# Patient Record
Sex: MALE | Race: WHITE | NOT HISPANIC OR LATINO | Employment: OTHER | URBAN - METROPOLITAN AREA
[De-identification: names, ages, dates, MRNs, and addresses within clinical notes are randomized per-mention and may not be internally consistent; named-entity substitution may affect disease eponyms.]

---

## 2022-06-23 ENCOUNTER — HOSPITAL ENCOUNTER (OUTPATIENT)
Dept: RADIOLOGY | Facility: IMAGING CENTER | Age: 82
Discharge: HOME | End: 2022-06-23
Payer: MEDICARE

## 2022-06-23 DIAGNOSIS — R91.1 PULMONARY NODULE: ICD-10-CM

## 2022-06-23 PROCEDURE — 71250 CT THORAX DX C-: CPT

## 2022-09-09 ENCOUNTER — HOSPITAL ENCOUNTER (OUTPATIENT)
Dept: RADIOLOGY | Age: 82
Discharge: HOME | End: 2022-09-09
Payer: MEDICARE

## 2022-09-09 DIAGNOSIS — R91.8 PULMONARY NODULES: ICD-10-CM

## 2022-09-09 PROCEDURE — 71250 CT THORAX DX C-: CPT

## 2022-09-26 ENCOUNTER — HOSPITAL ENCOUNTER (OUTPATIENT)
Dept: NON INVASIVE DIAGNOSTICS | Facility: CLINIC | Age: 82
Discharge: HOME | End: 2022-09-26
Payer: MEDICARE

## 2022-09-26 DIAGNOSIS — R07.9 CHEST PAIN, UNSPECIFIED TYPE: ICD-10-CM

## 2022-09-26 LAB
BASELINE ST DEPRESSION: 0 MM
MAX HR: 82 BPM
NUC STRESS EJECTION FRACTION: 70 %
RATE PRESSURE PRODUCT: NORMAL
SL CV REST NUCLEAR ISOTOPE DOSE: 15.77 MCI
SL CV STRESS NUCLEAR ISOTOPE DOSE: 48 MCI
SL CV STRESS RECOVERY BP: NORMAL MMHG
SL CV STRESS RECOVERY HR: 69 BPM
SL CV STRESS RECOVERY O2 SAT: 98 %
STRESS ANGINA INDEX: 0
STRESS BASELINE BP: NORMAL MMHG
STRESS BASELINE HR: 72 BPM
STRESS O2 SAT REST: 98 %
STRESS PEAK HR: 82 BPM
STRESS POST O2 SAT PEAK: 99 %
STRESS POST PEAK BP: 142 MMHG
STRESS ST DEPRESSION: 0 MM
STRESS/REST PERFUSION RATIO: 1.04

## 2022-09-26 PROCEDURE — 78452 HT MUSCLE IMAGE SPECT MULT: CPT | Performed by: INTERNAL MEDICINE

## 2022-09-26 PROCEDURE — 93017 CV STRESS TEST TRACING ONLY: CPT

## 2022-09-26 PROCEDURE — 78452 HT MUSCLE IMAGE SPECT MULT: CPT

## 2022-09-26 PROCEDURE — 93018 CV STRESS TEST I&R ONLY: CPT | Performed by: INTERNAL MEDICINE

## 2022-09-26 PROCEDURE — A9502 TC99M TETROFOSMIN: HCPCS

## 2022-09-26 PROCEDURE — 93016 CV STRESS TEST SUPVJ ONLY: CPT | Performed by: INTERNAL MEDICINE

## 2022-09-26 RX ADMIN — REGADENOSON 0.4 MG: 0.08 INJECTION, SOLUTION INTRAVENOUS at 12:27

## 2022-09-27 LAB
CHEST PAIN STATEMENT: NORMAL
MAX DIASTOLIC BP: 84 MMHG
MAX HEART RATE: 82 BPM
MAX PREDICTED HEART RATE: 140 BPM
MAX. SYSTOLIC BP: 142 MMHG
PROTOCOL NAME: NORMAL
REASON FOR TERMINATION: NORMAL
TARGET HR FORMULA: NORMAL
TEST INDICATION: NORMAL
TIME IN EXERCISE PHASE: NORMAL

## 2022-10-27 ENCOUNTER — HOSPITAL ENCOUNTER (INPATIENT)
Facility: HOSPITAL | Age: 82
LOS: 2 days | Discharge: HOME HEALTH | End: 2022-10-30
Attending: EMERGENCY MEDICINE | Admitting: STUDENT IN AN ORGANIZED HEALTH CARE EDUCATION/TRAINING PROGRAM

## 2022-10-27 ENCOUNTER — APPOINTMENT (EMERGENCY)
Dept: RADIOLOGY | Facility: HOSPITAL | Age: 82
End: 2022-10-27

## 2022-10-27 DIAGNOSIS — R55 NEAR SYNCOPE: ICD-10-CM

## 2022-10-27 DIAGNOSIS — N17.9 AKI (ACUTE KIDNEY INJURY) (HCC): ICD-10-CM

## 2022-10-27 DIAGNOSIS — N18.9 ACUTE RENAL FAILURE SUPERIMPOSED ON CHRONIC KIDNEY DISEASE, UNSPECIFIED CKD STAGE, UNSPECIFIED ACUTE RENAL FAILURE TYPE (HCC): ICD-10-CM

## 2022-10-27 DIAGNOSIS — E87.5 HYPERKALEMIA: ICD-10-CM

## 2022-10-27 DIAGNOSIS — N40.0 BENIGN PROSTATIC HYPERPLASIA WITHOUT LOWER URINARY TRACT SYMPTOMS: ICD-10-CM

## 2022-10-27 DIAGNOSIS — R07.89 ATYPICAL CHEST PAIN: Primary | ICD-10-CM

## 2022-10-27 DIAGNOSIS — I48.0 PAROXYSMAL ATRIAL FIBRILLATION (HCC): ICD-10-CM

## 2022-10-27 DIAGNOSIS — N17.9 ACUTE RENAL FAILURE SUPERIMPOSED ON CHRONIC KIDNEY DISEASE, UNSPECIFIED CKD STAGE, UNSPECIFIED ACUTE RENAL FAILURE TYPE (HCC): ICD-10-CM

## 2022-10-27 PROBLEM — E11.9 TYPE 2 DIABETES MELLITUS (HCC): Status: ACTIVE | Noted: 2022-10-27

## 2022-10-27 PROBLEM — I49.5 SICK SINUS SYNDROME (HCC): Status: ACTIVE | Noted: 2022-10-27

## 2022-10-27 PROBLEM — R07.9 CHEST PAIN: Status: ACTIVE | Noted: 2022-10-27

## 2022-10-27 PROBLEM — R19.7 DIARRHEA: Status: ACTIVE | Noted: 2022-10-27

## 2022-10-27 PROBLEM — R13.10 DYSPHAGIA: Status: ACTIVE | Noted: 2022-10-27

## 2022-10-27 PROBLEM — R42 LIGHTHEADEDNESS: Status: ACTIVE | Noted: 2022-10-27

## 2022-10-27 PROBLEM — U07.1 COVID-19: Status: ACTIVE | Noted: 2022-10-27

## 2022-10-27 PROBLEM — E66.9 OBESITY: Status: ACTIVE | Noted: 2022-10-27

## 2022-10-27 LAB
2HR DELTA HS TROPONIN: 1 NG/L
4HR DELTA HS TROPONIN: 0 NG/L
ALBUMIN SERPL BCP-MCNC: 3.2 G/DL (ref 3.5–5)
ALP SERPL-CCNC: 53 U/L (ref 46–116)
ALT SERPL W P-5'-P-CCNC: 36 U/L (ref 12–78)
ANION GAP SERPL CALCULATED.3IONS-SCNC: 6 MMOL/L (ref 4–13)
AST SERPL W P-5'-P-CCNC: 20 U/L (ref 5–45)
ATRIAL RATE: 202 BPM
ATRIAL RATE: 394 BPM
ATRIAL RATE: 500 BPM
BASOPHILS # BLD AUTO: 0.03 THOUSANDS/ÂΜL (ref 0–0.1)
BASOPHILS NFR BLD AUTO: 0 % (ref 0–1)
BILIRUB SERPL-MCNC: 0.54 MG/DL (ref 0.2–1)
BUN SERPL-MCNC: 46 MG/DL (ref 5–25)
CALCIUM ALBUM COR SERPL-MCNC: 10 MG/DL (ref 8.3–10.1)
CALCIUM SERPL-MCNC: 9.4 MG/DL (ref 8.3–10.1)
CARDIAC TROPONIN I PNL SERPL HS: 10 NG/L
CARDIAC TROPONIN I PNL SERPL HS: 10 NG/L
CARDIAC TROPONIN I PNL SERPL HS: 11 NG/L
CHLORIDE SERPL-SCNC: 111 MMOL/L (ref 96–108)
CO2 SERPL-SCNC: 18 MMOL/L (ref 21–32)
CREAT SERPL-MCNC: 2.07 MG/DL (ref 0.6–1.3)
EOSINOPHIL # BLD AUTO: 0.13 THOUSAND/ÂΜL (ref 0–0.61)
EOSINOPHIL NFR BLD AUTO: 2 % (ref 0–6)
ERYTHROCYTE [DISTWIDTH] IN BLOOD BY AUTOMATED COUNT: 13.6 % (ref 11.6–15.1)
FLUAV RNA RESP QL NAA+PROBE: NEGATIVE
FLUBV RNA RESP QL NAA+PROBE: NEGATIVE
GFR SERPL CREATININE-BSD FRML MDRD: 29 ML/MIN/1.73SQ M
GLUCOSE SERPL-MCNC: 157 MG/DL (ref 65–140)
GLUCOSE SERPL-MCNC: 66 MG/DL (ref 65–140)
GLUCOSE SERPL-MCNC: 73 MG/DL (ref 65–140)
HCT VFR BLD AUTO: 37.7 % (ref 36.5–49.3)
HGB BLD-MCNC: 12.2 G/DL (ref 12–17)
IMM GRANULOCYTES # BLD AUTO: 0.03 THOUSAND/UL (ref 0–0.2)
IMM GRANULOCYTES NFR BLD AUTO: 0 % (ref 0–2)
LYMPHOCYTES # BLD AUTO: 1.28 THOUSANDS/ÂΜL (ref 0.6–4.47)
LYMPHOCYTES NFR BLD AUTO: 19 % (ref 14–44)
MCH RBC QN AUTO: 29.4 PG (ref 26.8–34.3)
MCHC RBC AUTO-ENTMCNC: 32.4 G/DL (ref 31.4–37.4)
MCV RBC AUTO: 91 FL (ref 82–98)
MONOCYTES # BLD AUTO: 0.55 THOUSAND/ÂΜL (ref 0.17–1.22)
MONOCYTES NFR BLD AUTO: 8 % (ref 4–12)
NEUTROPHILS # BLD AUTO: 4.84 THOUSANDS/ÂΜL (ref 1.85–7.62)
NEUTS SEG NFR BLD AUTO: 71 % (ref 43–75)
NRBC BLD AUTO-RTO: 0 /100 WBCS
NT-PROBNP SERPL-MCNC: 181 PG/ML
PLATELET # BLD AUTO: 190 THOUSANDS/UL (ref 149–390)
PMV BLD AUTO: 11.2 FL (ref 8.9–12.7)
POTASSIUM SERPL-SCNC: 5.8 MMOL/L (ref 3.5–5.3)
PR INTERVAL: 384 MS
PROT SERPL-MCNC: 6.6 G/DL (ref 6.4–8.4)
QRS AXIS: 0 DEGREES
QRS AXIS: 55 DEGREES
QRS AXIS: 56 DEGREES
QRS AXIS: 71 DEGREES
QRSD INTERVAL: 68 MS
QRSD INTERVAL: 78 MS
QRSD INTERVAL: 88 MS
QRSD INTERVAL: 88 MS
QT INTERVAL: 216 MS
QT INTERVAL: 352 MS
QT INTERVAL: 358 MS
QT INTERVAL: 424 MS
QTC INTERVAL: 246 MS
QTC INTERVAL: 360 MS
QTC INTERVAL: 385 MS
QTC INTERVAL: 409 MS
RBC # BLD AUTO: 4.15 MILLION/UL (ref 3.88–5.62)
RSV RNA RESP QL NAA+PROBE: NEGATIVE
SARS-COV-2 RNA RESP QL NAA+PROBE: POSITIVE
SODIUM SERPL-SCNC: 135 MMOL/L (ref 135–147)
T WAVE AXIS: 270 DEGREES
T WAVE AXIS: 62 DEGREES
T WAVE AXIS: 65 DEGREES
T WAVE AXIS: 75 DEGREES
VENTRICULAR RATE: 56 BPM
VENTRICULAR RATE: 61 BPM
VENTRICULAR RATE: 72 BPM
VENTRICULAR RATE: 78 BPM
WBC # BLD AUTO: 6.86 THOUSAND/UL (ref 4.31–10.16)

## 2022-10-27 RX ORDER — INSULIN LISPRO 100 [IU]/ML
2-12 INJECTION, SOLUTION INTRAVENOUS; SUBCUTANEOUS
Status: DISCONTINUED | OUTPATIENT
Start: 2022-10-28 | End: 2022-10-30 | Stop reason: HOSPADM

## 2022-10-27 RX ORDER — COLCHICINE 0.6 MG/1
0.6 TABLET ORAL 2 TIMES DAILY
Status: DISCONTINUED | OUTPATIENT
Start: 2022-10-27 | End: 2022-10-30 | Stop reason: HOSPADM

## 2022-10-27 RX ORDER — HEPARIN SODIUM 5000 [USP'U]/ML
5000 INJECTION, SOLUTION INTRAVENOUS; SUBCUTANEOUS EVERY 8 HOURS SCHEDULED
Status: DISCONTINUED | OUTPATIENT
Start: 2022-10-27 | End: 2022-10-29

## 2022-10-27 RX ORDER — SODIUM CHLORIDE, SODIUM GLUCONATE, SODIUM ACETATE, POTASSIUM CHLORIDE, MAGNESIUM CHLORIDE, SODIUM PHOSPHATE, DIBASIC, AND POTASSIUM PHOSPHATE .53; .5; .37; .037; .03; .012; .00082 G/100ML; G/100ML; G/100ML; G/100ML; G/100ML; G/100ML; G/100ML
75 INJECTION, SOLUTION INTRAVENOUS CONTINUOUS
Status: DISCONTINUED | OUTPATIENT
Start: 2022-10-27 | End: 2022-10-27

## 2022-10-27 RX ORDER — LEVOTHYROXINE SODIUM 0.1 MG/1
100 TABLET ORAL
Status: DISCONTINUED | OUTPATIENT
Start: 2022-10-28 | End: 2022-10-30 | Stop reason: HOSPADM

## 2022-10-27 RX ORDER — INSULIN GLARGINE 100 [IU]/ML
20 INJECTION, SOLUTION SUBCUTANEOUS
Status: DISCONTINUED | OUTPATIENT
Start: 2022-10-27 | End: 2022-10-27

## 2022-10-27 RX ORDER — METOPROLOL SUCCINATE 50 MG/1
50 TABLET, EXTENDED RELEASE ORAL DAILY
Status: DISCONTINUED | OUTPATIENT
Start: 2022-10-28 | End: 2022-10-30 | Stop reason: HOSPADM

## 2022-10-27 RX ORDER — ALLOPURINOL 100 MG/1
100 TABLET ORAL DAILY
Status: DISCONTINUED | OUTPATIENT
Start: 2022-10-28 | End: 2022-10-30 | Stop reason: HOSPADM

## 2022-10-27 RX ORDER — ALBUTEROL SULFATE 90 UG/1
2 AEROSOL, METERED RESPIRATORY (INHALATION) EVERY 6 HOURS PRN
Status: DISCONTINUED | OUTPATIENT
Start: 2022-10-27 | End: 2022-10-30 | Stop reason: HOSPADM

## 2022-10-27 RX ORDER — ACETAMINOPHEN 500 MG
500 TABLET ORAL EVERY 6 HOURS PRN
COMMUNITY

## 2022-10-27 RX ORDER — EZETIMIBE 10 MG/1
10 TABLET ORAL DAILY
COMMUNITY
Start: 2022-06-02 | End: 2023-06-02

## 2022-10-27 RX ORDER — LORATADINE 10 MG/1
10 TABLET ORAL DAILY
COMMUNITY

## 2022-10-27 RX ORDER — ALBUTEROL SULFATE 90 UG/1
2 AEROSOL, METERED RESPIRATORY (INHALATION) EVERY 6 HOURS PRN
COMMUNITY

## 2022-10-27 RX ORDER — SODIUM CHLORIDE, SODIUM GLUCONATE, SODIUM ACETATE, POTASSIUM CHLORIDE, MAGNESIUM CHLORIDE, SODIUM PHOSPHATE, DIBASIC, AND POTASSIUM PHOSPHATE .53; .5; .37; .037; .03; .012; .00082 G/100ML; G/100ML; G/100ML; G/100ML; G/100ML; G/100ML; G/100ML
75 INJECTION, SOLUTION INTRAVENOUS CONTINUOUS
Status: DISCONTINUED | OUTPATIENT
Start: 2022-10-27 | End: 2022-10-28

## 2022-10-27 RX ORDER — ALLOPURINOL 100 MG/1
100 TABLET ORAL DAILY
COMMUNITY

## 2022-10-27 RX ORDER — DEXTROSE MONOHYDRATE 25 G/50ML
25 INJECTION, SOLUTION INTRAVENOUS ONCE
Status: COMPLETED | OUTPATIENT
Start: 2022-10-27 | End: 2022-10-27

## 2022-10-27 RX ORDER — COLCHICINE 0.6 MG/1
0.6 TABLET ORAL 2 TIMES DAILY
COMMUNITY

## 2022-10-27 RX ORDER — FUROSEMIDE 20 MG/1
20 TABLET ORAL DAILY
COMMUNITY

## 2022-10-27 RX ORDER — LEVOTHYROXINE SODIUM 112 UG/1
TABLET ORAL
COMMUNITY
Start: 2022-09-19

## 2022-10-27 RX ORDER — LISINOPRIL 5 MG/1
TABLET ORAL
COMMUNITY
Start: 2022-09-20

## 2022-10-27 RX ORDER — EZETIMIBE 10 MG/1
10 TABLET ORAL DAILY
Status: DISCONTINUED | OUTPATIENT
Start: 2022-10-28 | End: 2022-10-30 | Stop reason: HOSPADM

## 2022-10-27 RX ORDER — TAMSULOSIN HYDROCHLORIDE 0.4 MG/1
0.4 CAPSULE ORAL
Status: DISCONTINUED | OUTPATIENT
Start: 2022-10-27 | End: 2022-10-30 | Stop reason: HOSPADM

## 2022-10-27 RX ORDER — LORATADINE 10 MG/1
10 TABLET ORAL DAILY
Status: DISCONTINUED | OUTPATIENT
Start: 2022-10-28 | End: 2022-10-30 | Stop reason: HOSPADM

## 2022-10-27 RX ADMIN — HEPARIN SODIUM 5000 UNITS: 5000 INJECTION INTRAVENOUS; SUBCUTANEOUS at 21:42

## 2022-10-27 RX ADMIN — COLCHICINE 0.6 MG: 0.6 TABLET ORAL at 19:45

## 2022-10-27 RX ADMIN — IOHEXOL 100 ML: 350 INJECTION, SOLUTION INTRAVENOUS at 15:27

## 2022-10-27 RX ADMIN — DEXTROSE MONOHYDRATE 25 ML: 25 INJECTION, SOLUTION INTRAVENOUS at 19:44

## 2022-10-27 RX ADMIN — TAMSULOSIN HYDROCHLORIDE 0.4 MG: 0.4 CAPSULE ORAL at 19:45

## 2022-10-27 RX ADMIN — SODIUM CHLORIDE, SODIUM GLUCONATE, SODIUM ACETATE, POTASSIUM CHLORIDE, MAGNESIUM CHLORIDE, SODIUM PHOSPHATE, DIBASIC, AND POTASSIUM PHOSPHATE 75 ML/HR: .53; .5; .37; .037; .03; .012; .00082 INJECTION, SOLUTION INTRAVENOUS at 19:44

## 2022-10-27 RX ADMIN — INSULIN HUMAN 10 UNITS: 100 INJECTION, SOLUTION PARENTERAL at 19:50

## 2022-10-27 NOTE — ASSESSMENT & PLAN NOTE
· On metoprolol succinate 50 mg daily  · On Pradaxa which has been on hold since yesterday for tooth extraction that's scheduled for tomorrow  · Continue to hold Pradaxa, patient discussed it with his cardiologist  · Dental appointment is tomorrow at 11:30 a m    · If patient remains inpatient tomorrow, restart Pradaxa

## 2022-10-27 NOTE — ASSESSMENT & PLAN NOTE
· Patient developed midsternal chest pain after taking a shower today that radiates to his back, neck and left arm  Associated left arm numbness  Improved with nitroglycerin  · ACS versus musculoskeletal versus aortic dissection  · He is following [facility], he was evaluated in September for midsternal chest pain  09/26/2022 he underwent pharmacological stress test which was negative  · Troponin x2 negative, EKG V paced, no acute ischemic changes    MARY score 3  · CTA chest abdomen pelvis showed no evidence of aortic aneurysm, dissection or intramural hematoma  · Pain is not reproducible  · Recent COVID infection, no pericardial effusion seen on CTA  · Continue to cycle troponin  · Telemetry  · Consider cardiology consult if pain reoccurs

## 2022-10-27 NOTE — ASSESSMENT & PLAN NOTE
· Patient tested positive for COVID about 3 weeks ago, at that time he had sore throat, denies having any respiratory issues   He received treatment for COVID as outpatient  · He tested positive for COVID today  · No respiratory issues  · CT chest without pneumonia  · Supportive care

## 2022-10-27 NOTE — ASSESSMENT & PLAN NOTE
· History of paroxysmal atrial fibrillation and sick sinus syndrome status post pacemaker  · Interrogate pacemaker

## 2022-10-27 NOTE — ASSESSMENT & PLAN NOTE
· Per wife and patient diarrhea almost since COVID, 3 weeks ago  · Usually it's once a day but it can happen up to 3 times a day  · Suspect COVID related  · Check C diff and enteric panel for completeness

## 2022-10-27 NOTE — H&P
1995 [facility] 1940, [de-identified] y o  male MRN: [id number]  Unit/Bed#: ED 14 Encounter: [id number]  Primary Care Provider: Kris Cary MD   Date and time admitted to hospital: 10/27/2022 11:45 AM    Due to low blood sugar discontinue Lantus HS  * Chest pain  Assessment & Plan  · Patient developed midsternal chest pain after taking a shower today that radiates to his back, neck and left arm  Associated left arm numbness  Improved with nitroglycerin  · ACS versus musculoskeletal versus aortic dissection  · He is following [facility], he was evaluated in September for midsternal chest pain  09/26/2022 he underwent pharmacological stress test which was negative  · Troponin x2 negative, EKG V paced, no acute ischemic changes    MARY score 3  · CTA chest abdomen pelvis showed no evidence of aortic aneurysm, dissection or intramural hematoma  · Pain is not reproducible  · Recent COVID infection, no pericardial effusion seen on CTA  · Continue to cycle troponin  · Telemetry  · Consider cardiology consult if pain reoccurs    Lightheadedness  Assessment & Plan  · Lightheadedness for over a week, suspect orthostatic since it happens after he sits up and stands up  · Reports poor p o  intake, which could be related to recent COVID  · Reports watery diarrhea usually once a day, but it can happen up to 3 times a day for the past 3 weeks which could also be related to COVID versus gastroenteritis, no episode of diarrhea today  · Check C diff and enteric panel in case of recurrence of diarrhea  · Interrogate pacemaker for completeness  · Gentle hydration with Plasma-Lyte 75 cc/hour  · Orthostatic vitals    Diarrhea  Assessment & Plan  · Per wife and patient diarrhea almost since COVID, 3 weeks ago  · Usually it's once a day but it can happen up to 3 times a day  · Suspect COVID related  · Check C diff and enteric panel for completeness    Acute renal failure superimposed on chronic kidney disease [facility])  Assessment & Plan  Lab Results   Component Value Date    EGFR 29 10/27/2022    CREATININE 2 07 (H) 10/27/2022   · Suspect secondary to poor p o  intake, diarrhea  · Baseline creatinine from 1 1-1 4  · Creatinine on admission 2 07  · Monitor creatinine in the next 24-48 hours, patient received contrast for CTA  · Bladder scan  · Patient is on Lasix 20 mg daily and lisinopril 10 mg daily, hold  · Gentle hydration with Plasma-Lyte at 75 cc/hour   · Repeat BMP tomorrow    Dysphagia  Assessment & Plan  · Patient has a feeling food gets stuck in his throat for the past week  · Contributes it to the pills that he got for COVID  · Speech eval, possible barium swallow eval if necessary    COVID-19  Assessment & Plan  · Patient tested positive for COVID about 3 weeks ago, at that time he had sore throat, denies having any respiratory issues  He received treatment for COVID as outpatient  · He tested positive for COVID today  · No respiratory issues  · CT chest without pneumonia  · Supportive care    [facility]  Assessment & Plan  Lab Results   Component Value Date    HGBA1C 9 5 (H) 05/11/2022       No results for input(s): POCGLU in the last 72 hours      Blood Sugar Average: Last 72 hrs:  ·  patient is on Tresiba 50 units in the morning and 80 units in the evening  · He did not take this morning dose of Tresiba because his blood sugar was in the 50s  · Will start Lantus 20 units HS and insulin sliding scale  · Diabetic diet    Hyperkalemia  Assessment & Plan  · Suspect in the setting of DANY on CKD stage 3  · Insulin/dextrose    Obesity  Assessment & Plan  · Lifestyle modification    BPH (benign prostatic hyperplasia)  Assessment & Plan  · Continue tamsulosin    Paroxysmal atrial fibrillation (HCC)  Assessment & Plan  · On metoprolol succinate 50 mg daily  · On Pradaxa which has been on hold since yesterday for tooth extraction that's scheduled for tomorrow  · Continue to hold Pradaxa, patient discussed it with his cardiologist  · Dental appointment is tomorrow at 11:30 a m  · If patient remains inpatient tomorrow, restart Pradaxa    Sick sinus syndrome (HCC)  Assessment & Plan  · History of paroxysmal atrial fibrillation and sick sinus syndrome status post pacemaker  · Interrogate pacemaker    VTE Pharmacologic Prophylaxis: VTE Score: 5 High Risk (Score >/= 5) - Pharmacological DVT Prophylaxis Ordered: heparin  Sequential Compression Devices Ordered  Code Status: Level 1 - Full Code   Discussion with family: Updated  (wife) via phone  Anticipated Length of Stay: Patient will be admitted on an observation basis with an anticipated length of stay of less than 2 midnights secondary to Chest pain, lightheadedness  Total Time for Visit, including Counseling / Coordination of Care: 60 minutes Greater than 50% of this total time spent on direct patient counseling and coordination of care  Chief Complaint:  Chest pain, lightheadedness    History of Present Illness:  Shell Bauer is a [de-identified] y o  male with a PMH of hypertension, hyperlipidemia, BPH, hypothyroidism, paroxysmal atrial fibrillation, sick sinus syndrome status post ppm, obstructive sleep apnea, CKD stage 3 who presents with chest pain and lightheadedness  Patient developed midsternal chest pain after taking a shower this morning which was radiating to his back, left arm and neck  Associated with left arm numbness  He called 911, he got a dose of nitro which alleviated the pain  After nitro his blood pressure dropped  He also reports lightheadedness for over a week  Diagnosed with COVID around 3 weeks ago  Poor p o  intake for over a week, complains of bad taste in his mouth which he got after he took pills for COVID  His blood sugar was in the 50s this morning  Report almost 3 weeks of watery diarrhea once a day but he can happen up to 3 times a day    Reports lightheadedness which most often happens after he has then stop  Report food getting stuck in his throat, contributed to the pills he was taking for COVID  Review of Systems:  Review of Systems   Constitutional: Positive for activity change and appetite change  Negative for chills and fever  HENT: Negative  Eyes: Negative  Respiratory: Negative for cough, chest tightness, shortness of breath and wheezing  Cardiovascular: Positive for chest pain  Negative for palpitations and leg swelling  Gastrointestinal: Positive for diarrhea  Negative for abdominal pain, nausea and vomiting  Genitourinary: Negative for dysuria  Musculoskeletal: Negative  Skin: Negative  Neurological: Positive for light-headedness  Hematological: Negative  Psychiatric/Behavioral: Negative  Past Medical and Surgical History:   Past Medical History:   Diagnosis Date   • Diabetes [facility])    • Essential tremor    • Hyperlipidemia    • Hypertension    • Obstructive sleep apnea    • Paroxysmal atrial fibrillation [facility])    • Sick sinus syndrome [facility])        Past Surgical History:   Procedure Laterality Date   • CT NEEDLE BIOPSY LUNG  2/4/2021       Meds/Allergies:  Prior to Admission medications    Medication Sig Start Date End Date Taking?  Authorizing Provider   ezetimibe (ZETIA) 10 mg tablet Take 10 mg by mouth daily 6/2/22 6/2/23 Yes Historical Provider, MD   acetaminophen (TYLENOL) 500 mg tablet Take 500 mg by mouth every 6 (six) hours as needed    Historical Provider, MD   albuterol (PROVENTIL HFA,VENTOLIN HFA) 90 mcg/act inhaler Inhale 2 puffs every 6 (six) hours as needed    Historical Provider, MD   allopurinol (ZYLOPRIM) 100 mg tablet Take 100 mg by mouth daily    Historical Provider, MD   Ascorbic Acid (VITAMIN C PO) Take 2 tablets by mouth every morning    Historical Provider, MD   CHOLECALCIFEROL PO Take 2 tablets by mouth every morning    Historical Provider, MD   colchicine (COLCRYS) 0 6 mg tablet Take 0 6 mg by mouth 2 (two) times a day    Historical Provider, MD   cyanocobalamin (VITAMIN B-12) 1000 MCG tablet Take 1,000 mcg by mouth    Historical Provider, MD   FERROUS FUMARATE PO Take 1 tablet by mouth every morning    Historical Provider, MD   furosemide (LASIX) 20 mg tablet Take 20 mg by mouth daily    Historical Provider, MD   levothyroxine 112 mcg tablet  9/19/22   Historical Provider, MD   lisinopril (ZESTRIL) 5 mg tablet  9/20/22   Historical Provider, MD   loratadine (CLARITIN) 10 mg tablet Take 10 mg by mouth daily    Historical Provider, MD     I have reviewed home medications with patient family member  Allergies: Allergies   Allergen Reactions   • Asa [Aspirin] GI Bleeding   • Januvia [Sitagliptin] Hives   • Canagliflozin Rash       Social History:  Marital Status: Unknown     Substance Use History:   Social History     Substance and Sexual Activity   Alcohol Use None     Social History     Tobacco Use   Smoking Status Former Smoker   Smokeless Tobacco Never Used     Social History     Substance and Sexual Activity   Drug Use Not on file       Family History:  History reviewed  No pertinent family history  Physical Exam:     Vitals:   Blood Pressure: 161/71 (10/27/22 1800)  Pulse: 69 (10/27/22 1800)  Temperature: 97 5 °F (36 4 °C) (10/27/22 1800)  Temp Source: Oral (10/27/22 1800)  Respirations: 20 (10/27/22 1800)  Weight - Scale: (!) 136 kg (300 lb 6 4 oz) (10/27/22 1851)  SpO2: 98 % (10/27/22 1800)    Physical Exam  Constitutional:       General: He is not in acute distress  Appearance: He is obese  HENT:      Head: Atraumatic  Cardiovascular:      Rate and Rhythm: Normal rate and regular rhythm  Heart sounds: No murmur heard  No friction rub  No gallop  Pulmonary:      Effort: Pulmonary effort is normal  No respiratory distress  Breath sounds: Normal breath sounds  No wheezing  Abdominal:      General: Bowel sounds are normal  There is no distension  Palpations: Abdomen is soft  Tenderness: There is no abdominal tenderness  Musculoskeletal:         General: No swelling  Cervical back: Neck supple  Skin:     General: Skin is warm and dry  Neurological:      General: No focal deficit present  Mental Status: He is alert and oriented to person, place, and time  Cranial Nerves: No cranial nerve deficit  Sensory: No sensory deficit  Motor: No weakness  Psychiatric:         Mood and Affect: Mood normal           Additional Data:     Lab Results:  Results from last 7 days   Lab Units 10/27/22  1207   WBC Thousand/uL 6 86   HEMOGLOBIN g/dL 12 2   HEMATOCRIT % 37 7   PLATELETS Thousands/uL 190   NEUTROS PCT % 71   LYMPHS PCT % 19   MONOS PCT % 8   EOS PCT % 2     Results from last 7 days   Lab Units 10/27/22  1207   SODIUM mmol/L 135   POTASSIUM mmol/L 5 8*   CHLORIDE mmol/L 111*   CO2 mmol/L 18*   BUN mg/dL 46*   CREATININE mg/dL 2 07*   ANION GAP mmol/L 6   CALCIUM mg/dL 9 4   ALBUMIN g/dL 3 2*   TOTAL BILIRUBIN mg/dL 0 54   ALK PHOS U/L 53   ALT U/L 36   AST U/L 20   GLUCOSE RANDOM mg/dL 157*                       Imaging: Reviewed radiology reports from this admission including: chest CT scan and abdominal/pelvic CT  CTA dissection protocol chest abdomen pelvis w wo contrast   Final Result by Rajan Arnold MD (10/27 1702)         1  No evidence for aortic aneurysm, dissection, or intramural hematoma  2   Stable pulmonary nodules as noted  3   Postoperative change lateral abdominal wall with questionable adjacent small midline ventral Mobley hernia involving small bowel without evidence for strangulation or obstruction  4   Additional findings as noted  Workstation performed: [id number]         XR chest 2 views   ED Interpretation by Yulisa Gomez PA-C (10/27 1326)   No focal consolidation      Final Result by Eveline Mclaughlin DO (10/27 1410)      No acute cardiopulmonary disease    Known left mediastinal mass not well seen on this exam                   Workstation performed: [id number]             EKG and Other Studies Reviewed on Admission:   · EKG: V paced rhythm  ** Please Note: This note has been constructed using a voice recognition system   **

## 2022-10-27 NOTE — ASSESSMENT & PLAN NOTE
Lab Results   Component Value Date    EGFR 29 10/27/2022    CREATININE 2 07 (H) 10/27/2022   · Suspect secondary to poor p o  intake, diarrhea  · Baseline creatinine from 1 1-1 4  · Creatinine on admission 2 07  · Monitor creatinine in the next 24-48 hours, patient received contrast for CTA  · Bladder scan  · Patient is on Lasix 20 mg daily and lisinopril 10 mg daily, hold  · Gentle hydration with Plasma-Lyte at 75 cc/hour   · Repeat BMP tomorrow

## 2022-10-27 NOTE — ASSESSMENT & PLAN NOTE
Lab Results   Component Value Date    HGBA1C 9 5 (H) 05/11/2022       No results for input(s): POCGLU in the last 72 hours      Blood Sugar Average: Last 72 hrs:  ·  patient is on Tresiba 50 units in the morning and 80 units in the evening  · He did not take this morning dose of Tresiba because his blood sugar was in the 50s  · Will start Lantus 20 units HS and insulin sliding scale  · Diabetic diet

## 2022-10-27 NOTE — ASSESSMENT & PLAN NOTE
· Patient has a feeling food gets stuck in his throat for the past week  · Contributes it to the pills that he got for COVID  · Speech eval, possible barium swallow eval if necessary

## 2022-10-27 NOTE — ED PROVIDER NOTES
History  Chief Complaint   Patient presents with   • Chest Pain     Pt c/o of pain in chest and upper back; 1 nitro given by EMS, no aspirin given; chest pain resolved  Pt c/o dizziness and blurred vision with chest pain this AM       73-year-old male presents to the emergency department with complaints of chest pain  States that he started with some pain across the upper part of his chest into his neck and back earlier this morning  States that he was also slightly dizzy  Reports some shortness of breath without diaphoresis, nausea or vomiting  Patient states that he called EMS was given 1 nitro with some improvement but not total alleviation of the pain  States he has had similar symptoms almost daily in the morning for quite some time but that this seemed more intense than usual   [place] reports that his status Pradaxa over the past 1-2 days for a dental extraction tomorrow  To have 3 teeth pulled  Recently had a nuclear stress test and nonischemic was also fairly heavily hospital in preparation for extraction        History provided by:  Patient   used: No    Chest Pain  Pain location:  Substernal area  Pain quality: pressure and tightness    Pain radiates to:  Upper back, neck and L arm  Pain severity:  Moderate  Onset quality:  Gradual  Timing:  Constant  Progression:  Improving  Context: not breathing, no drug use, not eating, no intercourse, not lifting, no movement, not raising an arm, not at rest, no stress and no trauma    Context comment:  Activity  Relieved by:  Nitroglycerin  Worsened by:  Nothing tried  Associated symptoms: shortness of breath    Associated symptoms: no abdominal pain, no AICD problem, no altered mental status, no anorexia, no anxiety, no back pain, no claudication, no cough, no diaphoresis, no dizziness, no dysphagia, no fatigue, no fever, no headache, no heartburn, no lower extremity edema, no nausea, no near-syncope, no numbness, no orthopnea, no palpitations, no syncope, not vomiting and no weakness        None       History reviewed  No pertinent past medical history  Past Surgical History:   Procedure Laterality Date   • CT NEEDLE BIOPSY LUNG  2/4/2021       History reviewed  No pertinent family history  I have reviewed and agree with the history as documented  E-Cigarette/Vaping     E-Cigarette/Vaping Substances     Social History     Tobacco Use   • Smoking status: Former Smoker   • Smokeless tobacco: Never Used       Review of Systems   Constitutional: Negative for activity change, appetite change, chills, diaphoresis, fatigue and fever  HENT: Negative for congestion, dental problem, drooling, ear discharge, ear pain, mouth sores, nosebleeds, rhinorrhea, sore throat and trouble swallowing  Eyes: Negative for pain, discharge and itching  Respiratory: Positive for shortness of breath  Negative for cough, chest tightness and wheezing  Cardiovascular: Positive for chest pain  Negative for palpitations, orthopnea, claudication, syncope and near-syncope  Gastrointestinal: Negative for abdominal pain, anorexia, blood in stool, constipation, diarrhea, heartburn, nausea and vomiting  Endocrine: Negative for cold intolerance and heat intolerance  Genitourinary: Negative for difficulty urinating, dysuria, flank pain, frequency and urgency  Musculoskeletal: Positive for neck pain  Negative for back pain  Skin: Negative for rash and wound  Allergic/Immunologic: Negative for food allergies and immunocompromised state  Neurological: Negative for dizziness, seizures, syncope, weakness, numbness and headaches  Psychiatric/Behavioral: Negative for agitation, behavioral problems and confusion  Physical Exam  Physical Exam  Vitals and nursing note reviewed  Constitutional:       General: He is not in acute distress  Appearance: He is not diaphoretic  HENT:      Head: Normocephalic and atraumatic        Right Ear: External ear normal       Left Ear: External ear normal       Mouth/Throat:      Pharynx: No oropharyngeal exudate  Eyes:      Conjunctiva/sclera: Conjunctivae normal    Neck:      Vascular: No JVD  Trachea: No tracheal deviation  Cardiovascular:      Rate and Rhythm: Normal rate and regular rhythm  Heart sounds: Normal heart sounds  No murmur heard  No friction rub  No gallop  Pulmonary:      Effort: Pulmonary effort is normal  No respiratory distress  Breath sounds: Normal breath sounds  No wheezing or rales  Chest:      Chest wall: No tenderness  Abdominal:      General: Bowel sounds are normal  There is no distension  Palpations: Abdomen is soft  Tenderness: There is no abdominal tenderness  There is no guarding  Musculoskeletal:         General: No tenderness or deformity  Normal range of motion  Right lower le+ Edema present  Left lower le+ Edema present  Lymphadenopathy:      Cervical: No cervical adenopathy  Skin:     General: Skin is warm and dry  Findings: No erythema or rash  Neurological:      Mental Status: He is alert and oriented to person, place, and time     Psychiatric:         Mood and Affect: Mood normal          Behavior: Behavior normal          Vital Signs  ED Triage Vitals [10/27/22 1150]   Temperature Pulse Respirations Blood Pressure SpO2   97 6 °F (36 4 °C) 79 18 (!) 169/102 99 %      Temp Source Heart Rate Source Patient Position - Orthostatic VS BP Location FiO2 (%)   Oral Monitor Lying Right arm --      Pain Score       --           Vitals:    10/27/22 1150 10/27/22 1500   BP: (!) 169/102 103/52   Pulse: 79 62   Patient Position - Orthostatic VS: Lying Lying         Visual Acuity      ED Medications  Medications   iohexol (OMNIPAQUE) 350 MG/ML injection (SINGLE-DOSE) 100 mL (100 mL Intravenous Given 10/27/22 1527)       Diagnostic Studies  Results Reviewed     Procedure Component Value Units Date/Time    HS Troponin I 2hr [[id number]]  (Normal) Collected: 10/27/22 1414    Lab Status: Final result Specimen: Blood from Arm, Left Updated: 10/27/22 1458     hs TnI 2hr 11 ng/L      Delta 2hr hsTnI 1 ng/L     HS Troponin I 4hr [[id number]]     Lab Status: No result Specimen: Blood     Comprehensive metabolic panel [[id number]]  (Abnormal) Collected: 10/27/22 1207    Lab Status: Final result Specimen: Blood from Arm, Left Updated: 10/27/22 1255     Sodium 135 mmol/L      Potassium 5 8 mmol/L      Chloride 111 mmol/L      CO2 18 mmol/L      ANION GAP 6 mmol/L      BUN 46 mg/dL      Creatinine 2 07 mg/dL      Glucose 157 mg/dL      Calcium 9 4 mg/dL      Corrected Calcium 10 0 mg/dL      AST 20 U/L      ALT 36 U/L      Alkaline Phosphatase 53 U/L      Total Protein 6 6 g/dL      Albumin 3 2 g/dL      Total Bilirubin 0 54 mg/dL      eGFR 29 ml/min/1 73sq m     Narrative:      [facility] guidelines for Chronic Kidney Disease (CKD):   •  Stage 1 with normal or high GFR (GFR > 90 mL/min/1 73 square meters)  •  Stage 2 Mild CKD (GFR = 60-89 mL/min/1 73 square meters)  •  Stage 3A Moderate CKD (GFR = 45-59 mL/min/1 73 square meters)  •  Stage 3B Moderate CKD (GFR = 30-44 mL/min/1 73 square meters)  •  Stage 4 Severe CKD (GFR = 15-29 mL/min/1 73 square meters)  •  Stage 5 End Stage CKD (GFR <15 mL/min/1 73 square meters)  Note: GFR calculation is accurate only with a steady state creatinine    HS Troponin 0hr (reflex protocol) [[id number]]  (Normal) Collected: 10/27/22 1207    Lab Status: Final result Specimen: Blood from Arm, Left Updated: 10/27/22 1254     hs TnI 0hr 10 ng/L     NT-BNP PRO [[id number]]  (Normal) Collected: 10/27/22 1207    Lab Status: Final result Specimen: Blood from Arm, Left Updated: 10/27/22 1253     NT-proBNP 181 pg/mL     CBC and differential [[id number]] Collected: 10/27/22 1207    Lab Status: Final result Specimen: Blood from Arm, Left Updated: 10/27/22 1235     WBC 6 86 Thousand/uL      RBC 4 15 Million/uL      Hemoglobin 12 2 g/dL      Hematocrit 37 7 %      MCV 91 fL      MCH 29 4 pg      MCHC 32 4 g/dL      RDW 13 6 %      MPV 11 2 fL      Platelets 688 Thousands/uL      nRBC 0 /100 WBCs      Neutrophils Relative 71 %      Immat GRANS % 0 %      Lymphocytes Relative 19 %      Monocytes Relative 8 %      Eosinophils Relative 2 %      Basophils Relative 0 %      Neutrophils Absolute 4 84 Thousands/µL      Immature Grans Absolute 0 03 Thousand/uL      Lymphocytes Absolute 1 28 Thousands/µL      Monocytes Absolute 0 55 Thousand/µL      Eosinophils Absolute 0 13 Thousand/µL      Basophils Absolute 0 03 Thousands/µL                  CTA dissection protocol chest abdomen pelvis w wo contrast   Final Result by Hadley Hernandez MD (10/27 1702)         1  No evidence for aortic aneurysm, dissection, or intramural hematoma  2   Stable pulmonary nodules as noted  3   Postoperative change lateral abdominal wall with questionable adjacent small midline ventral Mobley hernia involving small bowel without evidence for strangulation or obstruction  4   Additional findings as noted  Workstation performed: [id number]         XR chest 2 views   ED Interpretation by Jhonatan Cullen PA-C (10/27 1326)   No focal consolidation      Final Result by Gayla Olmedo DO (10/27 1410)      No acute cardiopulmonary disease    Known left mediastinal mass not well seen on this exam                   Workstation performed: [id number]                    Procedures  ECG 12 Lead Documentation Only    Date/Time: 10/27/2022 11:55 AM  Performed by: Jhonatan Cullen PA-C  Authorized by: Jhonatan Cullen PA-C     Indications / Diagnosis:  Pain  ECG reviewed by me, the ED Provider: yes    Patient location:  ED  Previous ECG:     Previous ECG:  Unavailable  Interpretation:     Interpretation: abnormal    Rate:     ECG rate:  72    ECG rate assessment: normal    Rhythm:     Rhythm: junctional    Ectopy:     Ectopy: none    ST segments:     ST segments:  Normal  T waves:     T waves: normal    ECG 12 Lead Documentation Only    Date/Time: 10/27/2022 12:22 PM  Performed by: Erin Ag PA-C  Authorized by: Erin Ag PA-C     Indications / Diagnosis:  Pain  ECG reviewed by me, the ED Provider: yes    Patient location:  ED  Previous ECG:     Previous ECG:  Compared to current    Similarity:  Changes noted  Interpretation:     Interpretation: abnormal    Rate:     ECG rate:  60    ECG rate assessment: normal    Rhythm:     Rhythm: atrial fibrillation    Ectopy:     Ectopy: PVCs      PVCs:  Infrequent  QRS:     QRS axis:  Normal    QRS intervals:  Normal  Conduction:     Conduction: normal    ST segments:     ST segments:  Abnormal    Elevation:  II  ECG 12 Lead Documentation Only    Date/Time: 10/27/2022 3:34 PM  Performed by: Erin Ag PA-C  Authorized by: Erin Ag PA-C     Indications / Diagnosis:  Low BP  ECG reviewed by me, the ED Provider: yes    Patient location:  ED  Previous ECG:     Previous ECG:  Compared to current  Interpretation:     Interpretation: abnormal    Rate:     ECG rate:  78    ECG rate assessment: normal    Rhythm:     Rhythm: paced    Pacing:     Capture:  Intermittent  Comments:      Computer read as acute MI, reviewed by attending- not acute MI             ED Course  ED Course as of 10/27/22 1713   Thu Oct 27, 2022   1453 Patient with significant drop in blood pressure at time of re-evaluation 92/46 on right forearm  He denies any recurrence of chest discomfort at this time  Spoke with radiology  Will proceed with CTA despite low GFR 29  Repeat EKG ordered  [phone] Call placed to reading room regarding delay in CT read  Notes that the study was not verified  Will call to the CT technician recent prior for study verification prior to reading               HEART Risk Score    Flowsheet Row Most Recent Value   Heart Score Risk Calculator    History 1 Filed at: 10/27/2022 1612   ECG 1 Filed at: 10/27/2022 1612   Age 2 Filed at: 10/27/2022 1612   Risk Factors 2 Filed at: 10/27/2022 1612   Troponin 0 Filed at: 10/27/2022 1612   HEART Score 6 Filed at: 10/27/2022 1612        Identification of Seniors at [facility] Most Recent Value   (ISAR) Identification of Seniors at Risk    Before the illness or injury that brought you to the Emergency, did you need someone to help you on a regular basis? 0 Filed at: 10/27/2022 1149   In the last 24 hours, have you needed more help than usual? 0 Filed at: 10/27/2022 1149   Have you been hospitalized for one or more nights during the past 6 months? 0 Filed at: 10/27/2022 1149   In general, do you see well? 0 Filed at: 10/27/2022 1149   In general, do you have serious problems with your memory? 0 Filed at: 10/27/2022 1149   Do you take more than three different medications every day?  1 Filed at: 10/27/2022 1149   ISAR Score 1 Filed at: 10/27/2022 1149                                    MDM  Number of Diagnoses or Management Options  DANY (acute kidney injury) ([facility] )  Atypical chest pain  Diagnosis management comments: Differential diagnosis includes but not limited to:  ACS, dissection          Amount and/or Complexity of Data Reviewed  Clinical lab tests: ordered and reviewed  Tests in the radiology section of CPT®: ordered and reviewed  Discuss the patient with other providers: yes  Independent visualization of images, tracings, or specimens: yes        Disposition  Final diagnoses:   Atypical chest pain   DANY (acute kidney injury) ([facility] )     Time reflects when diagnosis was documented in both MDM as applicable and the Disposition within this note     Time User Action Codes Description Comment    10/27/2022  5:12 PM Sergei Chang 26 [R07 [id number]] Atypical chest pain     10/27/2022  5:12 PM Sergei Chang 26 [N17 9] DANY (acute kidney injury) [facility])       ED Disposition     ED Disposition   Admit    Condition   Stable    Date/Time   u Oct 27, 2022  5:12 PM    Comment   Case was discussed with VINAY and the patient's admission status was agreed to be Admission Status: observation status to the service of Dr Cole Feeling   Follow-up Information    None         Patient's Medications    No medications on file       No discharge procedures on file      PDMP Review     None          ED Provider  Electronically Signed by           Joseluis Block PA-C  10/27/22 1401

## 2022-10-27 NOTE — ASSESSMENT & PLAN NOTE
· Lightheadedness for over a week, suspect orthostatic since it happens after he sits up and stands up  · Reports poor p o  intake, which could be related to recent COVID  · Reports watery diarrhea usually once a day, but it can happen up to 3 times a day for the past 3 weeks which could also be related to COVID versus gastroenteritis, no episode of diarrhea today  · Check C diff and enteric panel in case of recurrence of diarrhea  · Interrogate pacemaker for completeness  · Gentle hydration with Plasma-Lyte 75 cc/hour  · Orthostatic vitals

## 2022-10-28 LAB
ANION GAP SERPL CALCULATED.3IONS-SCNC: 7 MMOL/L (ref 4–13)
BASOPHILS # BLD AUTO: 0.05 THOUSANDS/ÂΜL (ref 0–0.1)
BASOPHILS NFR BLD AUTO: 1 % (ref 0–1)
BUN SERPL-MCNC: 45 MG/DL (ref 5–25)
CALCIUM SERPL-MCNC: 9.1 MG/DL (ref 8.3–10.1)
CHLORIDE SERPL-SCNC: 109 MMOL/L (ref 96–108)
CO2 SERPL-SCNC: 21 MMOL/L (ref 21–32)
CREAT SERPL-MCNC: 1.77 MG/DL (ref 0.6–1.3)
EOSINOPHIL # BLD AUTO: 0.14 THOUSAND/ÂΜL (ref 0–0.61)
EOSINOPHIL NFR BLD AUTO: 2 % (ref 0–6)
ERYTHROCYTE [DISTWIDTH] IN BLOOD BY AUTOMATED COUNT: 13.6 % (ref 11.6–15.1)
GFR SERPL CREATININE-BSD FRML MDRD: 35 ML/MIN/1.73SQ M
GLUCOSE SERPL-MCNC: 108 MG/DL (ref 65–140)
GLUCOSE SERPL-MCNC: 114 MG/DL (ref 65–140)
GLUCOSE SERPL-MCNC: 124 MG/DL (ref 65–140)
GLUCOSE SERPL-MCNC: 138 MG/DL (ref 65–140)
GLUCOSE SERPL-MCNC: 164 MG/DL (ref 65–140)
GLUCOSE SERPL-MCNC: 81 MG/DL (ref 65–140)
HCT VFR BLD AUTO: 38.3 % (ref 36.5–49.3)
HGB BLD-MCNC: 11.9 G/DL (ref 12–17)
IMM GRANULOCYTES # BLD AUTO: 0.03 THOUSAND/UL (ref 0–0.2)
IMM GRANULOCYTES NFR BLD AUTO: 0 % (ref 0–2)
LYMPHOCYTES # BLD AUTO: 1.65 THOUSANDS/ÂΜL (ref 0.6–4.47)
LYMPHOCYTES NFR BLD AUTO: 22 % (ref 14–44)
MAGNESIUM SERPL-MCNC: 1.3 MG/DL (ref 1.6–2.6)
MCH RBC QN AUTO: 28.5 PG (ref 26.8–34.3)
MCHC RBC AUTO-ENTMCNC: 31.1 G/DL (ref 31.4–37.4)
MCV RBC AUTO: 92 FL (ref 82–98)
MONOCYTES # BLD AUTO: 0.67 THOUSAND/ÂΜL (ref 0.17–1.22)
MONOCYTES NFR BLD AUTO: 9 % (ref 4–12)
NEUTROPHILS # BLD AUTO: 5.03 THOUSANDS/ÂΜL (ref 1.85–7.62)
NEUTS SEG NFR BLD AUTO: 66 % (ref 43–75)
NRBC BLD AUTO-RTO: 0 /100 WBCS
PLATELET # BLD AUTO: 191 THOUSANDS/UL (ref 149–390)
PMV BLD AUTO: 11.1 FL (ref 8.9–12.7)
POTASSIUM SERPL-SCNC: 5.6 MMOL/L (ref 3.5–5.3)
RBC # BLD AUTO: 4.17 MILLION/UL (ref 3.88–5.62)
SODIUM SERPL-SCNC: 137 MMOL/L (ref 135–147)
WBC # BLD AUTO: 7.57 THOUSAND/UL (ref 4.31–10.16)

## 2022-10-28 PROCEDURE — 4B02XSZ MEASUREMENT OF CARDIAC PACEMAKER, EXTERNAL APPROACH: ICD-10-PCS | Performed by: INTERNAL MEDICINE

## 2022-10-28 RX ORDER — SODIUM CHLORIDE 9 MG/ML
75 INJECTION, SOLUTION INTRAVENOUS CONTINUOUS
Status: DISCONTINUED | OUTPATIENT
Start: 2022-10-28 | End: 2022-10-30

## 2022-10-28 RX ORDER — MAGNESIUM SULFATE HEPTAHYDRATE 40 MG/ML
2 INJECTION, SOLUTION INTRAVENOUS ONCE
Status: COMPLETED | OUTPATIENT
Start: 2022-10-28 | End: 2022-10-28

## 2022-10-28 RX ADMIN — SODIUM CHLORIDE 75 ML/HR: 0.9 INJECTION, SOLUTION INTRAVENOUS at 12:21

## 2022-10-28 RX ADMIN — SODIUM CHLORIDE, SODIUM GLUCONATE, SODIUM ACETATE, POTASSIUM CHLORIDE, MAGNESIUM CHLORIDE, SODIUM PHOSPHATE, DIBASIC, AND POTASSIUM PHOSPHATE 75 ML/HR: .53; .5; .37; .037; .03; .012; .00082 INJECTION, SOLUTION INTRAVENOUS at 09:43

## 2022-10-28 RX ADMIN — HEPARIN SODIUM 5000 UNITS: 5000 INJECTION INTRAVENOUS; SUBCUTANEOUS at 21:11

## 2022-10-28 RX ADMIN — EZETIMIBE 10 MG: 10 TABLET ORAL at 08:43

## 2022-10-28 RX ADMIN — COLCHICINE 0.6 MG: 0.6 TABLET ORAL at 08:43

## 2022-10-28 RX ADMIN — ALLOPURINOL 100 MG: 100 TABLET ORAL at 08:43

## 2022-10-28 RX ADMIN — HEPARIN SODIUM 5000 UNITS: 5000 INJECTION INTRAVENOUS; SUBCUTANEOUS at 14:12

## 2022-10-28 RX ADMIN — METOPROLOL SUCCINATE 50 MG: 50 TABLET, EXTENDED RELEASE ORAL at 08:43

## 2022-10-28 RX ADMIN — INSULIN LISPRO 2 UNITS: 100 INJECTION, SOLUTION INTRAVENOUS; SUBCUTANEOUS at 16:52

## 2022-10-28 RX ADMIN — LORATADINE 10 MG: 10 TABLET ORAL at 08:43

## 2022-10-28 RX ADMIN — TAMSULOSIN HYDROCHLORIDE 0.4 MG: 0.4 CAPSULE ORAL at 16:52

## 2022-10-28 RX ADMIN — MAGNESIUM SULFATE HEPTAHYDRATE 2 G: 40 INJECTION, SOLUTION INTRAVENOUS at 09:42

## 2022-10-28 RX ADMIN — COLCHICINE 0.6 MG: 0.6 TABLET ORAL at 17:25

## 2022-10-28 RX ADMIN — LEVOTHYROXINE SODIUM 100 MCG: 100 TABLET ORAL at 08:43

## 2022-10-28 NOTE — ASSESSMENT & PLAN NOTE
History of paroxysmal atrial fibrillation and sick sinus syndrome status post pacemaker  · Interrogate pacemaker

## 2022-10-28 NOTE — OCCUPATIONAL THERAPY NOTE
Occupational Therapy Evaluation     Patient Name: Karey Jacobs  SFMYT'K Date: 10/28/2022  Problem List  Principal Problem:    Chest pain  Active Problems:    Lightheadedness    Diarrhea    Acute renal failure superimposed on chronic kidney disease (HCC)    Dysphagia    Sick sinus syndrome (HCC)    Paroxysmal atrial fibrillation (HCC)    BPH (benign prostatic hyperplasia)    Obesity    Hyperkalemia    Type 2 diabetes mellitus ([facility] )    COVID-19    Past Medical History  Past Medical History:   Diagnosis Date    Diabetes ([facility] )     Essential tremor     Hyperlipidemia     Hypertension     Obstructive sleep apnea     Paroxysmal atrial fibrillation (HCC)     Sick sinus syndrome [facility])      Past Surgical History  Past Surgical History:   Procedure Laterality Date    CT NEEDLE BIOPSY LUNG  2/4/2021         10/28/22 0952   OT Last Visit   OT Visit Date 10/28/22   Note Type   Note type Evaluation   Pain Assessment   Pain Assessment Tool 0-10   Pain Score No Pain   Restrictions/Precautions   Weight Bearing Precautions Per Order No   Other Precautions Multiple lines; Fall Risk;Telemetry  (SCD)   Home Living   Type of Home Apartment  (Pt reports he lives in "[facility]" and states it is independent living community)   [id number] [facility] to enter with rails;Elevator  (0 ADELA)   Bathroom Shower/Tub Tub/shower unit   [employer] Raised   [employer] Grab bars around toilet;Grab bars in shower; Shower chair   Bathroom Accessibility Accessible   Home Equipment Walker;Cane  (Denies use pta)   Prior Function   Level of [place] Independent with ADLs; Independent with IADLS; Independent with functional mobility   Lives With Spouse   Receives Help From Family  (as needed)   IADLs Independent with driving; Independent with medication management; Independent with meal prep   Falls in the last 6 months 0   Vocational Retired   Lifestyle   Autonomy Pta, pt was independent w/ADLs, IADLs, and fnxl mobility   No use of AE at baseline - pt reports he has walker and cane available in the home if needed  +   Reciprocal Relationships Pt has social supports including his spouse  Service to Others Pt is retired  Intrinsic Gratification To maintain independence   Subjective   Subjective "I am going to miss my dentist appointment at 11"   ADL   Where Assessed Edge of bed   Eating Assistance 7  [address] 5  [address] [facility]  5  [address]  5  [address] 5  Supervision/Setup   Additional Comments S level of assistance at this time to ensure pt safety and well-being during ADLs  Bed Mobility   Rolling L 5  Supervision   Additional items Increased time required;Verbal cues   Supine to Sit 5  Supervision   Additional items Increased time required;Verbal cues   Additional Comments Pt was found supine in bed and left in bedside chair w/call bell in reach  Transfers   Sit to Stand 5  Supervision   Additional items Increased time required;Verbal cues   Stand to Sit 5  Supervision   Additional items Increased time required;Verbal cues   Additional Comments Pt required no use of AE for support during transitions as he demonstrates appropriate and safe behavior when transitioning  Pt demonstrates G safety awareness and insight into condition  Functional Mobility   Functional Mobility 5  Supervision   Additional Comments S w/no use of AE during fnxl mobility  Pt able to ambulate household distance + in hallway  Pt noted w/anxious behaviors about missing upcoming dentist appt today at 11:00am  Pt needed redirection from escalating, however, pt very anxious and asking to go back to room     Additional items   (No use of AE)   Balance   Static Sitting Good   Dynamic Sitting Fair +   Static Standing Fair   Dynamic Standing Fair   Ambulatory Fair   Activity Tolerance   Activity Tolerance Patient tolerated treatment well   Medical Staff Made Aware DPT Roosevelt Li OT Chrissy Wylie   Nurse Made Aware RN aware   RUE Assessment   RUE Assessment WFL   LUE Assessment   LUE Assessment WFL   Hand Function   Gross Motor Coordination Functional   Fine Motor Coordination Functional   Psychosocial   Psychosocial (WDL) X   Patient Behaviors/Mood Anxious   Cognition   Overall Cognitive Status WFL   Arousal/Participation Alert; Cooperative   Attention Attends with cues to redirect   Orientation Level Oriented X4   Memory Other (Comment)  (Pt reported that he would prefer to be at the dentists office over being in the hospital addressing his cardiac health issues because he has been awaiting dentist appt for 3 months )   Following Commands Follows one step commands with increased time or repetition   Comments Pt is able to follow conversation but noted with increasing anxious behaviors due to missing upcoming dentist session at 11:00am today  Pt was not redirectable  Pt demonstrates F safety awareness and insight into condition  Assessment   Prognosis Fair   Assessment Pt is a 53-year-old male admitted to B on 10/27/22 due to onset midsternal chest pain after taking shower this morning - pain radiating to back, left arm, and neck + left arm numbness  CTA chest abdomen pelvis indicating no evidence of aortic aneurysm, dissection, or intramural hematoma  OT eval orders active  Pt  has a past medical history of Diabetes ([address] ), Essential tremor, Hyperlipidemia, Hypertension, Obstructive sleep apnea, Paroxysmal atrial fibrillation ([address] ), and Sick sinus syndrome ([address] )  Pta, pt was independent w/ADLs, IADLs, and fnxl mobility  Pt is retired  Pt does not drive  No use of AE at baseline however pt reports he has a walker and cane available in the home if needed  Pt lives in a apt in [facility] w/0 ADELA w/spouse   Bathroom set-up includes standard tub/shower unit + raised toilet  DME includes gb in shower + near toilet + SC  Presently, pt is independent for eating, MOD I for grooming, S for UB bathing/dressing, LB bathing/dressing, toileting assistance + functional assistance, S for bed mobility, S for sit>stand +stand>sit (w/increased time), and S for functional mobility (w/increased time, min vc)  Pt able to ambulate household distance + in hallway  Pt noted w/anxious behaviors about missing upcoming dentist appt today at 11:00am  Pt needed redirection from escalating, however, pt very anxious and asking to go back to room  The patient's raw score on the AM-PAC Daily Activity inpatient short form is 22, standardized score is 47 1, greater than 39 4  Patients at this level are likely to benefit from discharge to home  Please refer to the recommendation of the Occupational Therapist for safe discharge planning  Pt demonstrates F safety awareness and insight into condition + at level of baseline functioning + adequate support system w/spouse  Therefore, from an OT standpoint, acute OT orders are not indicated at this time  D/c OT orders, please consult OT if re-eval is needed  Goals   Patient Goals to get to the dentist's office   Recommendation   OT Discharge Recommendation No rehabilitation needs   AM-PAC Daily Activity Inpatient   Lower Body Dressing 3   Bathing 3   Toileting 4   Upper Body Dressing 4   Grooming 4   Eating 4   Daily Activity Raw Score 22   Daily Activity Standardized Score (Calc for Raw Score >=11) 47  1   AM-PAC Applied Cognition Inpatient   Following a Speech/Presentation 4   Understanding Ordinary Conversation 4   Taking Medications 4   Remembering Where Things Are Placed or Put Away 3   Remembering List of 4-5 Errands 3   Taking Care of Complicated Tasks 3   Applied Cognition Raw Score 21   Applied Cognition Standardized Score 44 3   Modified [place] Scale   Modified [place] Scale 3   End of Consult   Education Provided Yes   Patient Position at End of Consult Bedside chair; All needs within reach   Nurse Communication Nurse aware of consult     Jairo Rutherford, OTS

## 2022-10-28 NOTE — ASSESSMENT & PLAN NOTE
Per wife and patient diarrhea almost since COVID, 3 weeks ago  · Usually it's once a day but it can happen up to 3 times a day, possibly covid-19 related  · Check C diff and enteric panel for completeness

## 2022-10-28 NOTE — ASSESSMENT & PLAN NOTE
Patient tested positive for COVID about 3 weeks ago, at that time he had sore throat, denies having any respiratory issues  He received treatment for COVID as outpatient  · CT chest without pneumonia  · Supportive care   On room air

## 2022-10-28 NOTE — PHYSICAL THERAPY NOTE
Physical Therapy Evaluation    Patient's Name: Ken Ill    Admitting Diagnosis  Chest pain [R07 9]  Atypical chest pain [R07 89]  DANY (acute kidney injury) ([facility] [address] ) [N17 9]    Problem List  Patient Active Problem List   Diagnosis    Lightheadedness    Chest pain    Diarrhea    Acute renal failure superimposed on chronic kidney disease (HCC)    Dysphagia    Sick sinus syndrome (HCC)    Paroxysmal atrial fibrillation (HCC)    BPH (benign prostatic hyperplasia)    Obesity    Hyperkalemia    Type 2 diabetes mellitus ([facility] [address] )    COVID-19       Past Medical History  Past Medical History:   Diagnosis Date    Diabetes ([facility] )     Essential tremor     Hyperlipidemia     Hypertension     Obstructive sleep apnea     Paroxysmal atrial fibrillation (HCC)     Sick sinus syndrome [facility])        Past Surgical History  Past Surgical History:   Procedure Laterality Date    CT NEEDLE BIOPSY LUNG  2/4/2021        10/28/22 0953   PT Last Visit   PT Visit Date 10/28/22   Note Type   Note type Evaluation   Pain Assessment   Pain Assessment Tool 0-10   Pain Score No Pain   Restrictions/Precautions   Weight Bearing Precautions Per Order No   Other Precautions Chair Alarm; Bed Alarm;Multiple lines   Home Living   Type of Home Apartment  [facility] (ILF per pt))   Home Layout   (0 ADELA)   Bathroom Shower/Tub Tub/shower unit   Bathroom Toilet Raised   Bathroom Equipment Grab bars in shower; Shower chair;Grab bars around toilet   [address] [place];Cane   Prior Function   Level of [place] Independent with ADLs; Independent with IADLS; Independent with functional mobility   Lives With Spouse   Receives Help From Family  (as needed)   IADLs Independent with driving; Independent with medication management; Independent with meal prep   Falls in the last 6 months 0   Vocational Retired   Comments At baseline pt is I w/ ambulation w/o AD     General   Family/Caregiver Present No   Cognition   Overall Cognitive Status WFL   Arousal/Participation Alert   Attention Attends with cues to redirect   Orientation Level Oriented X4   Comments overall pleasant + cooperative, anxious about missing his dental appointment today   Subjective   Subjective Pt agreeable to mobilize  RLE Assessment   RLE Assessment WFL   LLE Assessment   LLE Assessment WFL   Coordination   Movements are Fluid and Coordinated 1   Bed Mobility   Rolling L 5  Supervision   Additional items Increased time required;Verbal cues   Supine to Sit 5  Supervision   Additional items Increased time required;Verbal cues   Additional Comments Pt greeted in supine  Transfers   Sit to Stand 5  Supervision   Additional items Increased time required;Verbal cues   Stand to Sit 5  Supervision   Additional items Increased time required;Verbal cues   Additional Comments no AD   Ambulation/Elevation   Gait pattern   (decreased gait velocity)   Gait Assistance 5  Supervision   Additional items Verbal cues   Assistive Device None   Distance 200'   Balance   Static Sitting Good   Dynamic Sitting Fair +   Static Standing Fair   Dynamic Standing Fair   Ambulatory Fair   Endurance Deficit   Endurance Deficit No   Activity Tolerance   Activity Tolerance Patient tolerated treatment well   Medical Staff Made Aware OTS Jose Cormier, OT Codie Lau CM during rounds   Nurse Made Aware yes - cleared for therapy   Assessment   Assessment Pt is [de-identified] y o  male seen for a moderate complexity PT evaluation s/p admit to [facility] on 10/27/2022  Pt presenting w/ principal dx of chest pain  Please see above for other active problem list / PMH  PT now consulted to assess functional mobility and needs for safe d/c planning  Prior to admission, pt was I w/ ambulation w/o AD, lives w/ spouse in a residence w/o stairs  Currently pt is S for bed skills; S for functional transfers; S for ambulation w/o AD  Pt presents near functional baseline  No further skilled acute PT needs   Pt encouraged to continue mobilizing to prevent weakness  Can ambulate w/ restorative/nursing staff when connected to lines  Will d/c from caseload  Barriers to Discharge None   Goals   Patient Goals get to my dental appointment   PT Treatment Day 0   Plan   PT Frequency   (d/c PT)   Recommendation   PT Discharge Recommendation (S)  No rehabilitation needs   AM-PAC Basic Mobility Inpatient   Turning in Bed Without Bedrails 4   Lying on Back to Sitting on Edge of Flat Bed 3   Moving Bed to Chair 3   Standing Up From Chair 3   Walk in Room 3   Climb 3-5 Stairs 3   Basic Mobility Inpatient Raw Score 19   Basic Mobility Standardized Score 42 48   Highest Level Of Mobility   JH-HLM Goal 6: Walk 10 steps or more   JH-HLM Achieved 7: Walk 25 feet or more   End of Consult   Patient Position at End of Consult Bedside chair; All needs within reach  (on waffle cushion, all lines in tact)     Aida Macias, PT, DPT

## 2022-10-28 NOTE — ASSESSMENT & PLAN NOTE
Lab Results   Component Value Date    EGFR 35 10/28/2022    EGFR 29 10/27/2022    CREATININE 1 77 (H) 10/28/2022    CREATININE 2 07 (H) 10/27/2022   ·   · Etiology: Poor po intake, COVID-19, diarrhea  · Baseline creatinine from 1 1-1 4  · Creatinine on admission 2 07  · Monitor creatinine in the next 24-48 hours, patient received contrast for CTA  · Bladder scan  · Patient is on Lasix 20 mg daily and lisinopril 10 mg daily, both are held  · Switch plasmalyte to normal saline due to hyperkalemia  · Monitor

## 2022-10-28 NOTE — PROGRESS NOTES
[address] [facility]  Progress Note - Tiny Rumble 1940, [de-identified] y o  male MRN: [id number]  Unit/Bed#: [facility] 523-01 Encounter: [id number]  Primary Care Provider: Liliane Gates MD   Date and time admitted to hospital: 10/27/2022 11:45 AM    * Chest pain  Assessment & Plan  Chest pain: Midsternal with radiation to his back and left arm  Improved with nitroglycerin  · He is following [facility]iology, he was evaluated in September for midsternal chest pain  09/26/2022 he underwent pharmacological stress test which was negative  · CTA chest abdomen pelvis showed no evidence of aortic aneurysm, dissection or intramural hematoma  · Pain is not reproducible  · Telemetry  · Cardiology evaluation    Results from last 7 days   Lab Units 10/27/22  1740 10/27/22  1414 10/27/22  1207   HS TNI 0HR ng/L  --   --  10   HS TNI 2HR ng/L  --  11  --    HS TNI 4HR ng/L 10  --   --          COVID-19  Assessment & Plan  Patient tested positive for COVID about 3 weeks ago, at that time he had sore throat, denies having any respiratory issues  He received treatment for COVID as outpatient  · CT chest without pneumonia  · Supportive care  On room air    Type 2 diabetes mellitus [facility])  Assessment & Plan  Lab Results   Component Value Date    HGBA1C 9 5 (H) 05/11/2022       Recent Labs     10/27/22  1940 10/27/22  2122 10/27/22  2343 10/28/22  0632   POCGLU 73 66 114 81       Blood Sugar Average: Last 72 hrs:  · (P) 83 5 patient is on Tresiba 50 units in the morning and 80 units in the evening  · He did not take this morning dose of Tresiba because his blood sugar was in the 50s  · Will start Lantus 20 units HS and insulin sliding scale  · Diabetic diet    Hyperkalemia  Assessment & Plan  · Improving, possibly DANY related   Plasmalyte discontinued    Recent Labs     10/27/22  1207 10/28/22  0450   K 5 8* 5 6*   MG  --  1 3*         BPH (benign prostatic hyperplasia)  Assessment & Plan  · Continue tamsulosin    Paroxysmal atrial fibrillation (HCC)  Assessment & Plan  · On metoprolol succinate 50 mg daily  · AC: On Pradaxa which has been on hold since two days ago for tooth extraction that's scheduled for today 10/28/2022  · Likely will have to restart Pradaxa in the next 24 hours once patient confirms that his procedure will not occur in the next few days    Sick sinus syndrome [facility])  Assessment & Plan  History of paroxysmal atrial fibrillation and sick sinus syndrome status post pacemaker  · Interrogate pacemaker    Dysphagia  Assessment & Plan  Patient has a feeling food gets stuck in his throat for the past week  · Contributes it to the pills that he got for COVID  · Speech eval, possible barium swallow eval if necessary    Acute renal failure superimposed on chronic kidney disease [facility])  Assessment & Plan  Lab Results   Component Value Date    EGFR 35 10/28/2022    EGFR 29 10/27/2022    CREATININE 1 77 (H) 10/28/2022    CREATININE 2 07 (H) 10/27/2022   ·   · Etiology: Poor po intake, COVID-19, diarrhea  · Baseline creatinine from 1 1-1 4  · Creatinine on admission 2 07  · Monitor creatinine in the next 24-48 hours, patient received contrast for CTA  · Bladder scan  · Patient is on Lasix 20 mg daily and lisinopril 10 mg daily, both are held  · Switch plasmalyte to normal saline due to hyperkalemia  · Monitor    Diarrhea  Assessment & Plan  Per wife and patient diarrhea almost since COVID, 3 weeks ago  · Usually it's once a day but it can happen up to 3 times a day, possibly covid-19 related  · Check C diff and enteric panel for completeness    Lightheadedness  Assessment & Plan  Lightheadedness for over a week, suspect orthostatic since it happens after he sits up and stands up  Near syncopal episode  · Possibly due to dehydration?   · Also requested Interrogatation via cardiology for completion  · Orthostatic vitals  · IV hydration      VTE Pharmacologic Prophylaxis:   Pharmacologic: Heparin  Mechanical VTE Prophylaxis in Place: Yes    Discussions with Specialists or Other Care Team Provider: nursing    Education and Discussions with Family / Patient: lena thurman    Time Spent for Care: 30 minutes  More than 50% of total time spent on counseling and coordination of care as described above  Current Length of Stay: 0 day(s)    Current Patient Status: Inpatient   Certification Statement: The patient will continue to require additional inpatient hospital stay due to chest pain, cardiology eval, c diff testing, iv hydration, shima management    Discharge Plan: active, 24 hours    Code Status: Level 1 - Full Code      Subjective:   Patient seen and examined at bedside  Complaining of swallowing issues, chest pain, and occasional lightheadedness  No diarrhea today  Objective:     Vitals:   Temp (24hrs), Av 6 °F (36 4 °C), Min:97 5 °F (36 4 °C), Max:97 6 °F (36 4 °C)    Temp:  [97 5 °F (36 4 °C)-97 6 °F (36 4 °C)] 97 6 °F (36 4 °C)  HR:  [60-81] 81  Resp:  [16-20] 16  BP: (103-169)/() 116/58  SpO2:  [92 %-99 %] 95 %  Body mass index is 43 1 kg/m²  Input and Output Summary (last 24 hours): Intake/Output Summary (Last 24 hours) at 10/28/2022 1056  Last data filed at 10/28/2022 0846  Gross per 24 hour   Intake 1157 5 ml   Output 900 ml   Net 257 5 ml       Physical Exam:     Physical Exam  Vitals reviewed  HENT:      Head: Normocephalic  Nose: Nose normal       Mouth/Throat:      Mouth: Mucous membranes are moist    Eyes:      General: No scleral icterus  Cardiovascular:      Rate and Rhythm: Normal rate  Pulmonary:      Effort: Pulmonary effort is normal  No respiratory distress  Abdominal:      General: There is no distension  Palpations: Abdomen is soft  Tenderness: There is no abdominal tenderness  Skin:     General: Skin is warm  Neurological:      Mental Status: He is alert and oriented to person, place, and time  Mental status is at baseline     Psychiatric: Mood and Affect: Mood normal          Behavior: Behavior normal        Additional Data:     Labs:    Results from last 7 days   Lab Units 10/28/22  0347   WBC Thousand/uL 7 57   HEMOGLOBIN g/dL 11 9*   HEMATOCRIT % 38 3   PLATELETS Thousands/uL 191   NEUTROS PCT % 66   LYMPHS PCT % 22   MONOS PCT % 9   EOS PCT % 2     Results from last 7 days   Lab Units 10/28/22  0450 10/27/22  1207   SODIUM mmol/L 137 135   POTASSIUM mmol/L 5 6* 5 8*   CHLORIDE mmol/L 109* 111*   CO2 mmol/L 21 18*   BUN mg/dL 45* 46*   CREATININE mg/dL 1 77* 2 07*   ANION GAP mmol/L 7 6   CALCIUM mg/dL 9 1 9 4   ALBUMIN g/dL  --  3 2*   TOTAL BILIRUBIN mg/dL  --  0 54   ALK PHOS U/L  --  53   ALT U/L  --  36   AST U/L  --  20   GLUCOSE RANDOM mg/dL 108 157*         Results from last 7 days   Lab Units 10/28/22  0632 10/27/22  2343 10/27/22  2122 10/27/22  1940   POC GLUCOSE mg/dl 81 114 66 73                   * I Have Reviewed All Lab Data Listed Above  * Additional Pertinent Lab Tests Reviewed:  Shawna [address] Admission Reviewed      Lines:  Invasive Devices  Report    Peripheral Intravenous Line  Duration           Peripheral IV 10/27/22 Left Wrist <1 day               Imaging:    Imaging Reports Reviewed Today Include: xr chest, cta cap    Recent Cultures (last 7 days):           Last 24 Hours Medication List:   Current Facility-Administered Medications   Medication Dose Route Frequency Provider Last Rate   • albuterol  2 puff Inhalation Q6H PRN Margy Monet MD     • allopurinol  100 mg Oral Daily Margy Monet MD     • colchicine  0 6 mg Oral BID Margy Monet MD     • ezetimibe  10 mg Oral Daily Margy Monet MD     • heparin (porcine)  5,000 Units Subcutaneous Q8H [facility] Margy Monet MD     • insulin lispro  2-12 Units Subcutaneous TID AC Margy Monet MD     • levothyroxine  100 mcg Oral Early Morning Margy Monet MD     • loratadine  10 mg Oral Daily Margy Monet MD     • magnesium sulfate  2 g Intravenous Once Regulo Shea MD     • metoprolol succinate  50 mg Oral Daily Maximus Patino MD     • sodium chloride  75 mL/hr Intravenous Continuous Regulo Shea MD     • tamsulosin  0 4 mg Oral Daily With Sg Moraes MD          Today, Patient Was Seen By: Staci Dennis    ** Please Note: Dictation voice to text software may have been used in the creation of this document   **

## 2022-10-28 NOTE — CONSULTS
Reason for Consult / Principal Problem:  Chest pain and dizziness    Physician Requesting Consult:  Tc Daniels MD    Cardiologist: Dr Flako Rodríguez at [facility]      Assessment and Plan      Current Problem List   Principal Problem:    Chest pain  Active Problems:    Lightheadedness    Diarrhea    Acute renal failure superimposed on chronic kidney disease (HCC)    Dysphagia    Sick sinus syndrome (HCC)    Paroxysmal atrial fibrillation (HCC)    BPH (benign prostatic hyperplasia)    Obesity    Hyperkalemia    Type 2 diabetes mellitus ([facility] [address] )    COVID-19    Assessment/Plan: This is [de-identified] year male with past medical history of hypertension, hyperlipidemia, atrial fibrillation, sick sinus syndrome status post pacemaker, BPH, type 2 diabetes, dysphagia who initially presents with chief complaint of chest pain and dizziness  Patient had episode of COVID-19 3 weeks ago and has been having diarrhea for last 3 weeks  Cardiology was consulted for further management of chest pain and pacemaker interrogation  Patient follows with cardiologist at [facility]  # chest pain- noncardiac in origin  Patient has recent stress test which was unremarkable  Troponin negative  EKG without ischemic changes  # dizziness- in setting of diarrhea for 3 weeks  # DANY on CKD  # diarrhea for 3 weeks  # dysphagia  # recent COVID-19 infection  # type 2 diabetes  # paroxysmal AFib, sick sinus syndrome status post [employer] pacemaker- on Pradaxa at home    Plan:    · His chest pain is likely noncardiac  EKG and troponin unremarkable  Recent nuclear stress test was unremarkable  Chest pain which is musculoskeletal and resolved now  Pain is reproducible on exam  No further workup needed  · Will perform pacemaker interrogation  · Continue home Toprol XL and ezetimibe  · Hold Lasix and lisinopril in setting of DANY, recent diarrhea and dizziness  · Pradaxa on hold given plan for tooth extraction    Should be restarted after tooth extraction once okay from surgeon  · Rest of the care per primary team                 Subjective     CC:  Chest pain and dizziness    HPI: Karey Jacobs [de-identified]y o  year old male with past medical history of hypertension, hyperlipidemia, atrial fibrillation, sick sinus syndrome status post pacemaker, BPH, type 2 diabetes, dysphagia who presents with chest pain and dizziness  Patient developed midsternal chest pain after taking a shower in the morning and was radiating to his left arm and neck  Patient called 911 and received nitroglycerin which helped with his pain  Patient's blood pressure also dropped after getting nitroglycerin  Patient also complains of 1 week of lightheadedness  He has a history of recent COVID-19 infection 3 weeks ago and has been having poor p o  Intake along with diarrhea which has been daily  Yesterday, pt was taking shower when he had chest pain and SOB  EMS was called and he was take to ER  He was given nitro by EMS which improved his chest pain after one hour and he became hypotensive  He is not admitted for DANY and diarrhea  On my exam, pt is alert and monisha x3  He denies any chest pain  He is partially active at baseline  He does has some chest pain at home at rest and it stays same with exertion  He did have NM stress 9/26 ordered by her [facility] cardiologist which was unremarkable  Nothing helps his chest pain  Chest pain does not improve with anything         Family History: non-contributory  Historical Information   Past Medical History:   Diagnosis Date   • Diabetes [facility])    • Essential tremor    • Hyperlipidemia    • Hypertension    • Obstructive sleep apnea    • Paroxysmal atrial fibrillation (HCC)    • Sick sinus syndrome [facility])      Past Surgical History:   Procedure Laterality Date   • CT NEEDLE BIOPSY LUNG  2/4/2021     Social History   Social History     Substance and Sexual Activity   Alcohol Use None     Social History     Substance and Sexual Activity   Drug Use Not on file     Social History     Tobacco Use   Smoking Status Former Smoker   Smokeless Tobacco Never Used     Family History: History reviewed  No pertinent family history  Review of Systems:  Review of Systems   Constitutional: Negative for activity change, chills, diaphoresis, fatigue and fever  HENT: Negative for congestion, rhinorrhea, sinus pressure and sinus pain  Respiratory: Negative for apnea, cough, shortness of breath and stridor  Cardiovascular: Positive for chest pain  Negative for palpitations and leg swelling  Chest pain which is reproducible on exam   Gastrointestinal: Negative for abdominal distention, abdominal pain, blood in stool, constipation and diarrhea  Endocrine: Negative for cold intolerance, heat intolerance and polyuria  Genitourinary: Negative for difficulty urinating  Musculoskeletal: Negative for arthralgias, back pain, joint swelling and myalgias  Skin: Negative for color change, pallor, rash and wound  Neurological: Negative for dizziness, seizures, syncope, light-headedness, numbness and headaches  Psychiatric/Behavioral: Negative for agitation and hallucinations  The patient is not nervous/anxious and is not hyperactive              Scheduled Meds:  Current Facility-Administered Medications   Medication Dose Route Frequency Provider Last Rate   • albuterol  2 puff Inhalation Q6H PRN Alirio Stanley MD     • allopurinol  100 mg Oral Daily Alirio Stanley MD     • colchicine  0 6 mg Oral BID Alirio Stanley MD     • ezetimibe  10 mg Oral Daily Alirio Stanley MD     • heparin (porcine)  5,000 Units Subcutaneous Q8H [address] Alirio Stanley MD     • insulin lispro  2-12 Units Subcutaneous TID AC Alirio Stanley MD     • levothyroxine  100 mcg Oral Early Morning Alirio Stanley MD     • loratadine  10 mg Oral Daily Alirio Stanley MD     • metoprolol succinate  50 mg Oral Daily Alirio Stanley MD     • sodium chloride  75 mL/hr Intravenous Continuous Zoila Rader MD 75 mL/hr (10/28/22 1221)   • tamsulosin  0 4 mg Oral Daily With Dinner Chip Verduzco MD       Continuous Infusions:sodium chloride, 75 mL/hr, Last Rate: 75 mL/hr (10/28/22 1221)      PRN Meds: •  albuterol  all current active meds have been reviewed    Allergies   Allergen Reactions   • Asa [Aspirin] GI Bleeding   • Januvia [Sitagliptin] Hives   • Canagliflozin Rash       Objective   Vitals: Temp (24hrs), Av 6 °F (36 4 °C), Min:97 5 °F (36 4 °C), Max:97 8 °F (36 6 °C)  Current: Temperature: 97 8 °F (36 6 °C)  Patient Vitals for the past 24 hrs:   BP Temp Temp src Pulse Resp SpO2 Height Weight   10/28/22 1106 117/63 97 8 °F (36 6 °C) -- 70 -- 98 % -- --   10/28/22 0745 116/58 97 6 °F (36 4 °C) -- 81 -- 95 % -- --   10/28/22 0228 128/58 -- -- 60 -- 98 % -- --   10/27/22 2330 123/64 -- -- 64 -- 98 % 5' 10" (1 778 m) --   10/27/22 2200 117/56 -- -- 68 16 97 % -- --   10/27/22 2100 134/63 -- -- 68 16 92 % -- --   10/27/22 2000 152/76 -- -- 80 18 99 % -- --   10/27/22 1851 -- -- -- -- -- -- -- (!) 136 kg (300 lb 6 4 oz)   10/27/22 1800 161/71 97 5 °F (36 4 °C) Oral 69 20 98 % -- --   10/27/22 1500 103/52 -- -- 62 20 95 % -- --    Body mass index is 43 1 kg/m²  Orthostatic Blood Pressures    Flowsheet Row Most Recent Value   Blood Pressure 117/63 filed at 10/28/2022 1106   Patient Position - Orthostatic VS Lying filed at 10/27/2022 2000              Invasive Devices  Report    Peripheral Intravenous Line  Duration           Peripheral IV 10/27/22 Left Wrist 1 day                Physical Exam:  Physical Exam  Constitutional:       General: He is not in acute distress  Appearance: He is well-developed  He is not diaphoretic  HENT:      Head: Normocephalic and atraumatic  Nose: Nose normal       Mouth/Throat:      Pharynx: No oropharyngeal exudate  Eyes:      General: No scleral icterus  Right eye: No discharge  Left eye: No discharge  Cardiovascular:      Rate and Rhythm: Normal rate and regular rhythm  Heart sounds: Normal heart sounds  No murmur heard  No friction rub  No gallop  Comments: Chest pain on palpation of left side chest  Pulmonary:      Effort: Pulmonary effort is normal  No respiratory distress  Breath sounds: No stridor  No wheezing or rales  Abdominal:      General: Bowel sounds are normal  There is no distension  Palpations: Abdomen is soft  Tenderness: There is no abdominal tenderness  There is no guarding  Musculoskeletal:         General: Normal range of motion  Cervical back: Normal range of motion and neck supple  Skin:     General: Skin is warm  Findings: No erythema  Neurological:      Mental Status: He is alert and oriented to person, place, and time               Lab Results:   Results from last 7 days   Lab Units 10/28/22  0347 10/27/22  1207   WBC Thousand/uL 7 57 6 86   HEMOGLOBIN g/dL 11 9* 12 2   HEMATOCRIT % 38 3 37 7   PLATELETS Thousands/uL 191 190   NEUTROS PCT % 66 71   MONOS PCT % 9 8      Results from last 7 days   Lab Units 10/28/22  0450 10/27/22  1207   SODIUM mmol/L 137 135   POTASSIUM mmol/L 5 6* 5 8*   CHLORIDE mmol/L 109* 111*   CO2 mmol/L 21 18*   BUN mg/dL 45* 46*   CREATININE mg/dL 1 77* 2 07*   CALCIUM mg/dL 9 1 9 4   ALK PHOS U/L  --  53   ALT U/L  --  36   AST U/L  --  20   MAGNESIUM mg/dL 1 3*  --    EGFR ml/min/1 73sq m 35 29                 No results found for: PHART, FTL0KUP, PO2ART, ZRD7NPQ, A3TNRXHS, BEART, SOURCE  No components found for: HIV1X2  No results found for: HAV, HEPAIGM, HEPBIGM, HEPBCAB, HBEAG, HEPCAB  No results found for: SPEP, UPEP   Lab Results   Component Value Date    HGBA1C 9 5 (H) 05/11/2022    HGBA1C 8 1 (H) 02/07/2022    HGBA1C 7 7 (H) 08/27/2021     No results found for: CHOL No results found for: HDL No results found for: LDLCALC No results found for: TRIG  No components found for: PROCAL          Imaging: I have personally reviewed pertinent reports

## 2022-10-28 NOTE — UTILIZATION REVIEW
NOTIFICATION OF INPATIENT ADMISSION   AUTHORIZATION REQUEST   SERVICING FACILITY:   [facility]  Address: [address] [place] [address], [address] [place] [address]  Tax ID: [id number]  NPI: [id number] ATTENDING PROVIDER:  Attending Name and NPI#: Kory Clark Md [[id number]]  Address: [address] [place] [address], [address] [place] [address]  Phone: [phone]   ADMISSION INFORMATION:  Place of Service: Inpatient [facility]  [address]  Place of Service Code: 21  Inpatient Admission Date/Time: 10/28/22 10:56 AM  Discharge Date/Time: No discharge date for patient encounter  Admitting Diagnosis Code/Description:  Chest pain [R07 9]  Atypical chest pain [R07 89]  DANY (acute kidney injury) ([facility]) [N17 9]     UTILIZATION REVIEW CONTACT:  Rajni Augustin Utilization   Network Utilization Review Department  Phone: [phone]  Fax: [phone]  Email: [email or web address]  Contact for approvals/pending authorizations, clinical reviews, and discharge  PHYSICIAN ADVISORY SERVICES:  Medical Necessity Denial & Jsld-ix-Conx Review  Phone: [phone]  Fax: [phone]  Email: [email or web address]

## 2022-10-28 NOTE — ASSESSMENT & PLAN NOTE
· Improving, possibly DANY related   Plasmalyte discontinued    Recent Labs     10/27/22  1207 10/28/22  0450   K 5 8* 5 6*   MG  --  1 3*

## 2022-10-28 NOTE — CASE MANAGEMENT
Case Management Assessment & Discharge Planning Note    Patient name Asif Vance  Location [address] [place] [address] [address]N [id number]  : 1940 Date 10/28/2022       Current Admission Date: 10/27/2022  Current Admission Diagnosis:Chest pain   Patient Active Problem List    Diagnosis Date Noted   • Lightheadedness 10/27/2022   • Chest pain 10/27/2022   • Diarrhea 10/27/2022   • Acute renal failure superimposed on chronic kidney disease ([address] ) 10/27/2022   • Dysphagia 10/27/2022   • Sick sinus syndrome ([facility] ) 10/27/2022   • Paroxysmal atrial fibrillation ([facility] ) 10/27/2022   • BPH (benign prostatic hyperplasia) 10/27/2022   • Obesity 10/27/2022   • Hyperkalemia 10/27/2022   • Type 2 diabetes mellitus ([facility] ) 10/27/2022   • COVID-19 10/27/2022      LOS (days): 0  Geometric Mean LOS (GMLOS) (days): 3 60  Days to GMLOS:3 5     OBJECTIVE:    Risk of Unplanned Readmission Score: 14 6         Current admission status: Inpatient       Preferred Pharmacy: No Pharmacies Listed  Primary Care Provider: Gus Pineda MD    Primary Insurance: [address] [place] [address]  Secondary Insurance:     ASSESSMENT:  [address] 26 Proxies    There are no active Health Care Proxies on file  Readmission Root Cause  30 Day Readmission: No    Patient Information  Admitted from[de-identified] Home  Mental Status: Other (Comment) (did not meet w/pt  spoke w/ spouse via phone)  Assessment information provided by[de-identified] Spouse (spoke w/ spouse via phone)  Primary Caregiver: Self  Caregiver's Relationship to Patient[de-identified] Family Member ([place])  Support Systems: Self, Spouse/significant other, Daughter  [place] of Residence: [address] [place] [address] do you live in?: [address] entry access options   Select all that apply : Elevator, Stairs  Number of steps to enter home :  (5 flights or elevator)  Do the steps have railings?: Yes  Type of Current Residence: Apartment  Floor Level: 5  Upon entering residence, is there a bedroom on the main floor (no further steps)?: Yes  Upon entering residence, is there a bathroom on the main floor (no further steps)?: Yes  In the last 12 months, was there a time when you were not able to pay the mortgage or rent on time?: No  In the last 12 months, was there a time when you did not have a steady place to sleep or slept in a shelter (including now)?: No  Living Arrangements: Lives w/ Spouse/significant other    Activities of Daily Living Prior to Admission  Functional Status: Independent  Completes ADLs independently?: Yes  Ambulates independently?: Yes  Does patient use assisted devices?: No  Does patient currently own DME?: No  Does patient have a history of Outpatient Therapy (PT/OT)?: No  Does the patient have a history of Short-Term Rehab?: No  Does patient have a history of HHC?: No  Does patient currently have [place] [address]?: No      Patient Information Continued  Does patient receive dialysis treatments?: No  Does patient have a history of substance abuse?: No  Does patient have a history of Mental Health Diagnosis?: No    Means of Transportation  Means of Transport to [facility][de-identified] Family transport        DISCHARGE DETAILS:    Discharge planning discussed with[de-identified] Osbaldo Rollins        CM contacted family/caregiver?: Yes (CM Resident spoke w/ Pts wife via phone)     CM Resident spoke with Pts wife Linsey Womack to discuss eventual discharge plan  ST/OT pending  D/C TBD  CM will continue to follow

## 2022-10-28 NOTE — ASSESSMENT & PLAN NOTE
Chest pain: Midsternal with radiation to his back and left arm  Improved with nitroglycerin  · He is following [facility], he was evaluated in September for midsternal chest pain    09/26/2022 he underwent pharmacological stress test which was negative  · CTA chest abdomen pelvis showed no evidence of aortic aneurysm, dissection or intramural hematoma  · Pain is not reproducible  · Telemetry  · Cardiology evaluation    Results from last 7 days   Lab Units 10/27/22  1740 10/27/22  1414 10/27/22  1207   HS TNI 0HR ng/L  --   --  10   HS TNI 2HR ng/L  --  11  --    HS TNI 4HR ng/L 10  --   --

## 2022-10-28 NOTE — ASSESSMENT & PLAN NOTE
Lab Results   Component Value Date    HGBA1C 9 5 (H) 05/11/2022       Recent Labs     10/27/22  1940 10/27/22  2122 10/27/22  2343 10/28/22  0632   POCGLU 73 66 114 81       Blood Sugar Average: Last 72 hrs:  · (P) 83 5 patient is on Tresiba 50 units in the morning and 80 units in the evening  · He did not take this morning dose of Tresiba because his blood sugar was in the 50s  · Will start Lantus 20 units HS and insulin sliding scale  · Diabetic diet

## 2022-10-28 NOTE — ASSESSMENT & PLAN NOTE
· On metoprolol succinate 50 mg daily  · AC:  On Pradaxa which has been on hold since two days ago for tooth extraction that's scheduled for today 10/28/2022  · Likely will have to restart Pradaxa in the next 24 hours once patient confirms that his procedure will not occur in the next few days

## 2022-10-28 NOTE — ASSESSMENT & PLAN NOTE
Lightheadedness for over a week, suspect orthostatic since it happens after he sits up and stands up  Near syncopal episode  · Possibly due to dehydration?   · Also requested Interrogatation via cardiology for completion  · Orthostatic vitals  · IV hydration

## 2022-10-28 NOTE — ASSESSMENT & PLAN NOTE
Patient has a feeling food gets stuck in his throat for the past week  · Contributes it to the pills that he got for COVID  · Speech eval, possible barium swallow eval if necessary

## 2022-10-28 NOTE — UTILIZATION REVIEW
Initial Clinical Review  Observation on 10/27 @ [phone] upgraded to Inpatient on 10/28 @ 1056  Pt requiring continued stay for chest pain, cardiology eval, c diff testing, iv hydration, shima management    Admission: Date/Time/Statement:   Admission Orders (From admission, onward)     Ordered        10/28/22 1056  Inpatient Admission  Once            10/27/22 1711  Place in Observation  Once                      Orders Placed This Encounter   Procedures   • Inpatient Admission     Standing Status:   Standing     Number of Occurrences:   1     Order Specific Question:   Level of Care     Answer:   Med Surg [16]     Order Specific Question:   Estimated length of stay     Answer:   More than 2 Midnights     Order Specific Question:   Certification     Answer:   I certify that inpatient services are medically necessary for this patient for a duration of greater than two midnights  See H&P and MD Progress Notes for additional information about the patient's course of treatment  Comments:   cardiology eval, interrogation, speech eval, shima managemnet, hyperkalemia     ED Arrival Information     Expected   -    Arrival   10/27/2022 11:43    Acuity   Urgent            Means of arrival   Ambulance    Escorted by   [facility]    Admission type   Emergency            Arrival complaint   Chest Pain           Chief Complaint   Patient presents with   • Chest Pain     Pt c/o of pain in chest and upper back; 1 nitro given by EMS, no aspirin given; chest pain resolved  Pt c/o dizziness and blurred vision with chest pain this AM         Initial Presentation: [de-identified] y o  male with PMH of HTN, HLD, BPH, hypothyroidism, paroxysmal Afib, SSS s/p PPM, CHANO, CKD3 presented to the ED from home via EMS with chest pain and lightheadedness after taking a shower this am  Pt described chest pain as mid sternal, radiating to his back, arm and neck, associated w/ L arm numbness   He called 911, given a dose of Nitro by EMS w/c alleviate chest pain but his BP dropped  Pt reported that he was dx w/ COVID 3 weeks ago, had 3 weeks of watery diarrhea 1-3x/day, had poor po intake over a week, c/o bad taste in his mouth after he took the COVID pills  Reported lightheadedness x 1 wk  Bld sugar this am in the 50s  Reported feeling food getting stuck in his throat, contributed to the pills he was taking for COVID    In the ED, /102, trop neg x 2, EKG V paced, no ischemic changes  MARY score 3  CTA chest abdomen pelvis showed no evidence of aortic aneurysm, dissection or intramural hematoma, no pericardial effusion seen  Cr 2 07  K 5 8  Given Insulin, dextrose 50% IV 25 ml, started on IVF  Admit as observation level of care for  Chest pain, lightheadedness, diarrhea, DANY, dysphagia, hyperkalemia: Continue to cycle troponin  Telemetry  Consider cardiology consult if pain reoccurs  Check C diff and enteric panel  Interrogate pacemaker  Gentle hydration with Plasma-Lyte 75 cc/hour  Orthostatic vitals  Monitor creatinine in the next 24-48 hours  Bladder scan  Repeat BMP tomorrow   Speech eval, possible barium swallow eval if needed     10/28  IM Notes: Pt c/o of swallowing issues, chest pain and occasional lightheadedness  DANY improving  Plan: Cont IV hydration  Switch plasmalyte to normal saline due to hyperkalemia  Cardiology consult  PPM interrogation  Cont to mon on tele  Cont to mon Cr, K    PE: aaox3, normal HR, pulmonary effort normal, abd soft, nt, nt      Cardiology Consult; Pt's chest pain likely noncardiac  EKG and troponin unremarkable  Recent nuclear stress test was unremarkable  Chest pain which is musculoskeletal and resolved now  Pain is reproducible on exam  No further workup needed  Plan: PPM interrogation  Cont home Toprol XL and Ezetimibe  Hold Lasix and lisinopril  Hold Pradaxa given plan for tooth extraction      ED Triage Vitals   Temperature Pulse Respirations Blood Pressure SpO2   10/27/22 1150 10/27/22 1150 10/27/22 1150 10/27/22 1150 10/27/22 1150   97 6 °F (36 4 °C) 79 18 (!) 169/102 99 %      Temp Source Heart Rate Source Patient Position - Orthostatic VS BP Location FiO2 (%)   10/27/22 1150 10/27/22 1150 10/27/22 1150 10/27/22 1150 --   Oral Monitor Lying Right arm       Pain Score       10/27/22 1800       No Pain          Wt Readings from Last 1 Encounters:   10/27/22 (!) 136 kg (300 lb 6 4 oz)     Additional Vital Signs:   Date/Time Temp Pulse Resp BP MAP (mmHg) SpO2 O2 Device Patient Position - Orthostatic VS   10/28/22 15:32:43 98 3 °F (36 8 °C) 72 20 117/64 82 97 % -- Lying   10/28/22 11:06:02 97 8 °F (36 6 °C) 70 -- 117/63 81 98 % -- --   10/28/22 07:45:46 97 6 °F (36 4 °C) 81 -- 116/58 77 95 % -- --   10/28/22 02:28:47 -- 60 -- 128/58 81 98 % -- --   10/27/22 23:30:44 -- 64 -- 123/64 84 98 % None (Room air) --   10/27/22 2200 -- 68 16 117/56 81 97 % None (Room air) --   10/27/22 2100 -- 68 16 134/63 91 92 % None (Room air) --   10/27/22 2000 -- 80 18 152/76 97 99 % None (Room air) Lying   10/27/22 1800 97 5 °F (36 4 °C) 69 20 161/71 102 98 % None (Room air) Lying   10/27/22 1500 -- 62 20 103/52 73 95 % None (Room air) Lying       Pertinent Labs/Diagnostic Test Results:   CTA dissection protocol chest abdomen pelvis w wo contrast   Final Result by Dominique Artis MD (10/27 1702)         1  No evidence for aortic aneurysm, dissection, or intramural hematoma  2   Stable pulmonary nodules as noted  3   Postoperative change lateral abdominal wall with questionable adjacent small midline ventral Mobley hernia involving small bowel without evidence for strangulation or obstruction  4   Additional findings as noted  Workstation performed: [id number]         XR chest 2 views   ED Interpretation by Gopi Walker PA-C (10/27 1326)   No focal consolidation      Final Result by Simi Soriano DO (10/27 1410)      No acute cardiopulmonary disease    Known left mediastinal mass not well seen on this exam                   Workstation performed: [id number]           10/27  EKG result: Sinus rhythm with 1st degree A-V block    EKG 2: Sinus rhythm with 1st degree A-V block with occasional Ventricular-paced rhythm    EKG 3:Demand pacemaker; interpretation is based on intrinsic rhythm  Sinus rhythm with 2nd degree A-V block (Mobitz I) with frequent ventricular-paced complexes    EKG 4: Ventricular-paced rhythm    Results from last 7 days   Lab Units 10/27/22  1805   SARS-COV-2  Positive*     Results from last 7 days   Lab Units 10/28/22  0347 10/27/22  1207   WBC Thousand/uL 7 57 6 86   HEMOGLOBIN g/dL 11 9* 12 2   HEMATOCRIT % 38 3 37 7   PLATELETS Thousands/uL 191 190   NEUTROS ABS Thousands/µL 5 03 4 84         Results from last 7 days   Lab Units 10/28/22  0450 10/27/22  1207   SODIUM mmol/L 137 135   POTASSIUM mmol/L 5 6* 5 8*   CHLORIDE mmol/L 109* 111*   CO2 mmol/L 21 18*   ANION GAP mmol/L 7 6   BUN mg/dL 45* 46*   CREATININE mg/dL 1 77* 2 07*   EGFR ml/min/1 73sq m 35 29   CALCIUM mg/dL 9 1 9 4   MAGNESIUM mg/dL 1 3*  --      Results from last 7 days   Lab Units 10/27/22  1207   AST U/L 20   ALT U/L 36   ALK PHOS U/L 53   TOTAL PROTEIN g/dL 6 6   ALBUMIN g/dL 3 2*   TOTAL BILIRUBIN mg/dL 0 54     Results from last 7 days   Lab Units 10/28/22  1102 10/28/22  0632 10/27/22  2343 10/27/22  2122 10/27/22  1940   POC GLUCOSE mg/dl 124 81 114 66 73     Results from last 7 days   Lab Units 10/28/22  0450 10/27/22  1207   GLUCOSE RANDOM mg/dL 108 157*           Results from last 7 days   Lab Units 10/27/22  1740 10/27/22  1414 10/27/22  1207   HS TNI 0HR ng/L  --   --  10   HS TNI 2HR ng/L  --  11  --    HSTNI D2 ng/L  --  1  --    HS TNI 4HR ng/L 10  --   --    HSTNI D4 ng/L 0  --   --            Results from last 7 days   Lab Units 10/27/22  1207   NT-PRO BNP pg/mL 181       Results from last 7 days   Lab Units 10/27/22  1805   INFLUENZA A PCR  Negative   INFLUENZA B PCR  Negative   RSV PCR Negative     ED Treatment:   Medication Administration from 10/27/2022 1143 to 10/27/2022 2312       Date/Time Order Dose Route Action     10/27/2022 1527 iohexol (OMNIPAQUE) 350 MG/ML injection (SINGLE-DOSE) 100 mL 100 mL Intravenous Given     10/27/2022 1945 colchicine (COLCRYS) tablet 0 6 mg 0 6 mg Oral Given     10/27/2022 1945 tamsulosin (FLOMAX) capsule 0 4 mg 0 4 mg Oral Given     10/27/2022 2142 heparin (porcine) subcutaneous injection 5,000 Units 5,000 Units Subcutaneous Given     10/27/2022 1944 multi-electrolyte (PLASMALYTE-A/ISOLYTE-S PH 7 4) IV solution 75 mL/hr Intravenous New Bag     10/27/2022 1950 insulin regular (HumuLIN R,NovoLIN R) injection 10 Units 10 Units Intravenous Given     10/27/2022 1944 dextrose 50 % IV solution 25 mL 25 mL Intravenous Given        Past Medical History:   Diagnosis Date   • Diabetes ([facility] )    • Essential tremor    • Hyperlipidemia    • Hypertension    • Obstructive sleep apnea    • Paroxysmal atrial fibrillation ([facility])    • Sick sinus syndrome ([facility])      Present on Admission:  **None**      Admitting Diagnosis: Chest pain [R07 9]  Atypical chest pain [R07 89]  DANY (acute kidney injury) ([facility] ) [N17 9]  Age/Sex: [de-identified] y o  male  Admission Orders:  SCD  48 Hr telemetry monitoring  Orthostatic BP    Scheduled Medications:  allopurinol, 100 mg, Oral, Daily  colchicine, 0 6 mg, Oral, BID  ezetimibe, 10 mg, Oral, Daily  heparin (porcine), 5,000 Units, Subcutaneous, Q8H [facility]  insulin lispro, 2-12 Units, Subcutaneous, TID AC  levothyroxine, 100 mcg, Oral, Early Morning  loratadine, 10 mg, Oral, Daily  metoprolol succinate, 50 mg, Oral, Daily  tamsulosin, 0 4 mg, Oral, Daily With Dinner      Continuous IV Infusions:  sodium chloride, 75 mL/hr, Intravenous, Continuous  multi-electrolyte (PLASMALYTE-A/ISOLYTE-S PH 7 4) IV solution  Rate: 75 mL/hr Dose: 75 mL/hr  Freq: Continuous Route: IV  Last Dose: Stopped (10/28/22 1222)  Start: 10/27/22 1921 End: 10/28/22 1048    PRN Meds:  albuterol, 2 puff, Inhalation, Q6H PRN        IP CONSULT TO CARDIOLOGY    Network Utilization Review Department  ATTENTION: Please call with any questions or concerns to [phone] and carefully listen to the prompts so that you are directed to the right person  All voicemails are confidential   Nat De Jesus all requests for admission clinical reviews, approved or denied determinations and any other requests to dedicated fax number below belonging to the campus where the patient is receiving treatment   List of dedicated fax numbers for the Facilities:  [address] DENIALS (Administrative/Medical Necessity) [phone]   [address] (Maternity/NICU/Pediatrics) [phone]   [address] [phone]   [facility] [address] [phone]   [id number] [place] [address] [facility] [phone]   [address] [facility] 134 815 [facility] [phone]

## 2022-10-29 PROBLEM — E87.5 HYPERKALEMIA: Status: RESOLVED | Noted: 2022-10-27 | Resolved: 2022-10-29

## 2022-10-29 LAB
ANION GAP SERPL CALCULATED.3IONS-SCNC: 4 MMOL/L (ref 4–13)
BUN SERPL-MCNC: 34 MG/DL (ref 5–25)
C DIFF TOX GENS STL QL NAA+PROBE: NEGATIVE
CALCIUM SERPL-MCNC: 9.1 MG/DL (ref 8.3–10.1)
CAMPYLOBACTER DNA SPEC NAA+PROBE: NORMAL
CHLORIDE SERPL-SCNC: 113 MMOL/L (ref 96–108)
CO2 SERPL-SCNC: 22 MMOL/L (ref 21–32)
CREAT SERPL-MCNC: 1.36 MG/DL (ref 0.6–1.3)
GFR SERPL CREATININE-BSD FRML MDRD: 48 ML/MIN/1.73SQ M
GLUCOSE SERPL-MCNC: 107 MG/DL (ref 65–140)
GLUCOSE SERPL-MCNC: 134 MG/DL (ref 65–140)
GLUCOSE SERPL-MCNC: 143 MG/DL (ref 65–140)
GLUCOSE SERPL-MCNC: 177 MG/DL (ref 65–140)
GLUCOSE SERPL-MCNC: 85 MG/DL (ref 65–140)
MAGNESIUM SERPL-MCNC: 1.7 MG/DL (ref 1.6–2.6)
POTASSIUM SERPL-SCNC: 4.8 MMOL/L (ref 3.5–5.3)
SALMONELLA DNA SPEC QL NAA+PROBE: NORMAL
SHIGA TOXIN STX GENE SPEC NAA+PROBE: NORMAL
SHIGELLA DNA SPEC QL NAA+PROBE: NORMAL
SODIUM SERPL-SCNC: 139 MMOL/L (ref 135–147)

## 2022-10-29 RX ORDER — DABIGATRAN ETEXILATE 150 MG/1
150 CAPSULE ORAL EVERY 12 HOURS SCHEDULED
Status: DISCONTINUED | OUTPATIENT
Start: 2022-10-29 | End: 2022-10-30 | Stop reason: HOSPADM

## 2022-10-29 RX ORDER — ACETAMINOPHEN 325 MG/1
650 TABLET ORAL EVERY 6 HOURS PRN
Status: DISCONTINUED | OUTPATIENT
Start: 2022-10-29 | End: 2022-10-30 | Stop reason: HOSPADM

## 2022-10-29 RX ADMIN — DABIGATRAN ETEXILATE MESYLATE 150 MG: 150 CAPSULE ORAL at 20:55

## 2022-10-29 RX ADMIN — TAMSULOSIN HYDROCHLORIDE 0.4 MG: 0.4 CAPSULE ORAL at 17:16

## 2022-10-29 RX ADMIN — HEPARIN SODIUM 5000 UNITS: 5000 INJECTION INTRAVENOUS; SUBCUTANEOUS at 05:33

## 2022-10-29 RX ADMIN — SODIUM CHLORIDE 75 ML/HR: 0.9 INJECTION, SOLUTION INTRAVENOUS at 00:51

## 2022-10-29 RX ADMIN — SODIUM CHLORIDE 75 ML/HR: 0.9 INJECTION, SOLUTION INTRAVENOUS at 13:55

## 2022-10-29 RX ADMIN — COLCHICINE 0.6 MG: 0.6 TABLET ORAL at 17:16

## 2022-10-29 RX ADMIN — EZETIMIBE 10 MG: 10 TABLET ORAL at 09:04

## 2022-10-29 RX ADMIN — METOPROLOL SUCCINATE 50 MG: 50 TABLET, EXTENDED RELEASE ORAL at 09:04

## 2022-10-29 RX ADMIN — LORATADINE 10 MG: 10 TABLET ORAL at 09:04

## 2022-10-29 RX ADMIN — COLCHICINE 0.6 MG: 0.6 TABLET ORAL at 09:04

## 2022-10-29 RX ADMIN — LEVOTHYROXINE SODIUM 100 MCG: 100 TABLET ORAL at 05:33

## 2022-10-29 RX ADMIN — ALLOPURINOL 100 MG: 100 TABLET ORAL at 09:04

## 2022-10-29 RX ADMIN — ACETAMINOPHEN 650 MG: 325 TABLET, FILM COATED ORAL at 09:03

## 2022-10-29 NOTE — PLAN OF CARE
Problem: MOBILITY - ADULT  Goal: Maintain or return to baseline ADL function  Description: INTERVENTIONS:  -  Assess patient's ability to carry out ADLs; assess patient's baseline for ADL function and identify physical deficits which impact ability to perform ADLs (bathing, care of mouth/teeth, toileting, grooming, dressing, etc )  - Assess/evaluate cause of self-care deficits   - Assess range of motion  - Assess patient's mobility; develop plan if impaired  - Assess patient's need for assistive devices and provide as appropriate  - Encourage maximum independence but intervene and supervise when necessary  - Involve family in performance of ADLs  - Assess for home care needs following discharge   - Consider OT consult to assist with ADL evaluation and planning for discharge  - Provide patient education as appropriate  Outcome: Progressing  Goal: Maintains/Returns to pre admission functional level  Description: INTERVENTIONS:  - Perform BMAT or MOVE assessment daily    - Set and communicate daily mobility goal to care team and patient/family/caregiver  - Collaborate with rehabilitation services on mobility goals if consulted  - Perform Range of Motion  times a day  - Reposition patient every  hours  - Dangle patient  times a day  - Stand patient  times a day  - Ambulate patient  times a day  - Out of bed to chair  times a day   - Out of bed for meals times a day  - Out of bed for toileting  - Record patient progress and toleration of activity level   Outcome: Progressing     Problem: Nutrition/Hydration-ADULT  Goal: Nutrient/Hydration intake appropriate for improving, restoring or maintaining nutritional needs  Description: Monitor and assess patient's nutrition/hydration status for malnutrition  Collaborate with interdisciplinary team and initiate plan and interventions as ordered  Monitor patient's weight and dietary intake as ordered or per policy   Utilize nutrition screening tool and intervene as necessary  Determine patient's food preferences and provide high-protein, high-caloric foods as appropriate       INTERVENTIONS:  - Monitor oral intake, urinary output, labs, and treatment plans  - Assess nutrition and hydration status and recommend course of action  - Evaluate amount of meals eaten  - Assist patient with eating if necessary   - Allow adequate time for meals  - Recommend/ encourage appropriate diets, oral nutritional supplements, and vitamin/mineral supplements  - Order, calculate, and assess calorie counts as needed  - Recommend, monitor, and adjust tube feedings and TPN/PPN based on assessed needs  - Assess need for intravenous fluids  - Provide specific nutrition/hydration education as appropriate  - Include patient/family/caregiver in decisions related to nutrition  Outcome: Progressing

## 2022-10-29 NOTE — PROGRESS NOTES
Cardiology Progress Note - Dorothy Redd [de-identified] y o  male MRN: [id number]    [facility] Encounter: [id number]    Hospital Problems:  Principal Problem:    Chest pain  Active Problems:    Lightheadedness    Diarrhea    Acute renal failure superimposed on chronic kidney disease (HCC)    Dysphagia    Sick sinus syndrome (HCC)    Paroxysmal atrial fibrillation (HCC)    BPH (benign prostatic hyperplasia)    Obesity    Hyperkalemia    Type 2 diabetes mellitus ([address] )    COVID-19      Assessment & Plan: This is [de-identified] year male with past medical history of hypertension, hyperlipidemia, atrial fibrillation, sick sinus syndrome status post pacemaker, BPH, type 2 diabetes, dysphagia who initially presents with chief complaint of chest pain and dizziness  Patient had episode of COVID-19 3 weeks ago and has been having diarrhea for last 3 weeks  Cardiology was consulted for further management of chest pain and pacemaker interrogation  Patient follows with cardiologist at [facility]         # chest pain- noncardiac in origin  Patient has recent stress test which was unremarkable  Troponin negative  EKG without ischemic changes      # dizziness- in setting of diarrhea for 3 weeks  # DANY on CKD  # diarrhea for 3 weeks  # dysphagia  # recent COVID-19 infection  # type 2 diabetes  # paroxysmal AFib, sick sinus syndrome status post [employer] pacemaker- on Pradaxa at home     Plan:     ? His chest pain is likely noncardiac  EKG and troponin unremarkable  Recent nuclear stress test was unremarkable  Chest pain which is musculoskeletal and resolved now  No further inpatient cardiac work-up recommended  ? Continue home Toprol XL and ezetimibe  ? Creatinine approaching PTA baseline, restart home lasix and lisinopril once renal function normalized - per primary team  ? Pradaxa -- restart per primary team    No further cardiology recommendations  We will sign off   Patient advised to follow-up with his cardiologist at [facility]          Subjective:   Patient seen and examined  No significant events overnight  His chest pain has resolved  He complaints of a headache  Objective:     Vitals: Blood pressure 116/56, pulse 84, temperature 97 9 °F (36 6 °C), resp  rate 18, height 5' 10" (1 778 m), weight (!) 136 kg (300 lb 6 4 oz), SpO2 97 %  , Body mass index is 43 1 kg/m² ,   Orthostatic Blood Pressures    Flowsheet Row Most Recent Value   Blood Pressure 116/56 filed at 10/29/2022 0732   Patient Position - Orthostatic VS Lying filed at 10/28/2022 1532            Intake/Output Summary (Last 24 hours) at 10/29/2022 0929  Last data filed at 10/29/2022 6297  Gross per 24 hour   Intake 1332 ml   Output 1200 ml   Net 132 ml         Physical Exam:    General: Abelardo Tate is a morbidly obese elderly male, in no acute distress, sitting comfortably  HEENT: moist mucous membranes, EOMI  Neck:  No JVD, supple, trachea midline  Cardiovascular: unremarkable S1/S2, regular rate and rhythm, no murmurs, rubs or gallops  Pulmonary: normal respiratory effort, CTAB  Abdomen: soft and nondistended  Extremities: No lower extremity edema  Warm and well perfused extremities    Neuro: no focal motor deficits, AAOx3 (person, place, time)  Psych: Normal mood and affect, cooperative      Medications:      Current Facility-Administered Medications:   •  acetaminophen (TYLENOL) tablet 650 mg, 650 mg, Oral, Q6H PRN, Syed Mares MD, 650 mg at 10/29/22 0903  •  albuterol (PROVENTIL HFA,VENTOLIN HFA) inhaler 2 puff, 2 puff, Inhalation, Q6H PRN, Loretta Parry MD  •  allopurinol (ZYLOPRIM) tablet 100 mg, 100 mg, Oral, Daily, Majo Genao MD, 100 mg at 10/29/22 0904  •  colchicine (COLCRYS) tablet 0 6 mg, 0 6 mg, Oral, BID, Majo Genao MD, 0 6 mg at 10/29/22 3930  •  ezetimibe (ZETIA) tablet 10 mg, 10 mg, Oral, Daily, Majo Genao MD, 10 mg at 10/29/22 0904  •  heparin (porcine) subcutaneous injection 5,000 Units, 5,000 Units, Subcutaneous, Q8H [address], 5,000 Units at 10/29/22 0533 **AND** [CANCELED] Platelet count, , , Once, Roula Connor MD  •  insulin lispro (HumaLOG) 100 units/mL subcutaneous injection 2-12 Units, 2-12 Units, Subcutaneous, TID AC, 2 Units at 10/28/22 1652 **AND** Fingerstick Glucose (POCT), , , TID AC, Majo Genao MD  •  levothyroxine tablet 100 mcg, 100 mcg, Oral, Early Morning, Majo Genao MD, 100 mcg at 10/29/22 0533  •  loratadine (CLARITIN) tablet 10 mg, 10 mg, Oral, Daily, Roula Connor MD, 10 mg at 10/29/22 8935  •  metoprolol succinate (TOPROL-XL) 24 hr tablet 50 mg, 50 mg, Oral, Daily, Majo Genao MD, 50 mg at 10/29/22 0561  •  sodium chloride 0 9 % infusion, 75 mL/hr, Intravenous, Continuous, Delia Wallace MD, Last Rate: 75 mL/hr at 10/29/22 0051, 75 mL/hr at 10/29/22 0051  •  tamsulosin (FLOMAX) capsule 0 4 mg, 0 4 mg, Oral, Daily With Humberto Garduno MD, 0 4 mg at 10/28/22 1652     Labs & Results:        Results from last 7 days   Lab Units 10/28/22  0347 10/27/22  1207   WBC Thousand/uL 7 57 6 86   HEMOGLOBIN g/dL 11 9* 12 2   HEMATOCRIT % 38 3 37 7   PLATELETS Thousands/uL 191 190         Results from last 7 days   Lab Units 10/29/22  0300 10/28/22  0450 10/27/22  1207   POTASSIUM mmol/L 4 8 5 6* 5 8*   CHLORIDE mmol/L 113* 109* 111*   CO2 mmol/L 22 21 18*   BUN mg/dL 34* 45* 46*   CREATININE mg/dL 1 36* 1 77* 2 07*   CALCIUM mg/dL 9 1 9 1 9 4   ALK PHOS U/L  --   --  53   ALT U/L  --   --  36   AST U/L  --   --  20         Results from last 7 days   Lab Units 10/29/22  0300 10/28/22  0450   MAGNESIUM mg/dL 1 7 1 3*          This note was completed in part utilizing M*Modal fluency direct voice recognition software   Grammatical errors, random word insertion, spelling mistakes, occasional wrong word or "sound-alike" substitutions and incomplete sentences may be an occasional consequence of the system secondary to software limitations, ambient noise and hardware issues  At the time of dictation, efforts were made to edit, clarify and /or correct errors  Please read the chart carefully and recognize, using context, where substitutions have occurred  If you have any questions or concerns about the context, text or information contained within the body of this dictation, please contact myself, the provider, for further clarification

## 2022-10-29 NOTE — ASSESSMENT & PLAN NOTE
· On metoprolol succinate 50 mg daily  · AC:  On Pradaxa which was on hold  for tooth extraction that's scheduled for 10/28/2022  · Patient confirmed that his procedure will not occur in the next few days, will resume

## 2022-10-29 NOTE — ASSESSMENT & PLAN NOTE
Lightheadedness for over a week, suspect orthostatic since it happens after he sits up and stands up   Near syncopal episode  · Possibly due to dehydration, patient was not eating and was having diarrhea  · Also requested Interrogatation via cardiology for completion  · Orthostatic vitals  · IV hydration

## 2022-10-29 NOTE — ASSESSMENT & PLAN NOTE
Lab Results   Component Value Date    EGFR 48 10/29/2022    EGFR 35 10/28/2022    EGFR 29 10/27/2022    CREATININE 1 36 (H) 10/29/2022    CREATININE 1 77 (H) 10/28/2022    CREATININE 2 07 (H) 10/27/2022     · Etiology: Poor po intake, COVID-19, diarrhea  · Baseline creatinine around 1 1  · Creatinine on admission 2 07  · Monitor creatinine in the next 24-48 hours, patient received contrast for CTA  · Bladder scan  · Patient is on Lasix 20 mg daily and lisinopril 10 mg daily, both are held  · Continue gentle hydration

## 2022-10-29 NOTE — PROGRESS NOTES
[address] [facility]  Progress Note - Santana Saliva 1940, [de-identified] y o  male MRN: [id number]  Unit/Bed#: [facility] 523-01 Encounter: [id number]  Primary Care Provider: Analy Bonilla MD   Date and time admitted to hospital: 10/27/2022 11:45 AM    Obesity  Assessment & Plan  · Lifestyle modification    BPH (benign prostatic hyperplasia)  Assessment & Plan  · Continue tamsulosin    Paroxysmal atrial fibrillation (HCC)  Assessment & Plan  · On metoprolol succinate 50 mg daily  · AC:  On Pradaxa which was on hold  for tooth extraction that's scheduled for 10/28/2022  · Patient confirmed that his procedure will not occur in the next few days, will resume     Sick sinus syndrome [facility])  Assessment & Plan  History of paroxysmal atrial fibrillation and sick sinus syndrome status post pacemaker  · Interrogate pacemaker    Dysphagia  Assessment & Plan  Patient has a feeling food gets stuck in his throat for the past week  · Contributes it to the pills that he got for COVID  · Speech following  · Continue dysphagia diet    Acute renal failure superimposed on chronic kidney disease [facility])  Assessment & Plan  Lab Results   Component Value Date    EGFR 48 10/29/2022    EGFR 35 10/28/2022    EGFR 29 10/27/2022    CREATININE 1 36 (H) 10/29/2022    CREATININE 1 77 (H) 10/28/2022    CREATININE 2 07 (H) 10/27/2022     · Etiology: Poor po intake, COVID-19, diarrhea  · Baseline creatinine around 1 1  · Creatinine on admission 2 07  · Monitor creatinine in the next 24-48 hours, patient received contrast for CTA  · Bladder scan  · Patient is on Lasix 20 mg daily and lisinopril 10 mg daily, both are held  · Continue gentle hydration      Diarrhea  Assessment & Plan  Per wife and patient diarrhea almost since COVID, 3 weeks ago  · Usually it's once a day but it can happen up to 3 times a day, possibly covid-19 related  · Check C diff and enteric panel for completeness--> normal     Lightheadedness  Assessment & Plan  Lightheadedness for over a week, suspect orthostatic since it happens after he sits up and stands up  Near syncopal episode  · Possibly due to dehydration, patient was not eating and was having diarrhea  · Also requested Interrogatation via cardiology for completion  · Orthostatic vitals  · IV hydration    * Chest pain  Assessment & Plan  Chest pain: Midsternal with radiation to his back and left arm  Improved with nitroglycerin  · He is following [facility], he was evaluated in September for midsternal chest pain  09/26/2022 he underwent pharmacological stress test which was negative  · CTA chest abdomen pelvis showed no evidence of aortic aneurysm, dissection or intramural hematoma  · Pain is not reproducible  · Telemetry  · Cardiology evaluation--->noncardiac in origin   Patient has recent stress test which was unremarkable   Troponin negative   EKG without ischemic changes  Results from last 7 days   Lab Units 10/27/22  1740 10/27/22  1414 10/27/22  1207   HS TNI 0HR ng/L  --   --  10   HS TNI 2HR ng/L  --  11  --    HS TNI 4HR ng/L 10  --   --          Hyperkalemia-resolved as of 10/29/2022  Assessment & Plan  · Improving, possibly DANY related  Plasmalyte discontinued    Recent Labs     10/27/22  1207 10/28/22  0450 10/29/22  0300   K 5 8* 5 6* 4 8   MG  --  1 3* 1 7               VTE Pharmacologic Prophylaxis: VTE Score: 5 Moderate Risk (Score 3-4) - Pharmacological DVT Prophylaxis Ordered: dabigatran (Pradaxa)  Patient Centered Rounds: yes    Discussions with Specialists or Other Care Team Provider: yes    Education and Discussions with Family / Patient: yes  Time Spent for Care: 30 minutes  More than 50% of total time spent on counseling and coordination of care as described above      Current Length of Stay: 1 day(s)  Current Patient Status: Inpatient   Certification Statement: The patient will continue to require additional inpatient hospital stay due to [facility]  Discharge Plan: Anticipate discharge tomorrow to home  Code Status: Level 1 - Full Code    Subjective:   Patient was seen this morning at bedside, no acute complaints, feeling better  Objective:     Vitals:   Temp (24hrs), Av 9 °F (36 6 °C), Min:97 6 °F (36 4 °C), Max:98 3 °F (36 8 °C)    Temp:  [97 6 °F (36 4 °C)-98 3 °F (36 8 °C)] 97 8 °F (36 6 °C)  HR:  [69-84] 71  Resp:  [17-20] 18  BP: (116-153)/(56-94) 153/94  SpO2:  [95 %-98 %] 96 %  Body mass index is 43 1 kg/m²  Input and Output Summary (last 24 hours): Intake/Output Summary (Last 24 hours) at 10/29/2022 1513  Last data filed at 10/29/2022 1353  Gross per 24 hour   Intake 1497 ml   Output 1500 ml   Net -3 ml       Physical Exam:   Physical Exam  Vitals reviewed  Constitutional:       General: He is not in acute distress  Appearance: He is obese  HENT:      Head: Normocephalic  Nose: Nose normal       Mouth/Throat:      Mouth: Mucous membranes are dry  Eyes:      General: No scleral icterus  Cardiovascular:      Rate and Rhythm: Normal rate  Heart sounds: Normal heart sounds  Pulmonary:      Breath sounds: Normal breath sounds  Abdominal:      General: There is no distension  Palpations: Abdomen is soft  Tenderness: There is no abdominal tenderness  There is no guarding  Musculoskeletal:      Right lower leg: No edema  Left lower leg: No edema  Skin:     General: Skin is warm  Neurological:      Mental Status: He is alert and oriented to person, place, and time     Psychiatric:         Mood and Affect: Mood normal        Additional Data:     Labs:  Results from last 7 days   Lab Units 10/28/22  0347   WBC Thousand/uL 7 57   HEMOGLOBIN g/dL 11 9*   HEMATOCRIT % 38 3   PLATELETS Thousands/uL 191   NEUTROS PCT % 66   LYMPHS PCT % 22   MONOS PCT % 9   EOS PCT % 2     Results from last 7 days   Lab Units 10/29/22  0300 10/28/22  0450 10/27/22  1207   SODIUM mmol/L 139   < > 135   POTASSIUM mmol/L 4 8   < > 5 8* CHLORIDE mmol/L 113*   < > 111*   CO2 mmol/L 22   < > 18*   BUN mg/dL 34*   < > 46*   CREATININE mg/dL 1 36*   < > 2 07*   ANION GAP mmol/L 4   < > 6   CALCIUM mg/dL 9 1   < > 9 4   ALBUMIN g/dL  --   --  3 2*   TOTAL BILIRUBIN mg/dL  --   --  0 54   ALK PHOS U/L  --   --  53   ALT U/L  --   --  36   AST U/L  --   --  20   GLUCOSE RANDOM mg/dL 107   < > 157*    < > = values in this interval not displayed  Results from last 7 days   Lab Units 10/29/22  1047 10/29/22  0620 10/28/22  2100 10/28/22  1611 10/28/22  1102 10/28/22  0632 10/27/22  2343 10/27/22  2122 10/27/22  1940   POC GLUCOSE mg/dl 134 85 138 164* 124 81 114 66 73               Lines/Drains:  Invasive Devices  Report    Peripheral Intravenous Line  Duration           Peripheral IV 10/27/22 Left Wrist 2 days                  Telemetry:  Telemetry Orders (From admission, onward)             48 Hour Telemetry Monitoring  Continuous x 48 hours        Expiring   References:    Telemetry Guidelines   Question:  Reason for 48 Hour Telemetry  Answer:  Acute MI, chest pain - R/O MI, or unstable angina                 Telemetry Reviewed: no acute events   Indication for Continued Telemetry Use: No indication for continued use  Will discontinue                Imaging: Reviewed radiology reports from this admission including: chest CT scan    Recent Cultures (last 7 days):   Results from last 7 days   Lab Units 10/28/22  1735   C DIFF TOXIN B BY PCR  Negative       Last 24 Hours Medication List:   Current Facility-Administered Medications   Medication Dose Route Frequency Provider Last Rate   • acetaminophen  650 mg Oral Q6H PRN Leigh Ann Bob MD     • albuterol  2 puff Inhalation Q6H PRN Uri Whiting MD     • allopurinol  100 mg Oral Daily Uri Whiting MD     • colchicine  0 6 mg Oral BID Uri Whiting MD     • dabigatran etexilate  150 mg Oral Q12H [facility] [facility], MD     • ezetimibe  10 mg Oral Daily Uri Whiting MD • insulin lispro  2-12 Units Subcutaneous TID AC Majo Genao MD     • levothyroxine  100 mcg Oral Early Morning Marlo Hewitt MD     • loratadine  10 mg Oral Daily Marlo Hewitt MD     • metoprolol succinate  50 mg Oral Daily Marlo Hewitt MD     • sodium chloride  75 mL/hr Intravenous Continuous Brena Space, MD 75 mL/hr (10/29/22 1355)   • tamsulosin  0 4 mg Oral Daily With Kelly Winter MD          Today, Patient Was Seen By: Rodney Contreras    **Please Note: This note may have been constructed using a voice recognition system  **

## 2022-10-29 NOTE — ASSESSMENT & PLAN NOTE
Chest pain: Midsternal with radiation to his back and left arm  Improved with nitroglycerin  · He is following [facility] Cardiology, he was evaluated in September for midsternal chest pain  09/26/2022 he underwent pharmacological stress test which was negative  · CTA chest abdomen pelvis showed no evidence of aortic aneurysm, dissection or intramural hematoma  · Pain is not reproducible  · Telemetry  · Cardiology evaluation--->noncardiac in origin   Patient has recent stress test which was unremarkable   Troponin negative   EKG without ischemic changes      Results from last 7 days   Lab Units 10/27/22  1740 10/27/22  1414 10/27/22  1207   HS TNI 0HR ng/L  --   --  10   HS TNI 2HR ng/L  --  11  --    HS TNI 4HR ng/L 10  --   --

## 2022-10-29 NOTE — ASSESSMENT & PLAN NOTE
Per wife and patient diarrhea almost since COVID, 3 weeks ago  · Usually it's once a day but it can happen up to 3 times a day, possibly covid-19 related  · Check C diff and enteric panel for completeness--> normal

## 2022-10-29 NOTE — ASSESSMENT & PLAN NOTE
Patient has a feeling food gets stuck in his throat for the past week  · Contributes it to the pills that he got for COVID  · Speech following  · Continue dysphagia diet

## 2022-10-29 NOTE — ASSESSMENT & PLAN NOTE
· Improving, possibly DANY related   Plasmalyte discontinued    Recent Labs     10/27/22  1207 10/28/22  0450 10/29/22  0300   K 5 8* 5 6* 4 8   MG  --  1 3* 1 7

## 2022-10-29 NOTE — UTILIZATION REVIEW
NOTIFICATION OF INPATIENT ADMISSION   AUTHORIZATION REQUEST   SERVICING FACILITY:   [facility]  Address: [address] [place] [address], [address] [place] [address]  Tax ID: [id number]  NPI: [id number] ATTENDING PROVIDER:  Attending Name and NPI#: Waltrock Zimmer, [place] [[id number]]  Address: [address] [place] [address], [address] [place] [address]  Phone: [phone]   ADMISSION INFORMATION:  Place of Service: Inpatient [address] [facility]  [address]  Place of Service Code: 21  Inpatient Admission Date/Time: 10/28/22 10:56 AM  Discharge Date/Time: No discharge date for patient encounter  Admitting Diagnosis Code/Description:  Chest pain [R07 9]  Atypical chest pain [R07 89]  D[facility]) ([facility] ) [N17 9]     UTILIZATION REVIEW CONTACT:  Clarissa Worley Utilization   Network Utilization Review Department  Phone: [phone]  Fax: [phone]  Email: [email or web address]  Contact for approvals/pending authorizations, clinical reviews, and discharge  PHYSICIAN ADVISORY SERVICES:  Medical Necessity Denial & Bslm-rb-Aguw Review  Phone: [phone]  Fax: [phone]  Email: [email or web address]

## 2022-10-29 NOTE — UTILIZATION REVIEW
Continued Stay Review    Date:  10/29                            Current Patient Class:  IP  Current Level of Care:  MS      HPI:82 y o  male initially admitted on   10/27  For  Workup of chest pain, lightheadedness/ near syncope  (suspect orthostatic since it happens when sitting up and/or standing up)    Troponin x2 negative, EKG V paced, no acute ischemic changes  MARY score 3  H/o COVID POS  3 wks prior - CTAP w/o pna,  Ongoing diarrhea since  H/o  PAF & SSS s/p pacemaker  Management of  DANY  (crt 2 07 - baseline 1 1-1 4)   Home lasix/lisinopril held, IVF given  10/29  CARDIOLOGY  -  Cp not likely cardiac  Perform pacemaker interrogation  Continue home Toprol XL and ezetimibe     Creatinine approaching PTA baseline, restart home lasix and lisinopril once renal function normalized - per primary team        Vital Signs:    10/29/22 11:46:54 98 1 °F (36 7 °C) 70 17 137/75 95 % --   10/29/22 07:32:57 97 9 °F (36 6 °C) -- 18 116/56 -- --   10/29/22 00:03:27 97 6 °F (36 4 °C) 84 18 129/85 97 % --   10/28/22 2121 -- -- -- -- 98 % None (Room air)   10/28/22 19:41:53 97 7 °F (36 5 °C) 69 18 123/64 98 % --   10/28/22 15:32:43 98 3 °F (36 8 °C) 72 20 117/64 97 % --     Pertinent Labs/Diagnostic Results:       Results from last 7 days   Lab Units 10/27/22  1805   SARS-COV-2  Positive*     Results from last 7 days   Lab Units 10/28/22  0347 10/27/22  1207   WBC Thousand/uL 7 57 6 86   HEMOGLOBIN g/dL 11 9* 12 2   HEMATOCRIT % 38 3 37 7   PLATELETS Thousands/uL 191 190   NEUTROS ABS Thousands/µL 5 03 4 84         Results from last 7 days   Lab Units 10/29/22  0300 10/28/22  0450 10/27/22  1207   SODIUM mmol/L 139 137 135   POTASSIUM mmol/L 4 8 5 6* 5 8*   CHLORIDE mmol/L 113* 109* 111*   CO2 mmol/L 22 21 18*   ANION GAP mmol/L 4 7 6   BUN mg/dL 34* 45* 46*   CREATININE mg/dL 1 36* 1 77* 2 07*   EGFR ml/min/1 73sq m 48 35 29   CALCIUM mg/dL 9 1 9 1 9 4   MAGNESIUM mg/dL 1 7 1 3*  --      Results from last 7 days   Lab Units 10/27/22  1207   AST U/L 20   ALT U/L 36   ALK PHOS U/L 53   TOTAL PROTEIN g/dL 6 6   ALBUMIN g/dL 3 2*   TOTAL BILIRUBIN mg/dL 0 54     Results from last 7 days   Lab Units 10/29/22  1047 10/29/22  0620 10/28/22  2100 10/28/22  1611 10/28/22  1102 10/28/22  0632 10/27/22  2343 10/27/22  2122 10/27/22  1940   POC GLUCOSE mg/dl 134 85 138 164* 124 81 114 66 73     Results from last 7 days   Lab Units 10/29/22  0300 10/28/22  0450 10/27/22  1207   GLUCOSE RANDOM mg/dL 107 108 157*       Results from last 7 days   Lab Units 10/27/22  1740 10/27/22  1414 10/27/22  1207   HS TNI 0HR ng/L  --   --  10   HS TNI 2HR ng/L  --  11  --    HSTNI D2 ng/L  --  1  --    HS TNI 4HR ng/L 10  --   --    HSTNI D4 ng/L 0  --   --        Results from last 7 days   Lab Units 10/27/22  1207   NT-PRO BNP pg/mL 181       Results from last 7 days   Lab Units 10/27/22  1805   INFLUENZA A PCR  Negative   INFLUENZA B PCR  Negative   RSV PCR  Negative            Results from last 7 days   Lab Units 10/28/22  1735   C DIFF TOXIN B BY PCR  Negative     Results from last 7 days   Lab Units 10/28/22  1735   SALMONELLA SP PCR  None Detected   SHIGELLA SP/ENTEROINVASIVE E  COLI (EIEC)  None Detected   CAMPYLOBACTER SP (JEJUNI AND COLI)  None Detected   SHIGA TOXIN 1/SHIGA TOXIN 2  None Detected       Medications:   Scheduled Medications:  allopurinol, 100 mg, Oral, Daily  colchicine, 0 6 mg, Oral, BID  ezetimibe, 10 mg, Oral, Daily  heparin (porcine), 5,000 Units, Subcutaneous, Q8H MELITA  insulin lispro, 2-12 Units, Subcutaneous, TID AC  levothyroxine, 100 mcg, Oral, Early Morning  loratadine, 10 mg, Oral, Daily  metoprolol succinate, 50 mg, Oral, Daily  tamsulosin, 0 4 mg, Oral, Daily With Dinner      Continuous IV Infusions:  sodium chloride, 75 mL/hr, Intravenous, Continuous      PRN Meds:  acetaminophen, 650 mg, Oral, Q6H PRN  albuterol, 2 puff, Inhalation, Q6H PRN        Discharge Plan:  tbd    Network Utilization Review Department  ATTENTION: Please call with any questions or concerns to [phone] and carefully listen to the prompts so that you are directed to the right person  All voicemails are confidential   Fernanda Templeton all requests for admission clinical reviews, approved or denied determinations and any other requests to dedicated fax number below belonging to the campus where the patient is receiving treatment   List of dedicated fax numbers for the Facilities:  [address] DENIALS (Administrative/Medical Necessity) [phone]   [address] (Maternity/NICU/Pediatrics) [phone]   [address] [phone]   [facility] [address] [phone]   [address] [address] [facility] [phone]   [facility] [place] [address] [facility] 134 815 [facility] [phone]

## 2022-10-30 VITALS
WEIGHT: 300.4 LBS | HEART RATE: 69 BPM | BODY MASS INDEX: 43.01 KG/M2 | DIASTOLIC BLOOD PRESSURE: 69 MMHG | HEIGHT: 70 IN | RESPIRATION RATE: 16 BRPM | OXYGEN SATURATION: 97 % | TEMPERATURE: 97.6 F | SYSTOLIC BLOOD PRESSURE: 133 MMHG

## 2022-10-30 PROBLEM — R07.9 CHEST PAIN: Status: RESOLVED | Noted: 2022-10-27 | Resolved: 2022-10-30

## 2022-10-30 PROBLEM — R19.7 DIARRHEA: Status: RESOLVED | Noted: 2022-10-27 | Resolved: 2022-10-30

## 2022-10-30 PROBLEM — N17.9 ACUTE RENAL FAILURE SUPERIMPOSED ON CHRONIC KIDNEY DISEASE (HCC): Status: RESOLVED | Noted: 2022-10-27 | Resolved: 2022-10-30

## 2022-10-30 PROBLEM — N18.9 ACUTE RENAL FAILURE SUPERIMPOSED ON CHRONIC KIDNEY DISEASE (HCC): Status: RESOLVED | Noted: 2022-10-27 | Resolved: 2022-10-30

## 2022-10-30 PROBLEM — R42 LIGHTHEADEDNESS: Status: RESOLVED | Noted: 2022-10-27 | Resolved: 2022-10-30

## 2022-10-30 LAB
ANION GAP SERPL CALCULATED.3IONS-SCNC: 5 MMOL/L (ref 4–13)
BUN SERPL-MCNC: 19 MG/DL (ref 5–25)
CALCIUM SERPL-MCNC: 8.6 MG/DL (ref 8.3–10.1)
CHLORIDE SERPL-SCNC: 117 MMOL/L (ref 96–108)
CO2 SERPL-SCNC: 19 MMOL/L (ref 21–32)
CREAT SERPL-MCNC: 1.12 MG/DL (ref 0.6–1.3)
GFR SERPL CREATININE-BSD FRML MDRD: 61 ML/MIN/1.73SQ M
GLUCOSE SERPL-MCNC: 111 MG/DL (ref 65–140)
GLUCOSE SERPL-MCNC: 159 MG/DL (ref 65–140)
GLUCOSE SERPL-MCNC: 99 MG/DL (ref 65–140)
POTASSIUM SERPL-SCNC: 5.1 MMOL/L (ref 3.5–5.3)
SODIUM SERPL-SCNC: 141 MMOL/L (ref 135–147)

## 2022-10-30 RX ORDER — METOPROLOL SUCCINATE 50 MG/1
50 TABLET, EXTENDED RELEASE ORAL DAILY
Refills: 0
Start: 2022-10-31

## 2022-10-30 RX ORDER — TAMSULOSIN HYDROCHLORIDE 0.4 MG/1
0.4 CAPSULE ORAL
Refills: 0
Start: 2022-10-30

## 2022-10-30 RX ORDER — DABIGATRAN ETEXILATE 150 MG/1
150 CAPSULE ORAL EVERY 12 HOURS SCHEDULED
Refills: 0
Start: 2022-10-30

## 2022-10-30 RX ADMIN — EZETIMIBE 10 MG: 10 TABLET ORAL at 08:31

## 2022-10-30 RX ADMIN — METOPROLOL SUCCINATE 50 MG: 50 TABLET, EXTENDED RELEASE ORAL at 08:31

## 2022-10-30 RX ADMIN — ALLOPURINOL 100 MG: 100 TABLET ORAL at 08:31

## 2022-10-30 RX ADMIN — INSULIN LISPRO 2 UNITS: 100 INJECTION, SOLUTION INTRAVENOUS; SUBCUTANEOUS at 11:45

## 2022-10-30 RX ADMIN — COLCHICINE 0.6 MG: 0.6 TABLET ORAL at 08:31

## 2022-10-30 RX ADMIN — LORATADINE 10 MG: 10 TABLET ORAL at 08:31

## 2022-10-30 RX ADMIN — LEVOTHYROXINE SODIUM 100 MCG: 100 TABLET ORAL at 05:40

## 2022-10-30 RX ADMIN — ACETAMINOPHEN 650 MG: 325 TABLET, FILM COATED ORAL at 08:35

## 2022-10-30 RX ADMIN — DABIGATRAN ETEXILATE MESYLATE 150 MG: 150 CAPSULE ORAL at 08:31

## 2022-10-30 NOTE — ASSESSMENT & PLAN NOTE
Lab Results   Component Value Date    HGBA1C 9 5 (H) 05/11/2022       Recent Labs     10/29/22  1543 10/29/22  2030 10/30/22  0656 10/30/22  1041   POCGLU 143* 177* 99 159*       Blood Sugar Average: Last 72 hrs:  · (P) [phone] [id number] patient is on Tresiba 50 units in the morning and 80 units in the evening  · Was on insulin sliding scale during hospitalization  · Diabetic diet, was not eating well   · Follow up with PCP regarding resumption of insulin regimen

## 2022-10-30 NOTE — NURSING NOTE
Patient given all discharge information  Patient states that his home nurse is coming over tonight and will go over information again  Patient packed all belongings

## 2022-10-30 NOTE — DISCHARGE SUMMARY
[facility]  Discharge- Candice Come 1940, [de-identified] y o  male MRN: [id number]  Unit/Bed#: [facility] 523-01 Encounter: [id number]  Primary Care Provider: Akash Badillo MD   Date and time admitted to hospital: 10/27/2022 11:45 AM    COVID-19  Assessment & Plan  Patient tested positive for COVID about 3 weeks ago, at that time he had sore throat, denies having any respiratory issues  He received treatment for COVID as outpatient  · CT chest without pneumonia  · Supportive care  On room air    Type 2 diabetes mellitus [facility])  Assessment & Plan  Lab Results   Component Value Date    HGBA1C 9 5 (H) 05/11/2022       Recent Labs     10/29/22  1543 10/29/22  2030 10/30/22  0656 10/30/22  1041   POCGLU 143* 177* 99 159*       Blood Sugar Average: Last 72 hrs:  · (P) 097 [id number] patient is on Tresiba 50 units in the morning and 80 units in the evening  · Was on insulin sliding scale during hospitalization  · Diabetic diet, was not eating well   · Follow up with PCP regarding resumption of insulin regimen     Obesity  Assessment & Plan  · Lifestyle modification    BPH (benign prostatic hyperplasia)  Assessment & Plan  · Continue tamsulosin    Paroxysmal atrial fibrillation (HCC)  Assessment & Plan  · On metoprolol succinate 50 mg daily  · AC:  On Pradaxa which was on hold  for tooth extraction that's scheduled for 10/28/2022  · Patient confirmed that his procedure will not occur in the next few days, will resume     Sick sinus syndrome [facility])  Assessment & Plan  History of paroxysmal atrial fibrillation and sick sinus syndrome status post pacemaker  · Interrogate pacemaker    Dysphagia  Assessment & Plan  Patient has a feeling food gets stuck in his throat for the past week  · Contributes it to the pills that he got for COVID  · Speech following  · Continue dysphagia diet    Diarrhea-resolved as of 10/30/2022  Assessment & Plan  Per wife and patient diarrhea almost since COVID, 3 weeks ago  · Usually it's once a day but it can happen up to 3 times a day, possibly covid-19 related  · Check C diff and enteric panel for completeness--> normal     Lightheadedness-resolved as of 10/30/2022  Assessment & Plan  Lightheadedness for over a week, suspect orthostatic since it happens after he sits up and stands up  Near syncopal episode  · Possibly due to dehydration, patient was not eating and was having diarrhea  · Also requested Interrogatation via cardiology for completion  · Orthostatic vitals  · IV hydration        Medical Problems             Resolved Problems  Date Reviewed: 10/30/2022          Resolved    Lightheadedness 10/30/2022     Resolved by  Emy Yun MD    * (Principal) Chest pain 10/30/2022     Resolved by  Emy Yun MD    Diarrhea 10/30/2022     Resolved by  Emy Yun MD    Acute renal failure superimposed on chronic kidney disease ([facility] [address] ) 10/30/2022     Resolved by  Emy Yun MD    Hyperkalemia 10/29/2022     Resolved by  Emy Yun MD              Discharging Physician / Practitioner: Kristie De León  PCP: Princess Villarreal MD  Admission Date:   Admission Orders (From admission, onward)     Ordered        10/28/22 1056  Inpatient Admission  Once            10/27/22 1711  Place in Observation  Once                      Discharge Date: 10/30/22    Consultations During Hospital Stay:  · Cardiology     Procedures Performed:   · None     Significant Findings / Test Results:   1  No evidence for aortic aneurysm, dissection, or intramural hematoma  2   Stable pulmonary nodules as noted  3   Postoperative change lateral abdominal wall with questionable adjacent small midline ventral Mobley hernia involving small bowel without evidence for strangulation or obstruction  4   Additional findings as noted        Incidental Findings:   No acute cardiopulmonary disease    Known left mediastinal mass not well seen on this exam      Test Results Pending at Discharge (will require follow up):   ·      Outpatient Tests Requested:  · BMP    Complications:  None     Reason for Admission: chest pain     Hospital Course:   H&P by Dr Denisha Cao:   "Karey Jacobs is a [de-identified] y o  male with a PMH of hypertension, hyperlipidemia, BPH, hypothyroidism, paroxysmal atrial fibrillation, sick sinus syndrome status post ppm, obstructive sleep apnea, CKD stage 3 who presents with chest pain and lightheadedness  Patient developed midsternal chest pain after taking a shower this morning which was radiating to his back, left arm and neck  Associated with left arm numbness  He called 911, he got a dose of nitro which alleviated the pain  After nitro his blood pressure dropped  He also reports lightheadedness for over a week  Diagnosed with COVID around 3 weeks ago  Poor p o  intake for over a week, complains of bad taste in his mouth which he got after he took pills for COVID  His blood sugar was in the 50s this morning  Report almost 3 weeks of watery diarrhea once a day but he can happen up to 3 times a day  Reports lightheadedness which most often happens after he has then stop  Report food getting stuck in his throat, contributed to the pills he was taking for COVID "  Cardiology was consulted, because EKG and troponin unremarkable, reproducible non cardiac chest pain and recent unremarkable nuclear stress test, no need for further work up  Pacemaker interrogation done by cardio  Lasix and lisinopril on hold for DANY  Patient received fluids , kidney function monitored  Pradaxa was resumed, patient confirmed no dental procedure  Patient was maintained on sliding scale during his stay with fair glucose control  He was evaluated by speech therapy and deemed low risk for aspiration  Has some issues with swallowing noted after COVID  He was recommended soft diet with aspiration precautions ( small bites/sips and alternating bites and sips), along with frequent oral care  Patient to repeat blood work to assess kidney function  He will need to follow up with PCP regarding resumption of insulin regimen  On the day of discharge was feeling better, tolerating soft diet  [facility] to follow later today         Please see above list of diagnoses and related plan for additional information  Condition at Discharge: stable    Discharge Day Visit / Exam:   Subjective:    Feeling better, anxious to go home  Vitals: Blood Pressure: 133/69 (10/30/22 1042)  Pulse: 69 (10/30/22 1042)  Temperature: 97 6 °F (36 4 °C) (10/30/22 1042)  Temp Source: Oral (10/29/22 2218)  Respirations: 16 (10/30/22 1042)  Height: 5' 10" (177 8 cm) (10/27/22 2330)  Weight - Scale: (!) 136 kg (300 lb 6 4 oz) (10/27/22 1851)  SpO2: 97 % (10/30/22 1042)  Exam:   Physical Exam  Vitals reviewed  Constitutional:       General: He is not in acute distress  Appearance: He is obese  HENT:      Head: Normocephalic  Nose: Nose normal    Eyes:      General: No scleral icterus  Cardiovascular:      Rate and Rhythm: Normal rate  Heart sounds: Normal heart sounds  Pulmonary:      Breath sounds: Normal breath sounds  Abdominal:      General: There is no distension  Palpations: Abdomen is soft  Tenderness: There is no abdominal tenderness  There is no guarding  Musculoskeletal:      Right lower leg: No edema  Left lower leg: No edema  Skin:     General: Skin is warm  Neurological:      Mental Status: He is alert  Mental status is at baseline  Psychiatric:         Mood and Affect: Mood normal              Discharge instructions/Information to patient and family:   See after visit summary for information provided to patient and family  Provisions for Follow-Up Care:  See after visit summary for information related to follow-up care and any pertinent home health orders         Disposition:   Home    Planned Readmission: no     Discharge Statement:  I spent 30 minutes discharging the patient  This time was spent on the day of discharge  I had direct contact with the patient on the day of discharge  Greater than 50% of the total time was spent examining patient, answering all patient questions, arranging and discussing plan of care with patient as well as directly providing post-discharge instructions  Additional time then spent on discharge activities  Discharge Medications:  See after visit summary for reconciled discharge medications provided to patient and/or family        **Please Note: This note may have been constructed using a voice recognition system**

## 2022-10-30 NOTE — CASE MANAGEMENT
Case Management Discharge Planning Note    Patient name Raven Montano  [facility] [id number]  : 1940 Date 10/30/2022       Current Admission Date: 10/27/2022  Current Admission Diagnosis:Chest pain   Patient Active Problem List    Diagnosis Date Noted   • Lightheadedness 10/27/2022   • Chest pain 10/27/2022   • Diarrhea 10/27/2022   • Acute renal failure superimposed on chronic kidney disease ([facility] [address] ) 10/27/2022   • Dysphagia 10/27/2022   • Sick sinus syndrome ([facility] [address] ) 10/27/2022   • [facility] 10/27/2022   • BPH (benign prostatic hyperplasia) 10/27/2022   • Obesity 10/27/2022   • Type 2 diabetes mellitus ([facility] [address] ) 10/27/2022   • COVID-19 10/27/2022      LOS (days): 2  Geometric Mean LOS (GMLOS) (days): 3 60  Days to GMLOS:1 5     OBJECTIVE:  Risk of Unplanned Readmission Score: 12 84         Current admission status: Inpatient   Preferred Pharmacy: No Pharmacies Listed  Primary Care Provider: Eitan Delarosa MD    Primary Insurance: [address] [place] [address]  Secondary Insurance:     DISCHARGE DETAILS:    Discharge planning discussed with[de-identified] Patient  Freedom of Choice: Yes    Were Treatment Team discharge recommendations reviewed with patient/caregiver?: Yes  Did patient/caregiver verbalize understanding of patient care needs?: Yes  Were patient/caregiver advised of the risks associated with not following Treatment Team discharge recommendations?: Yes    Other Referral/Resources/Interventions Provided:  Referral Comments: Per communication with [facility], pt is cleared for d/c home today  Pt has no therapy or DME needs for d/c at this time  Recommendation for nurse visit for general check in post d/c and blood work  CM spoke with [facility] on call nurse Remington Gavin: [phone], in order to inform of same  CM was informed a [facility] nurse will follow up with patient post d/c per protocal and will assist with blood work   AVS faxed to [facility] as requested: [phone] and via communication mgmt  Pt reports his friend will provide transportation home  No further CM needs reported at this time       Treatment Team Recommendation: Home  Discharge Destination Plan[de-identified] Home (home with resumtpion of care with [facility] )  Transport at Discharge : Automobile, Family

## 2022-10-31 NOTE — UTILIZATION REVIEW
NOTIFICATION OF ADMISSION DISCHARGE   This is a Notification of Discharge from [address]  Please be advised that this patient has been discharge from our facility  Below you will find the admission and discharge date and time including the patient’s disposition  UTILIZATION REVIEW CONTACT:  Francie Sotelo  Utilization   Network Utilization Review Department  Phone: [phone] x carefully listen to the prompts  All voicemails are confidential   Email: [email or web address]     ADMISSION INFORMATION  PRESENTATION DATE: 10/27/2022 11:45 AM  OBERVATION ADMISSION DATE:   INPATIENT ADMISSION DATE: 10/28/22 10:56 AM   DISCHARGE DATE: 10/30/2022  1:07 PM  DISPOSITION: Home with [facility] with [address] INFORMATION:  Send all requests for admission clinical reviews, approved or denied determinations and any other requests to dedicated fax number below belonging to the campus where the patient is receiving treatment   List of dedicated fax numbers:  [address] DENIALS (Administrative/Medical Necessity) [phone]   [address] (Maternity/NICU/Pediatrics) [phone]   [facility] [phone]   [place] [address] [phone]   [address] [phone]   220 [facility] [phone]   90 [facility] [phone]   [facility] 119 [phone]   [facility]  [phone]   40[facility] [phone] 412 [facility] 850 [facility] [phone]

## 2022-11-28 ENCOUNTER — OFFICE VISIT (OUTPATIENT)
Dept: GASTROENTEROLOGY | Facility: CLINIC | Age: 82
End: 2022-11-28

## 2022-11-28 VITALS
DIASTOLIC BLOOD PRESSURE: 61 MMHG | BODY MASS INDEX: 42.56 KG/M2 | HEART RATE: 88 BPM | WEIGHT: 304 LBS | SYSTOLIC BLOOD PRESSURE: 96 MMHG | TEMPERATURE: 98.6 F | HEIGHT: 71 IN

## 2022-11-28 DIAGNOSIS — E66.01 MORBID OBESITY (HCC): ICD-10-CM

## 2022-11-28 DIAGNOSIS — R10.32 LEFT LOWER QUADRANT PAIN: Primary | ICD-10-CM

## 2022-11-28 DIAGNOSIS — K59.00 CONSTIPATION, UNSPECIFIED CONSTIPATION TYPE: ICD-10-CM

## 2022-11-28 RX ORDER — POLYETHYLENE GLYCOL 3350 17 G/17G
17 POWDER, FOR SOLUTION ORAL DAILY
Qty: 1700 G | Refills: 3 | Status: SHIPPED | OUTPATIENT
Start: 2022-11-28

## 2022-11-28 NOTE — PROGRESS NOTES
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  Will perform SBE to make sure there is no stricture  If CT enterography is normal, can consider colonoscopy  - CT small bowel enterography; Future    2  Constipation, unspecified constipation type  Could be contributing to LLQ pain  Will provide bowel regimen to help him to go to the bathroom  - polyethylene glycol (MIRALAX) 17 g packet; Take 17 g by mouth daily  Dispense: 1700 g; Refill: 3  - bisacodyl (DULCOLAX) 5 mg EC tablet; Take 1 tablet (5 mg total) by mouth daily as needed for constipation  Dispense: 60 tablet; Refill: 5  - CT small bowel enterography; Future    3  Morbid obesity ([address] )  Encouraged increased activity as much as possible 2/2 to neuropathy  The rationale for endoscopy with possible biopsy, possible polypectomy, with IV sedation as well as all risks, benefits, and alternatives were discussed with the patient in detail  Risks including but not limited to perforation, bleeding, need for blood transfusion or emergent surgery, and missed neoplasm were reviewed in detail with the patient  The patient demonstrates full understanding and wishes to proceed  ______________________________________________________________    HPI:  Asif Vance is a [de-identified]y o  year old male who presents to the office for evaluation of left lower quadrant pain  Patient has history of lumbar radiculopathy, atypical chest pain, lung nodules  He was recently admitted with chest pain/lightheadedness at [facility] in the end of October  Nitroglycerin relieved his pain but he did have blood pressure drop  (40/17)  Prior to admission he was diagnosed with COVID infection and had been taking medications    He had complained of dysphagia with these medications  He has a history of pacemaker  This was also evaluated by Cardiology while he was inpatient  He is here for evaluation of LLQ abdominal pain  Approximately 10 years ago he had incarceration of his small bowel and presented as bowel obstruction  Appears he just has his bowels 'untwisted' and he did fine afterwards  He is having pain in the same area  He had a CTA on 10/27/2022 and this revealed postoperative change in the ventral abdominal wall c/w previous hernia repair  He had a colonoscopy 10-12 years ago  He used to have a colonoscopy every year  He does have history of chronic diarrhea with alternating constipation but now he is having predominately constipation  He has been eating soft foods as he had a recent dental surgery for tooth extractio   REVIEW OF SYSTEMS:    CONSTITUTIONAL: Denies any fever, chills, rigors, and weight loss  HEENT: No earache or tinnitus  Denies hearing loss or visual disturbances  CARDIOVASCULAR: No chest pain or palpitations  RESPIRATORY: Denies any cough, hemoptysis, shortness of breath or dyspnea on exertion  GASTROINTESTINAL: As noted in the History of Present Illness  GENITOURINARY: No problems with urination  Denies any hematuria or dysuria  NEUROLOGIC: No dizziness or vertigo, denies headaches  MUSCULOSKELETAL: Denies any muscle or joint pain  SKIN: Denies skin rashes or itching  ENDOCRINE: Denies excessive thirst  Denies intolerance to heat or cold  PSYCHOSOCIAL: Denies depression or anxiety  Denies any recent memory loss         Historical Information   Past Medical History:   Diagnosis Date   • Diabetes [facility])    • Essential tremor    • Hyperlipidemia    • Hypertension    • Obstructive sleep apnea    • Paroxysmal atrial fibrillation [facility])    • Sick sinus syndrome [facility])      Past Surgical History:   Procedure Laterality Date   • CT NEEDLE BIOPSY LUNG  2/4/2021     Social History   Social History     Substance and Sexual Activity   Alcohol Use None     Social History     Substance and Sexual Activity   Drug Use Not on file     Social History     Tobacco Use   Smoking Status Former   Smokeless Tobacco Never     History reviewed  No pertinent family history  Meds/Allergies       Current Outpatient Medications:   •  acetaminophen (TYLENOL) 500 mg tablet  •  albuterol (PROVENTIL HFA,VENTOLIN HFA) 90 mcg/act inhaler  •  allopurinol (ZYLOPRIM) 100 mg tablet  •  Ascorbic Acid (VITAMIN C PO)  •  CHOLECALCIFEROL PO  •  colchicine (COLCRYS) 0 6 mg tablet  •  cyanocobalamin (VITAMIN B-12) 1000 MCG tablet  •  dabigatran etexilate (PRADAXA) 150 mg capsu  •  ezetimibe (ZETIA) 10 mg tablet  •  FERROUS FUMARATE PO  •  furosemide (LASIX) 20 mg tablet  •  levothyroxine 112 mcg tablet  •  lisinopril (ZESTRIL) 5 mg tablet  •  loratadine (CLARITIN) 10 mg tablet  •  metoprolol succinate (TOPROL-XL) 50 mg 24 hr tablet  •  tamsulosin (FLOMAX) 0 4 mg  •  insulin degludec (TRESIBA) 100 units/mL injection pen    Allergies   Allergen Reactions   • Aspirin GI Bleeding and Other (See Comments)   • Sitagliptin Hives and Rash   • Canagliflozin Rash and Hives     Burning with urination and kidneys started to fail           Objective     Blood pressure 96/61, pulse 88, temperature 98 6 °F (37 °C), temperature source Tympanic, height 5' 10 5" (1 791 m), weight (!) 138 kg (304 lb)  Body mass index is 43 kg/m²  PHYSICAL EXAM:      General Appearance:   Alert, cooperative, no distress   HEENT:   Normocephalic, atraumatic, anicteric           Lungs:   Equal chest rise and unlabored breathing, normal cough   Heart:   No visualized JVD   Abdomen:   Soft, non-tender, non-distended; no masses, no organomegaly    Genitalia:   Deferred    Rectal:   Deferred    Extremities:  No cyanosis, clubbing or edema    Pulses:  Musculoskeletal:  2+ and symmetric  Normal range of motion visualized    Skin:  Neuro:  No jaundice, rashes, or lesions   Alert and appropriate       Lab Results:   No visits with results within 1 Day(s) from this visit     Latest known visit with results is:   Admission on 10/27/2022, Discharged on 10/30/2022   Component Date Value   • Ventricular Rate 10/27/2022 72    • Atrial Rate 10/27/2022 500    • CT Interval 10/27/2022 384    • QRSD Interval 10/27/2022 88    • QT Interval 10/27/2022 352    • QTC Interval 10/27/2022 385    • QRS Axis 10/27/2022 55    • T Wave Axis 10/27/2022 62    • WBC 10/27/2022 6 86    • RBC 10/27/2022 4 15    • Hemoglobin 10/27/2022 12 2    • Hematocrit 10/27/2022 37 7    • MCV 10/27/2022 91    • MCH 10/27/2022 29 4    • MCHC 10/27/2022 32 4    • RDW 10/27/2022 13 6    • MPV 10/27/2022 11 2    • Platelets 17/74/2554 190    • nRBC 10/27/2022 0    • Neutrophils Relative 10/27/2022 71    • Immat GRANS % 10/27/2022 0    • Lymphocytes Relative 10/27/2022 19    • Monocytes Relative 10/27/2022 8    • Eosinophils Relative 10/27/2022 2    • Basophils Relative 10/27/2022 0    • Neutrophils Absolute 10/27/2022 4 84    • Immature Grans Absolute 10/27/2022 0 03    • Lymphocytes Absolute 10/27/2022 1 28    • Monocytes Absolute 10/27/2022 0 55    • Eosinophils Absolute 10/27/2022 0 13    • Basophils Absolute 10/27/2022 0 03    • Sodium 10/27/2022 135    • Potassium 10/27/2022 5 8 (H)    • Chloride 10/27/2022 111 (H)    • CO2 10/27/2022 18 (L)    • ANION GAP 10/27/2022 6    • BUN 10/27/2022 46 (H)    • Creatinine 10/27/2022 2 07 (H)    • Glucose 10/27/2022 157 (H)    • Calcium 10/27/2022 9 4    • Corrected Calcium 10/27/2022 10 0    • AST 10/27/2022 20    • ALT 10/27/2022 36    • Alkaline Phosphatase 10/27/2022 53    • Total Protein 10/27/2022 6 6    • Albumin 10/27/2022 3 2 (L)    • Total Bilirubin 10/27/2022 0 54    • eGFR 10/27/2022 29    • hs TnI 0hr 10/27/2022 10    • NT-proBNP 10/27/2022 181    • Ventricular Rate 10/27/2022 61    • Atrial Rate 10/27/2022 202    • QRSD Interval 10/27/2022 88    • QT Interval 10/27/2022 358    • QTC Interval 10/27/2022 360    • QRS Axis 10/27/2022 56    • T Wave Axis 10/27/2022 65    • hs TnI 2hr 10/27/2022 11    • Delta 2hr hsTnI 10/27/2022 1    • hs TnI 4hr 10/27/2022 10    • Delta 4hr hsTnI 10/27/2022 0    • Ventricular Rate 10/27/2022 56    • QRSD Interval 10/27/2022 78    • QT Interval 10/27/2022 424    • QTC Interval 10/27/2022 409    • QRS Axis 10/27/2022 71    • T Wave Axis 10/27/2022 75    • Ventricular Rate 10/27/2022 78    • Atrial Rate 10/27/2022 394    • QRSD Interval 10/27/2022 68    • QT Interval 10/27/2022 216    • QTC Interval 10/27/2022 246    • QRS Axis 10/27/2022 0    • T Wave Axis 10/27/2022 270    • SARS-CoV-2 10/27/2022 Positive (A)    • INFLUENZA A PCR 10/27/2022 Negative    • INFLUENZA B PCR 10/27/2022 Negative    • RSV PCR 10/27/2022 Negative    • C difficile toxin by PCR 10/28/2022 Negative    • Salmonella sp PCR 10/28/2022 None Detected    • Shigella sp/Enteroinvasi* 10/28/2022 None Detected    • Campylobacter sp (jejuni* 10/28/2022 None Detected    • Shiga toxin 1/Shiga toxi* 10/28/2022 None Detected    • POC Glucose 10/27/2022 73    • POC Glucose 10/27/2022 66    • POC Glucose 10/27/2022 114    • Sodium 10/28/2022 137    • Potassium 10/28/2022 5 6 (H)    • Chloride 10/28/2022 109 (H)    • CO2 10/28/2022 21    • ANION GAP 10/28/2022 7    • BUN 10/28/2022 45 (H)    • Creatinine 10/28/2022 1 77 (H)    • Glucose 10/28/2022 108    • Calcium 10/28/2022 9 1    • eGFR 10/28/2022 35    • WBC 10/28/2022 7 57    • RBC 10/28/2022 4 17    • Hemoglobin 10/28/2022 11 9 (L)    • Hematocrit 10/28/2022 38 3    • MCV 10/28/2022 92    • MCH 10/28/2022 28 5    • MCHC 10/28/2022 31 1 (L)    • RDW 10/28/2022 13 6    • MPV 10/28/2022 11 1    • Platelets 01/08/8526 191    • nRBC 10/28/2022 0    • Neutrophils Relative 10/28/2022 66    • Immat GRANS % 10/28/2022 0    • Lymphocytes Relative 10/28/2022 22    • Monocytes Relative 10/28/2022 9    • Eosinophils Relative 10/28/2022 2    • Basophils Relative 10/28/2022 1    • Neutrophils Absolute 10/28/2022 5 03    • Immature Grans Absolute 10/28/2022 0 03    • Lymphocytes Absolute 10/28/2022 1 65    • Monocytes Absolute 10/28/2022 0 67    • Eosinophils Absolute 10/28/2022 0 14    • Basophils Absolute 10/28/2022 0 05    • Magnesium 10/28/2022 1 3 (L)    • POC Glucose 10/28/2022 81    • POC Glucose 10/28/2022 124    • POC Glucose 10/28/2022 164 (H)    • POC Glucose 10/28/2022 138    • Sodium 10/29/2022 139    • Potassium 10/29/2022 4 8    • Chloride 10/29/2022 113 (H)    • CO2 10/29/2022 22    • ANION GAP 10/29/2022 4    • BUN 10/29/2022 34 (H)    • Creatinine 10/29/2022 1 36 (H)    • Glucose 10/29/2022 107    • Calcium 10/29/2022 9 1    • eGFR 10/29/2022 48    • Magnesium 10/29/2022 1 7    • POC Glucose 10/29/2022 85    • POC Glucose 10/29/2022 134    • POC Glucose 10/29/2022 143 (H)    • POC Glucose 10/29/2022 177 (H)    • Sodium 10/30/2022 141    • Potassium 10/30/2022 5 1    • Chloride 10/30/2022 117 (H)    • CO2 10/30/2022 19 (L)    • ANION GAP 10/30/2022 5    • BUN 10/30/2022 19    • Creatinine 10/30/2022 1 12    • Glucose 10/30/2022 111    • Calcium 10/30/2022 8 6    • eGFR 10/30/2022 61    • POC Glucose 10/30/2022 99    • POC Glucose 10/30/2022 159 (H)          Radiology Results:   No results found

## 2022-12-12 ENCOUNTER — HOSPITAL ENCOUNTER (OUTPATIENT)
Dept: RADIOLOGY | Facility: HOSPITAL | Age: 82
Discharge: HOME | End: 2022-12-12
Attending: INTERNAL MEDICINE

## 2022-12-12 DIAGNOSIS — K59.00 CONSTIPATION, UNSPECIFIED CONSTIPATION TYPE: ICD-10-CM

## 2022-12-12 DIAGNOSIS — R10.32 LEFT LOWER QUADRANT PAIN: ICD-10-CM

## 2022-12-12 RX ADMIN — IOHEXOL 100 ML: 350 INJECTION, SOLUTION INTRAVENOUS at 15:14

## 2023-01-12 ENCOUNTER — OFFICE VISIT (OUTPATIENT)
Dept: GASTROENTEROLOGY | Facility: CLINIC | Age: 83
End: 2023-01-12

## 2023-01-12 VITALS
WEIGHT: 308 LBS | DIASTOLIC BLOOD PRESSURE: 70 MMHG | SYSTOLIC BLOOD PRESSURE: 120 MMHG | HEIGHT: 70 IN | TEMPERATURE: 97.1 F | BODY MASS INDEX: 44.09 KG/M2

## 2023-01-12 DIAGNOSIS — E66.01 MORBID OBESITY (HCC): ICD-10-CM

## 2023-01-12 DIAGNOSIS — K76.9 LIVER LESION, LEFT LOBE: Primary | ICD-10-CM

## 2023-01-12 DIAGNOSIS — R10.32 LEFT LOWER QUADRANT PAIN: ICD-10-CM

## 2023-01-12 NOTE — PROGRESS NOTES
[facility] - Outpatient Follow-up Note  Ric Irwin [de-identified] y o  male MRN: [id number]  Encounter: [id number]          ASSESSMENT AND PLAN:      1  Liver lesion, left lobe  Patient has liver lesion that has increased in size  Will evaluate further with right quad phase study   - MRI abdomen w wo contrast; Future    2  Morbid obesity ([address] )  Encouraged activity and dietary changes  3  Left lower quadrant pain  Labile at this time  Will defer colonoscopy until we do MRI for liver lesion  ______________________________________________________________________    SUBJECTIVE: Patient here for follow-up of left lower quadrant pain, constipation  Patient was last seen in the office in November  We recommended CT small bowel enterography to evaluate for small bowel strictures  He had this done and there was nothing concerning in the small bowel but he did have a liver lesion that was enlargin gwith multiple pulmonary nodules  I have personally viewed the images in PACS  He is following with his PCP  Symptoms are currently stable  He is having more regular bowel movements  He does have alternating loose/semi solid movements  His constpaiton is overall better  Still has the LLQ pain  This hasnt improved or worsened since last visit  Sometimes it can flare up and become very bothersome  3 3 x 2 5 cm liver lesion in the left hepatic lobe  This was previously 2 8 x 2 3 on recent CT done 2 months earlier  REVIEW OF SYSTEMS IS OTHERWISE NEGATIVE        Historical Information   Past Medical History:   Diagnosis Date   • Diabetes [facility])    • Essential tremor    • Hyperlipidemia    • Hypertension    • Obstructive sleep apnea    • Paroxysmal atrial fibrillation [facility])    • Sick sinus syndrome [facility])      Past Surgical History:   Procedure Laterality Date   • CT NEEDLE BIOPSY LUNG  2/4/2021     Social History   Social History     Substance and Sexual Activity   Alcohol Use None     Social History     Substance and Sexual Activity   Drug Use Not on file     Social History     Tobacco Use   Smoking Status Former   Smokeless Tobacco Never     History reviewed  No pertinent family history  Meds/Allergies       Current Outpatient Medications:   •  acetaminophen (TYLENOL) 500 mg tablet  •  albuterol (PROVENTIL HFA,VENTOLIN HFA) 90 mcg/act inhaler  •  allopurinol (ZYLOPRIM) 100 mg tablet  •  Ascorbic Acid (VITAMIN C PO)  •  bisacodyl (DULCOLAX) 5 mg EC tablet  •  CHOLECALCIFEROL PO  •  colchicine (COLCRYS) 0 6 mg tablet  •  cyanocobalamin (VITAMIN B-12) 1000 MCG tablet  •  dabigatran etexilate (PRADAXA) 150 mg capsu  •  ezetimibe (ZETIA) 10 mg tablet  •  FERROUS FUMARATE PO  •  furosemide (LASIX) 20 mg tablet  •  insulin degludec (TRESIBA) 100 units/mL injection pen  •  levothyroxine 112 mcg tablet  •  lisinopril (ZESTRIL) 5 mg tablet  •  loratadine (CLARITIN) 10 mg tablet  •  metoprolol succinate (TOPROL-XL) 50 mg 24 hr tablet  •  polyethylene glycol (MIRALAX) 17 g packet  •  tamsulosin (FLOMAX) 0 4 mg    Allergies   Allergen Reactions   • Aspirin GI Bleeding and Other (See Comments)   • Sitagliptin Hives and Rash   • Canagliflozin Rash and Hives     Burning with urination and kidneys started to fail           Objective     Blood pressure 120/70, temperature (!) 97 1 °F (36 2 °C), temperature source Tympanic, height 5' 10" (1 778 m), weight (!) 140 kg (308 lb)  Body mass index is 44 19 kg/m²  PHYSICAL EXAM:      General Appearance:   Alert, cooperative, no distress   HEENT:   Normocephalic, atraumatic, anicteric           Lungs:   Equal chest rise and unlabored breathing, normal cough   Heart:   No visualized JVD   Abdomen:   Soft, non-tender, non-distended; no masses, no organomegaly    Genitalia:   Deferred    Rectal:   Deferred    Extremities:  No cyanosis, clubbing or edema    Pulses:  Musculoskeletal:  2+ and symmetric  Normal range of motion visualized    Skin:  Neuro:  No jaundice, rashes, or lesions   Alert and appropriate           Lab Results:   No visits with results within 1 Day(s) from this visit     Latest known visit with results is:   Admission on 10/27/2022, Discharged on 10/30/2022   Component Date Value   • Ventricular Rate 10/27/2022 72    • Atrial Rate 10/27/2022 500    • RI Interval 10/27/2022 384    • QRSD Interval 10/27/2022 88    • QT Interval 10/27/2022 352    • QTC Interval 10/27/2022 385    • QRS Axis 10/27/2022 55    • T Wave Axis 10/27/2022 62    • WBC 10/27/2022 6 86    • RBC 10/27/2022 4 15    • Hemoglobin 10/27/2022 12 2    • Hematocrit 10/27/2022 37 7    • MCV 10/27/2022 91    • MCH 10/27/2022 29 4    • MCHC 10/27/2022 32 4    • RDW 10/27/2022 13 6    • MPV 10/27/2022 11 2    • Platelets 81/49/9628 190    • nRBC 10/27/2022 0    • Neutrophils Relative 10/27/2022 71    • Immat GRANS % 10/27/2022 0    • Lymphocytes Relative 10/27/2022 19    • Monocytes Relative 10/27/2022 8    • Eosinophils Relative 10/27/2022 2    • Basophils Relative 10/27/2022 0    • Neutrophils Absolute 10/27/2022 4 84    • Immature Grans Absolute 10/27/2022 0 03    • Lymphocytes Absolute 10/27/2022 1 28    • Monocytes Absolute 10/27/2022 0 55    • Eosinophils Absolute 10/27/2022 0 13    • Basophils Absolute 10/27/2022 0 03    • Sodium 10/27/2022 135    • Potassium 10/27/2022 5 8 (H)    • Chloride 10/27/2022 111 (H)    • CO2 10/27/2022 18 (L)    • ANION GAP 10/27/2022 6    • BUN 10/27/2022 46 (H)    • Creatinine 10/27/2022 2 07 (H)    • Glucose 10/27/2022 157 (H)    • Calcium 10/27/2022 9 4    • Corrected Calcium 10/27/2022 10 0    • AST 10/27/2022 20    • ALT 10/27/2022 36    • Alkaline Phosphatase 10/27/2022 53    • Total Protein 10/27/2022 6 6    • Albumin 10/27/2022 3 2 (L)    • Total Bilirubin 10/27/2022 0 54    • eGFR 10/27/2022 29    • hs TnI 0hr 10/27/2022 10    • NT-proBNP 10/27/2022 181    • Ventricular Rate 10/27/2022 61    • Atrial Rate 10/27/2022 202    • QRSD Interval 10/27/2022 88    • QT Interval 10/27/2022 358    • QTC Interval 10/27/2022 360    • QRS Axis 10/27/2022 56    • T Wave Axis 10/27/2022 65    • hs TnI 2hr 10/27/2022 11    • Delta 2hr hsTnI 10/27/2022 1    • hs TnI 4hr 10/27/2022 10    • Delta 4hr hsTnI 10/27/2022 0    • Ventricular Rate 10/27/2022 56    • QRSD Interval 10/27/2022 78    • QT Interval 10/27/2022 424    • QTC Interval 10/27/2022 409    • QRS Axis 10/27/2022 71    • T Wave Axis 10/27/2022 75    • Ventricular Rate 10/27/2022 78    • Atrial Rate 10/27/2022 394    • QRSD Interval 10/27/2022 68    • QT Interval 10/27/2022 216    • QTC Interval 10/27/2022 246    • QRS Axis 10/27/2022 0    • T Wave Axis 10/27/2022 270    • SARS-CoV-2 10/27/2022 Positive (A)    • INFLUENZA A PCR 10/27/2022 Negative    • INFLUENZA B PCR 10/27/2022 Negative    • RSV PCR 10/27/2022 Negative    • C difficile toxin by PCR 10/28/2022 Negative    • Salmonella sp PCR 10/28/2022 None Detected    • Shigella sp/Enteroinvasi* 10/28/2022 None Detected    • Campylobacter sp (jejuni* 10/28/2022 None Detected    • Shiga toxin 1/Shiga toxi* 10/28/2022 None Detected    • POC Glucose 10/27/2022 73    • POC Glucose 10/27/2022 66    • POC Glucose 10/27/2022 114    • Sodium 10/28/2022 137    • Potassium 10/28/2022 5 6 (H)    • Chloride 10/28/2022 109 (H)    • CO2 10/28/2022 21    • ANION GAP 10/28/2022 7    • BUN 10/28/2022 45 (H)    • Creatinine 10/28/2022 1 77 (H)    • Glucose 10/28/2022 108    • Calcium 10/28/2022 9 1    • eGFR 10/28/2022 35    • WBC 10/28/2022 7 57    • RBC 10/28/2022 4 17    • Hemoglobin 10/28/2022 11 9 (L)    • Hematocrit 10/28/2022 38 3    • MCV 10/28/2022 92    • MCH 10/28/2022 28 5    • MCHC 10/28/2022 31 1 (L)    • RDW 10/28/2022 13 6    • MPV 10/28/2022 11 1    • Platelets 75/68/3910 191    • nRBC 10/28/2022 0    • Neutrophils Relative 10/28/2022 66    • Immat GRANS % 10/28/2022 0    • Lymphocytes Relative 10/28/2022 22    • Monocytes Relative 10/28/2022 9    • Eosinophils Relative 10/28/2022 2    • Basophils Relative 10/28/2022 1    • Neutrophils Absolute 10/28/2022 5 03    • Immature Grans Absolute 10/28/2022 0 03    • Lymphocytes Absolute 10/28/2022 1 65    • Monocytes Absolute 10/28/2022 0 67    • Eosinophils Absolute 10/28/2022 0 14    • Basophils Absolute 10/28/2022 0 05    • Magnesium 10/28/2022 1 3 (L)    • POC Glucose 10/28/2022 81    • POC Glucose 10/28/2022 124    • POC Glucose 10/28/2022 164 (H)    • POC Glucose 10/28/2022 138    • Sodium 10/29/2022 139    • Potassium 10/29/2022 4 8    • Chloride 10/29/2022 113 (H)    • CO2 10/29/2022 22    • ANION GAP 10/29/2022 4    • BUN 10/29/2022 34 (H)    • Creatinine 10/29/2022 1 36 (H)    • Glucose 10/29/2022 107    • Calcium 10/29/2022 9 1    • eGFR 10/29/2022 48    • Magnesium 10/29/2022 1 7    • POC Glucose 10/29/2022 85    • POC Glucose 10/29/2022 134    • POC Glucose 10/29/2022 143 (H)    • POC Glucose 10/29/2022 177 (H)    • Sodium 10/30/2022 141    • Potassium 10/30/2022 5 1    • Chloride 10/30/2022 117 (H)    • CO2 10/30/2022 19 (L)    • ANION GAP 10/30/2022 5    • BUN 10/30/2022 19    • Creatinine 10/30/2022 1 12    • Glucose 10/30/2022 111    • Calcium 10/30/2022 8 6    • eGFR 10/30/2022 61    • POC Glucose 10/30/2022 99    • POC Glucose 10/30/2022 159 (H)          Radiology Results:   No results found

## 2023-01-18 ENCOUNTER — CONSULT (OUTPATIENT)
Dept: PULMONOLOGY | Facility: CLINIC | Age: 83
End: 2023-01-18

## 2023-01-18 VITALS
RESPIRATION RATE: 20 BRPM | OXYGEN SATURATION: 95 % | HEART RATE: 79 BPM | HEIGHT: 70 IN | TEMPERATURE: 98.2 F | DIASTOLIC BLOOD PRESSURE: 62 MMHG | WEIGHT: 308 LBS | TEMPERATURE: 98.2 F | BODY MASS INDEX: 44.09 KG/M2 | BODY MASS INDEX: 44.09 KG/M2 | OXYGEN SATURATION: 95 % | HEART RATE: 79 BPM | WEIGHT: 308 LBS | HEIGHT: 70 IN | SYSTOLIC BLOOD PRESSURE: 120 MMHG | DIASTOLIC BLOOD PRESSURE: 62 MMHG | RESPIRATION RATE: 20 BRPM | SYSTOLIC BLOOD PRESSURE: 120 MMHG

## 2023-01-18 DIAGNOSIS — R91.8 LUNG NODULES: Primary | ICD-10-CM

## 2023-01-18 DIAGNOSIS — G47.19 EXCESSIVE DAYTIME SLEEPINESS: ICD-10-CM

## 2023-01-18 NOTE — ASSESSMENT & PLAN NOTE
Alek Mendez has multiple lung nodules, the largest of which has previously been biopsied and found to be granulomatous with some necrotic features  Prior evaluation for tuberculosis was negative        · Repeat CT scan in April, 6 months from prior scan  · Further testing after that to be determined based on the etiology of his liver lesion

## 2023-01-18 NOTE — ASSESSMENT & PLAN NOTE
Drinda Boxer has high pretest probability for sleep apnea  He is amenable to evaluation for CPAP      · Collect split-night study based on high pretest probability

## 2023-01-18 NOTE — PROGRESS NOTES
Pulmonary Consultation   Kyleigh Simms [de-identified] y o  male MRN: [id number]  1/18/2023    Assessment:    Lung nodules  Lolis Dinh has multiple lung nodules, the largest of which has previously been biopsied and found to be granulomatous with some necrotic features  Prior evaluation for tuberculosis was negative  Repeat CT scan in April, 6 months from prior scan  Further testing after that to be determined based on the etiology of his liver lesion    Excessive daytime sleepiness  Lolis Dinh has high pretest probability for sleep apnea  He is amenable to evaluation for CPAP  Collect split-night study based on high pretest probability      Plan:    Diagnoses and all orders for this visit:    Lung nodules  -     CT chest without contrast; Future    Excessive daytime sleepiness  -     Split Study; Future    Return in about 3 months (around 4/18/2023)  History of Present Illness   HPI:  Kyleigh Simms is a [de-identified] y o  male who presents for an evaluation of an abnormal lung CAT scan  The patient was previously followed within the [facility] but transitioned to our health network due to a change in his insurance  Lolis Dinh started to complain of some left upper chest and left arm pain back in January 2021  This prompted evaluation by his cardiologist who did not find an ischemic cause, but did send him for a CT scan which revealed a 3 2 cm left upper lobe pleural-based nodule  He then went for a PET scan which showed hypermetabolic activity in that nodule and in a left-sided paratracheal lymph node  These were both ultimately biopsied and negative for malignancy, but 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  He subsequently has undergone some additional CT scans of his chest which have shown a decrease in size of that nodule, as well as several other subcentimeter stable nodules for close to 2 years now      Lolis Dinh complains about some ongoing pain, which she felt was initially related to the biopsy, but recognizes was probably going on prior to that  This pain is along his left back and radiating down into his left arm on the posterior aspect  He has not really tried anything particular for that pain  He does admit to some significant neck stiffness and increasing pain with lateral rotation of the neck  Eber Cordero also complains about some excessive fatigue and poor quality sleep  He notes his wife was previously on a CPAP and he is familiar with it  He does not report that anybody notices him snoring, but he has unrefreshing sleep, morbid obesity, and a thick neck, all of which raises pretest probability  Finally, he has been given an albuterol rescue inhaler which she only needs to use if he is walking for period of time 15 minutes or longer by his estimate  He does get good therapeutic benefit from this  He does not use this often enough to suggest that he would benefit from a maintenance inhaler  Review of Systems   Respiratory: Positive for shortness of breath  Cardiovascular: Positive for chest pain  Musculoskeletal: Positive for back pain  All other systems reviewed and are negative  Historical Information   Past Medical History:   Diagnosis Date   • Diabetes [facility])    • Essential tremor    • Hyperlipidemia    • Hypertension    • Obstructive sleep apnea    • Paroxysmal atrial fibrillation [facility])    • Sick sinus syndrome [facility])      Past Surgical History:   Procedure Laterality Date   • CT NEEDLE BIOPSY LUNG  2/4/2021     History reviewed  No pertinent family history      Meds/Allergies     Current Outpatient Medications:   •  acetaminophen (TYLENOL) 500 mg tablet, Take 500 mg by mouth every 6 (six) hours as needed, Disp: , Rfl:   •  albuterol (PROVENTIL HFA,VENTOLIN HFA) 90 mcg/act inhaler, Inhale 2 puffs every 6 (six) hours as needed, Disp: , Rfl:   •  allopurinol (ZYLOPRIM) 100 mg tablet, Take 100 mg by mouth daily, Disp: , Rfl:   •  Ascorbic Acid (VITAMIN C PO), Take 2 tablets by mouth every morning, Disp: , Rfl:   •  bisacodyl (DULCOLAX) 5 mg EC tablet, Take 1 tablet (5 mg total) by mouth daily as needed for constipation, Disp: 60 tablet, Rfl: 5  •  CHOLECALCIFEROL PO, Take 2 tablets by mouth every morning, Disp: , Rfl:   •  colchicine (COLCRYS) 0 6 mg tablet, Take 0 6 mg by mouth 2 (two) times a day, Disp: , Rfl:   •  cyanocobalamin (VITAMIN B-12) 1000 MCG tablet, Take 1,000 mcg by mouth, Disp: , Rfl:   •  dabigatran etexilate (PRADAXA) 150 mg capsu, Take 1 capsule (150 mg total) by mouth every 12 (twelve) hours, Disp: , Rfl: 0  •  ezetimibe (ZETIA) 10 mg tablet, Take 10 mg by mouth daily, Disp: , Rfl:   •  FERROUS FUMARATE PO, Take 1 tablet by mouth every morning, Disp: , Rfl:   •  furosemide (LASIX) 20 mg tablet, Take 20 mg by mouth daily, Disp: , Rfl:   •  insulin degludec (TRESIBA) 100 units/mL injection pen, Inject 80 Units under the skin, Disp: , Rfl:   •  levothyroxine 112 mcg tablet, , Disp: , Rfl:   •  lisinopril (ZESTRIL) 5 mg tablet, , Disp: , Rfl:   •  loratadine (CLARITIN) 10 mg tablet, Take 10 mg by mouth daily, Disp: , Rfl:   •  metoprolol succinate (TOPROL-XL) 50 mg 24 hr tablet, Take 1 tablet (50 mg total) by mouth daily Do not start before October 31, 2022 , Disp: , Rfl: 0  •  polyethylene glycol (MIRALAX) 17 g packet, Take 17 g by mouth daily, Disp: 1700 g, Rfl: 3  •  tamsulosin (FLOMAX) 0 4 mg, Take 1 capsule (0 4 mg total) by mouth daily with dinner, Disp: , Rfl: 0  Allergies   Allergen Reactions   • Aspirin GI Bleeding and Other (See Comments)   • Sitagliptin Hives and Rash   • Canagliflozin Rash and Hives     Burning with urination and kidneys started to fail       Vitals: Blood pressure 120/62, pulse 79, temperature 98 2 °F (36 8 °C), temperature source Tympanic, resp  rate 20, height 5' 10" (1 778 m), weight (!) 140 kg (308 lb), SpO2 95 %  Body mass index is 44 19 kg/m²   Oxygen Therapy  SpO2: 95 %    Physical Exam  Physical Exam  Vitals reviewed  Constitutional:       General: He is not in acute distress  Appearance: Normal appearance  He is well-developed  He is obese  He is not ill-appearing  HENT:      Head: Normocephalic and atraumatic  Eyes:      General: No scleral icterus  Conjunctiva/sclera: Conjunctivae normal    Neck:      Thyroid: No thyromegaly  Vascular: No JVD  Cardiovascular:      Rate and Rhythm: Normal rate and regular rhythm  Heart sounds: Normal heart sounds  No murmur heard  No friction rub  No gallop  Pulmonary:      Effort: Pulmonary effort is normal  No respiratory distress  Breath sounds: Normal breath sounds  No wheezing or rales  Musculoskeletal:      Cervical back: Neck supple  Right lower leg: Edema present  Left lower leg: Edema present  Skin:     General: Skin is warm and dry  Findings: No rash  Neurological:      General: No focal deficit present  Mental Status: He is alert and oriented to person, place, and time  Mental status is at baseline  Psychiatric:         Mood and Affect: Mood normal          Behavior: Behavior normal      Labs: I have personally reviewed pertinent lab results  Lab Results   Component Value Date    WBC 7 57 10/28/2022    HGB 11 9 (L) 10/28/2022    HCT 38 3 10/28/2022    MCV 92 10/28/2022     10/28/2022     Lab Results   Component Value Date    CALCIUM 8 6 10/30/2022    K 5 1 10/30/2022    CO2 19 (L) 10/30/2022     (H) 10/30/2022    BUN 19 10/30/2022    CREATININE 1 12 10/30/2022     No results found for: IGE  Lab Results   Component Value Date    ALT 36 10/27/2022    AST 20 10/27/2022    ALKPHOS 53 10/27/2022       Imaging and other studies: I have personally reviewed relevant images in PACS  EKG, Pathology, and Other Studies: I have personally reviewed relevant reports in Epic  [facility]

## 2023-01-20 PROBLEM — E66.01 OBESITY, MORBID (HCC): Status: ACTIVE | Noted: 2023-01-20

## 2023-01-24 ENCOUNTER — HOSPITAL ENCOUNTER (OUTPATIENT)
Dept: RADIOLOGY | Facility: HOSPITAL | Age: 83
Discharge: HOME | End: 2023-01-24

## 2023-01-24 DIAGNOSIS — K76.9 LIVER LESION, LEFT LOBE: ICD-10-CM

## 2023-01-24 RX ADMIN — GADOBUTROL 14 ML: 604.72 INJECTION INTRAVENOUS at 09:08

## 2023-01-24 NOTE — NURSING NOTE
Device interrogation for MRI done by JESSIKA Estrada clinical specialist  Device set to MRI safe mode @90 bpm     MRI abdomen w/without contrast complete  Pt tolerated well  VSS throughout scan  Pt alert and oriented x4 on room air  No complaints or visible signs of distress  Device reprogrammed to prior settings per cardiology by Sabi Childs, RN

## 2023-01-24 NOTE — NURSING NOTE
Device interrogation for MRI  Normal device function prior to MRI  Leads and device meet all requirements per policy for MRI  Device programmed DOO 90bpm per Cardiology for MRI  Patient has no complaints  Vital signs monitored throughout by A  Meaghan Bernardo RN  Normal device function post MRI  Device reprogrammed to prior settings per Cardiology

## 2023-02-02 ENCOUNTER — TELEPHONE (OUTPATIENT)
Dept: SLEEP CENTER | Facility: CLINIC | Age: 83
End: 2023-02-02

## 2023-02-02 NOTE — TELEPHONE ENCOUNTER
----- Message from Alisha Lopez MD sent at 1/31/2023  5:25 PM EST -----  Approved  ----- Message -----  From: Michelle Cruz  Sent: 1/19/2023   7:25 AM EST  To: Sleep Medicine [facility] [place] Provider    This Split Study sleep study needs approval      If approved please sign and return to clerical pool  If denied please include reasons why  Also provide alternative testing if warranted  Please sign and return to clerical pool

## 2023-02-21 ENCOUNTER — HOSPITAL ENCOUNTER (OUTPATIENT)
Dept: RADIOLOGY | Facility: HOSPITAL | Age: 83
Discharge: HOME | End: 2023-02-21
Attending: STUDENT IN AN ORGANIZED HEALTH CARE EDUCATION/TRAINING PROGRAM

## 2023-02-21 DIAGNOSIS — R13.10 DYSPHAGIA, UNSPECIFIED TYPE: ICD-10-CM

## 2023-02-21 NOTE — PROCEDURES
Video Swallow Study      Patient Name: Dylan EUBANKS'Y Date: 2/21/2023        Past Medical History  Past Medical History:   Diagnosis Date   • Diabetes [facility])    • Essential tremor    • Hyperlipidemia    • Hypertension    • Obstructive sleep apnea    • Paroxysmal atrial fibrillation [facility])    • Sick sinus syndrome [facility])         Past Surgical History  Past Surgical History:   Procedure Laterality Date   • CT NEEDLE BIOPSY LUNG  2/4/2021         General Information:    [de-identified] yo gentleman referred to [facility]  for a VBS by Dr Melania Ayala for dysphagia w/  c/o solid food dysphagia  Pt c/o food getting stuck in throat area, no difficulty w/ liquids; pills sometimes get stuck  Cognition:  WFL    Speech/Swallow Mech: Oral motor movements appeared  WFL; Dentition was  Upper denture, lower edentulous; Respiratory Status: RA;   Current diet: regular diet w/ thin liquids  Prior VBS none  Pt was seen in radiology for a Video Barium Swallow Study, seated in the upright position and viewed laterally with the following consistencies: puree, solids, nectar thick liquids, thin liquids, barium pill w/ water by cup  Results are as follows:     **Images are available for review on PACS        Oral Stage:   pt w/ adequate bolus retrieval via spoon, cup and straw  Mastication/manipulation were [place]  Adequate bolus formation, segmented transfers with NTL and solids  Oral residue noted  Pharyngeal Stage:   Swallow initiation was prompt with complete laryngeal excursion, epiglottic inversion, and airway closure  Deep transient penetration noted x1 w/ consecutive sips of thin liquids by cup and consecutive sips by straw, but no aspiration noted  No pharyngeal retention observed  Esophageal Stage:   briefly assessed; all materials cleared the esophagus and emptied into the stomach w/o incident          Assessment Summary:   Oral and pharyngeal stages appeared [place] transfers, oral residue noted, transient penetration noted x1 with thin liquids by cup and x1 w/ thin liquids by straw, but no aspiration observed                 Recommendations:   Cont Regular diet w/ thin liquids  meds as tolerated      April A Amarjit Bauman MA CCC-SLP  Speech Pathologist  [place] license # [id number]  [place] license # [id number]  Available via [employer]

## 2023-04-19 ENCOUNTER — HOSPITAL ENCOUNTER (OUTPATIENT)
Dept: RADIOLOGY | Facility: IMAGING CENTER | Age: 83
Discharge: HOME | End: 2023-04-19

## 2023-04-19 DIAGNOSIS — R91.8 LUNG NODULES: ICD-10-CM

## 2023-05-04 ENCOUNTER — OFFICE VISIT (OUTPATIENT)
Dept: PULMONOLOGY | Facility: CLINIC | Age: 83
End: 2023-05-04

## 2023-05-04 VITALS
OXYGEN SATURATION: 94 % | HEART RATE: 74 BPM | BODY MASS INDEX: 44.32 KG/M2 | TEMPERATURE: 97.1 F | WEIGHT: 313.3 LBS | DIASTOLIC BLOOD PRESSURE: 60 MMHG | SYSTOLIC BLOOD PRESSURE: 112 MMHG

## 2023-05-04 DIAGNOSIS — G47.19 EXCESSIVE DAYTIME SLEEPINESS: ICD-10-CM

## 2023-05-04 DIAGNOSIS — R07.89 ATYPICAL CHEST PAIN: ICD-10-CM

## 2023-05-04 DIAGNOSIS — E66.01 OBESITY, MORBID (HCC): ICD-10-CM

## 2023-05-04 DIAGNOSIS — R91.8 LUNG NODULES: Primary | ICD-10-CM

## 2023-05-04 NOTE — PROGRESS NOTES
Pulmonary Follow Up Note   Poonam Vasquez [de-identified] y o  male MRN: [id number]  5/5/2023    Assessment:    Lung nodules  Fortunately, these have all decreased in size  I still do not know why he would have had proprionebacterium in his lungs, but this appears to have been all infectious in origin  To confirm the stability of everything I will order a follow up CT in 6 months  If this confirms stability, he does not need further follow up  Excessive daytime sleepiness  His sleep study is scheduled for August     Atypical chest pain  This persists, and I cannot rule out that it's related to his nodule as both started at around the same time  I advised to use occasional anti-inflammatories as needed, while being cautious about overuse  He is under the care of a Cardiologist who is not concerned for pacemaker complications or ischemic disease  Plan:    Diagnoses and all orders for this visit:    Lung nodules  -     CT chest without contrast; Future    Excessive daytime sleepiness    Atypical chest pain    Return in about 6 months (around 11/4/2023)  History of Present Illness   HPI:  Poonam Vasquez is a [de-identified] y o  male who presents for post-imaging follow up  His nodules and adenopathy have decreased in size and appear to be benign  He does complain of an atypical left sided pain which is constant, worse with movement, but improved since he started to have it 2 years ago around the time of his biopsy  He did see his Cardiologist who did not think it had relation to his pacemaker or to ischemic disease  No other new complaints  Review of Systems   Cardiovascular: Positive for chest pain  All other systems reviewed and are negative      Historical Information   Past Medical History:   Diagnosis Date    Diabetes ([address] )     Essential tremor     Hyperlipidemia     Hypertension     Obstructive sleep apnea     Paroxysmal atrial fibrillation (HCC)     Sick sinus syndrome [facility])      Past Surgical History: Procedure Laterality Date    CT NEEDLE BIOPSY LUNG  2/4/2021     No family history on file      Meds/Allergies     Current Outpatient Medications:     acetaminophen (TYLENOL) 500 mg tablet, Take 500 mg by mouth every 6 (six) hours as needed, Disp: , Rfl:     albuterol (PROVENTIL HFA,VENTOLIN HFA) 90 mcg/act inhaler, Inhale 2 puffs every 6 (six) hours as needed, Disp: , Rfl:     allopurinol (ZYLOPRIM) 100 mg tablet, Take 100 mg by mouth daily, Disp: , Rfl:     Ascorbic Acid (VITAMIN C PO), Take 2 tablets by mouth every morning, Disp: , Rfl:     bisacodyl (DULCOLAX) 5 mg EC tablet, Take 1 tablet (5 mg total) by mouth daily as needed for constipation, Disp: 60 tablet, Rfl: 5    CHOLECALCIFEROL PO, Take 2 tablets by mouth every morning, Disp: , Rfl:     colchicine (COLCRYS) 0 6 mg tablet, Take 0 6 mg by mouth 2 (two) times a day, Disp: , Rfl:     cyanocobalamin (VITAMIN B-12) 1000 MCG tablet, Take 1,000 mcg by mouth, Disp: , Rfl:     dabigatran etexilate (PRADAXA) 150 mg capsu, Take 1 capsule (150 mg total) by mouth every 12 (twelve) hours, Disp: , Rfl: 0    ezetimibe (ZETIA) 10 mg tablet, Take 10 mg by mouth daily, Disp: , Rfl:     FERROUS FUMARATE PO, Take 1 tablet by mouth every morning, Disp: , Rfl:     furosemide (LASIX) 20 mg tablet, Take 20 mg by mouth daily, Disp: , Rfl:     insulin degludec (TRESIBA) 100 units/mL injection pen, Inject 80 Units under the skin, Disp: , Rfl:     levothyroxine 112 mcg tablet, , Disp: , Rfl:     lisinopril (ZESTRIL) 5 mg tablet, , Disp: , Rfl:     loratadine (CLARITIN) 10 mg tablet, Take 10 mg by mouth daily, Disp: , Rfl:     metoprolol succinate (TOPROL-XL) 50 mg 24 hr tablet, Take 1 tablet (50 mg total) by mouth daily Do not start before October 31, 2022 , Disp: , Rfl: 0    pantoprazole (PROTONIX) 40 mg tablet, Take 1 tablet (40 mg total) by mouth daily, Disp: 30 tablet, Rfl: 2    polyethylene glycol (MIRALAX) 17 g packet, Take 17 g by mouth daily, Disp: 1700 g, Rfl: 3    tamsulosin (FLOMAX) 0 4 mg, Take 1 capsule (0 4 mg total) by mouth daily with dinner, Disp: , Rfl: 0    ipratropium (ATROVENT) 0 03 % nasal spray, 2 sprays into each nostril every 12 (twelve) hours, Disp: 10 mL, Rfl: 1  Allergies   Allergen Reactions    Aspirin GI Bleeding and Other (See Comments)    Sitagliptin Hives and Rash    Canagliflozin Rash and Hives     Burning with urination and kidneys started to fail       Vitals: Blood pressure 112/60, pulse 74, temperature (!) 97 1 °F (36 2 °C), temperature source Tympanic, weight (!) 142 kg (313 lb 4 8 oz), SpO2 94 %  Body mass index is 44 32 kg/m²  Oxygen Therapy  SpO2: 94 %  Oxygen Therapy: None (Room air)    Physical Exam  Physical Exam  Vitals reviewed  Constitutional:       General: He is not in acute distress  Appearance: Normal appearance  He is well-developed  He is obese  He is not ill-appearing  HENT:      Head: Normocephalic and atraumatic  Eyes:      General: No scleral icterus  Conjunctiva/sclera: Conjunctivae normal    Neck:      Thyroid: No thyromegaly  Vascular: No JVD  Cardiovascular:      Rate and Rhythm: Normal rate and regular rhythm  Heart sounds: Normal heart sounds  No murmur heard  No friction rub  No gallop  Pulmonary:      Effort: Pulmonary effort is normal  No respiratory distress  Breath sounds: Normal breath sounds  No wheezing or rales  Musculoskeletal:      Cervical back: Neck supple  Right lower leg: No edema  Left lower leg: No edema  Skin:     General: Skin is warm and dry  Findings: No rash  Neurological:      General: No focal deficit present  Mental Status: He is alert and oriented to person, place, and time  Mental status is at baseline  Psychiatric:         Mood and Affect: Mood normal          Behavior: Behavior normal          Labs: I have personally reviewed pertinent lab results    Lab Results   Component Value Date    WBC 7 57 10/28/2022    HGB 11 9 (L) 10/28/2022    HCT 38 3 10/28/2022    MCV 92 10/28/2022     10/28/2022     Lab Results   Component Value Date    CALCIUM 8 6 10/30/2022    K 5 1 10/30/2022    CO2 19 (L) 10/30/2022     (H) 10/30/2022    BUN 19 10/30/2022    CREATININE 1 12 10/30/2022     No results found for: IGE  Lab Results   Component Value Date    ALT 36 10/27/2022    AST 20 10/27/2022    ALKPHOS 53 10/27/2022       Imaging and other studies: I have personally reviewed relevant films in PACS  EKG, Pathology, and Other Studies: I have personally reviewed relevant reports in [facility]

## 2023-05-04 NOTE — ASSESSMENT & PLAN NOTE
This persists, and I cannot rule out that it's related to his nodule as both started at around the same time  I advised to use occasional anti-inflammatories as needed, while being cautious about overuse  He is under the care of a Cardiologist who is not concerned for pacemaker complications or ischemic disease

## 2023-05-04 NOTE — ASSESSMENT & PLAN NOTE
Fortunately, these have all decreased in size  I still do not know why he would have had proprionebacterium in his lungs, but this appears to have been all infectious in origin  To confirm the stability of everything I will order a follow up CT in 6 months  If this confirms stability, he does not need further follow up

## 2023-05-26 ENCOUNTER — OFFICE VISIT (OUTPATIENT)
Dept: GASTROENTEROLOGY | Facility: CLINIC | Age: 83
End: 2023-05-26

## 2023-05-26 VITALS
BODY MASS INDEX: 44.1 KG/M2 | DIASTOLIC BLOOD PRESSURE: 76 MMHG | SYSTOLIC BLOOD PRESSURE: 124 MMHG | HEIGHT: 71 IN | TEMPERATURE: 97.9 F | WEIGHT: 315 LBS

## 2023-05-26 DIAGNOSIS — R14.0 BLOATING: ICD-10-CM

## 2023-05-26 DIAGNOSIS — K76.9 LIVER LESION: Primary | ICD-10-CM

## 2023-05-26 NOTE — PROGRESS NOTES
[facility] - Outpatient Follow-up Note  Sherif Boudreaux 82 y o  male MRN: [id number]  Encounter: [id number]          ASSESSMENT AND PLAN:      1  Liver lesion        2  Bloating            Gave him pamphlet for low FODMAP diet  Encouraged him to be active/mobile after having a meal   MRI showing only focal fatty change  Likely do not need to follow this given benign nature of the lesion  Can consider repeat cross-sectional imaging in 2 years to see if this does not progress to anything ominous  ______________________________________________________________________    SUBJECTIVE: Patient here for follow-up visit  Last seen in January  Following liver lesion in left lobe of the liver  He underwent MRI in the interim which reveals focal fatty change of the liver  He has also been complaining of dysphagia to solid foods  Modified barium swallow was ordered  Oral and pharyngeal stages appeared within normal limits  Reviewed MRI results of mild focal fatty change in the area of the liver lesion  Today, patient feels well and has no complaints aside from abdominal bloating  He is not having any trouble swallowing at this time  The issues that he had that were evaluated previously have completely resolved  The abdominal bloating is his biggest complaint and notices that whenever he eats  Feels like his stomach swells up  He does eat eggs every morning  This is not affecting his quality of life significantly  REVIEW OF SYSTEMS IS OTHERWISE NEGATIVE        Historical Information   Past Medical History:   Diagnosis Date   • Diabetes [facility])    • Essential tremor    • Hyperlipidemia    • Hypertension    • Obstructive sleep apnea    • Paroxysmal atrial fibrillation [facility])    • Sick sinus syndrome [facility])      Past Surgical History:   Procedure Laterality Date   • CT NEEDLE BIOPSY LUNG  2/4/2021   • CT NEEDLE BIOPSY LUNG  2/4/2021     Social History   Social History     Substance and Sexual Activity   Alcohol Use Not Currently     Social History     Substance and Sexual Activity   Drug Use Never     Social History     Tobacco Use   Smoking Status Former   • Packs/day: 2 00   • Years: 7 00   • Total pack years: 14 00   • Types: Cigarettes   • Start date:  [address]   • Quit date: 65   • Years since quittin 4   Smokeless Tobacco Never     No family history on file  Meds/Allergies       Current Outpatient Medications:   •  acetaminophen (TYLENOL) 500 mg tablet  •  albuterol (PROVENTIL HFA,VENTOLIN HFA) 90 mcg/act inhaler  •  allopurinol (ZYLOPRIM) 100 mg tablet  •  Ascorbic Acid (VITAMIN C PO)  •  bisacodyl (DULCOLAX) 5 mg EC tablet  •  CHOLECALCIFEROL PO  •  colchicine (COLCRYS) 0 6 mg tablet  •  cyanocobalamin (VITAMIN B-12) 1000 MCG tablet  •  dabigatran etexilate (PRADAXA) 150 mg capsu  •  ezetimibe (ZETIA) 10 mg tablet  •  FERROUS FUMARATE PO  •  furosemide (LASIX) 20 mg tablet  •  insulin degludec (TRESIBA) 100 units/mL injection pen  •  ipratropium (ATROVENT) 0 03 % nasal spray  •  levothyroxine 112 mcg tablet  •  lisinopril (ZESTRIL) 5 mg tablet  •  loratadine (CLARITIN) 10 mg tablet  •  metoprolol succinate (TOPROL-XL) 50 mg 24 hr tablet  •  pantoprazole (PROTONIX) 40 mg tablet  •  polyethylene glycol (MIRALAX) 17 g packet  •  tamsulosin (FLOMAX) 0 4 mg    Allergies   Allergen Reactions   • Aspirin GI Bleeding and Other (See Comments)   • Sitagliptin Hives and Rash   • Canagliflozin Rash and Hives     Burning with urination and kidneys started to fail           Objective     There were no vitals taken for this visit  There is no height or weight on file to calculate BMI  PHYSICAL EXAM:      General Appearance:   Alert, cooperative, no distress   HEENT:   Normocephalic, atraumatic, anicteric           Lungs:   Equal chest rise and unlabored breathing, normal cough   Heart:   No visualized JVD   Abdomen:   Soft, non-tender, non-distended; no masses, no organomegaly    Genitalia: Deferred    Rectal:   Deferred    Extremities:  No cyanosis, clubbing or edema    Pulses:  Musculoskeletal:  2+ and symmetric  Normal range of motion visualized    Skin:  Neuro:  No jaundice, rashes, or lesions   Alert and appropriate           Lab Results:   No visits with results within 1 Day(s) from this visit     Latest known visit with results is:   Admission on 10/27/2022, Discharged on 10/30/2022   Component Date Value   • Ventricular Rate 10/27/2022 72    • Atrial Rate 10/27/2022 500    • GA Interval 10/27/2022 384    • QRSD Interval 10/27/2022 88    • QT Interval 10/27/2022 352    • QTC Interval 10/27/2022 385    • QRS Axis 10/27/2022 55    • T Wave Axis 10/27/2022 62    • WBC 10/27/2022 6 86    • RBC 10/27/2022 4 15    • Hemoglobin 10/27/2022 12 2    • Hematocrit 10/27/2022 37 7    • MCV 10/27/2022 91    • MCH 10/27/2022 29 4    • MCHC 10/27/2022 32 4    • RDW 10/27/2022 13 6    • MPV 10/27/2022 11 2    • Platelets 24/30/4032 190    • nRBC 10/27/2022 0    • Neutrophils Relative 10/27/2022 71    • Immat GRANS % 10/27/2022 0    • Lymphocytes Relative 10/27/2022 19    • Monocytes Relative 10/27/2022 8    • Eosinophils Relative 10/27/2022 2    • Basophils Relative 10/27/2022 0    • Neutrophils Absolute 10/27/2022 4 84    • Immature Grans Absolute 10/27/2022 0 03    • Lymphocytes Absolute 10/27/2022 1 28    • Monocytes Absolute 10/27/2022 0 55    • Eosinophils Absolute 10/27/2022 0 13    • Basophils Absolute 10/27/2022 0 03    • Sodium 10/27/2022 135    • Potassium 10/27/2022 5 8 (H)    • Chloride 10/27/2022 111 (H)    • CO2 10/27/2022 18 (L)    • ANION GAP 10/27/2022 6    • BUN 10/27/2022 46 (H)    • Creatinine 10/27/2022 2 07 (H)    • Glucose 10/27/2022 157 (H)    • Calcium 10/27/2022 9 4    • Corrected Calcium 10/27/2022 10 0    • AST 10/27/2022 20    • ALT 10/27/2022 36    • Alkaline Phosphatase 10/27/2022 53    • Total Protein 10/27/2022 6 6    • Albumin 10/27/2022 3 2 (L)    • Total Bilirubin 10/27/2022 0 54    • eGFR 10/27/2022 29    • hs TnI 0hr 10/27/2022 10    • NT-proBNP 10/27/2022 181    • Ventricular Rate 10/27/2022 61    • Atrial Rate 10/27/2022 202    • QRSD Interval 10/27/2022 88    • QT Interval 10/27/2022 358    • QTC Interval 10/27/2022 360    • QRS Axis 10/27/2022 56    • T Wave Axis 10/27/2022 65    • hs TnI 2hr 10/27/2022 11    • Delta 2hr hsTnI 10/27/2022 1    • hs TnI 4hr 10/27/2022 10    • Delta 4hr hsTnI 10/27/2022 0    • Ventricular Rate 10/27/2022 56    • QRSD Interval 10/27/2022 78    • QT Interval 10/27/2022 424    • QTC Interval 10/27/2022 409    • QRS Axis 10/27/2022 71    • T Wave Axis 10/27/2022 75    • Ventricular Rate 10/27/2022 78    • Atrial Rate 10/27/2022 394    • QRSD Interval 10/27/2022 68    • QT Interval 10/27/2022 216    • QTC Interval 10/27/2022 246    • QRS Axis 10/27/2022 0    • T Wave Axis 10/27/2022 270    • SARS-CoV-2 10/27/2022 Positive (A)    • INFLUENZA A PCR 10/27/2022 Negative    • INFLUENZA B PCR 10/27/2022 Negative    • RSV PCR 10/27/2022 Negative    • C difficile toxin by PCR 10/28/2022 Negative    • Salmonella sp PCR 10/28/2022 None Detected    • Shigella sp/Enteroinvasi* 10/28/2022 None Detected    • Campylobacter sp (jejuni* 10/28/2022 None Detected    • Shiga toxin 1/Shiga toxi* 10/28/2022 None Detected    • POC Glucose 10/27/2022 73    • POC Glucose 10/27/2022 66    • POC Glucose 10/27/2022 114    • Sodium 10/28/2022 137    • Potassium 10/28/2022 5 6 (H)    • Chloride 10/28/2022 109 (H)    • CO2 10/28/2022 21    • ANION GAP 10/28/2022 7    • BUN 10/28/2022 45 (H)    • Creatinine 10/28/2022 1 77 (H)    • Glucose 10/28/2022 108    • Calcium 10/28/2022 9 1    • eGFR 10/28/2022 35    • WBC 10/28/2022 7 57    • RBC 10/28/2022 4 17    • Hemoglobin 10/28/2022 11 9 (L)    • Hematocrit 10/28/2022 38 3    • MCV 10/28/2022 92    • MCH 10/28/2022 28 5    • MCHC 10/28/2022 31 1 (L)    • RDW 10/28/2022 13 6    • MPV 10/28/2022 11 1    • Platelets 14/19/7480 191    • nRBC 10/28/2022 0    • Neutrophils Relative 10/28/2022 66    • Immat GRANS % 10/28/2022 0    • Lymphocytes Relative 10/28/2022 22    • Monocytes Relative 10/28/2022 9    • Eosinophils Relative 10/28/2022 2    • Basophils Relative 10/28/2022 1    • Neutrophils Absolute 10/28/2022 5 03    • Immature Grans Absolute 10/28/2022 0 03    • Lymphocytes Absolute 10/28/2022 1 65    • Monocytes Absolute 10/28/2022 0 67    • Eosinophils Absolute 10/28/2022 0 14    • Basophils Absolute 10/28/2022 0 05    • Magnesium 10/28/2022 1 3 (L)    • POC Glucose 10/28/2022 81    • POC Glucose 10/28/2022 124    • POC Glucose 10/28/2022 164 (H)    • POC Glucose 10/28/2022 138    • Sodium 10/29/2022 139    • Potassium 10/29/2022 4 8    • Chloride 10/29/2022 113 (H)    • CO2 10/29/2022 22    • ANION GAP 10/29/2022 4    • BUN 10/29/2022 34 (H)    • Creatinine 10/29/2022 1 36 (H)    • Glucose 10/29/2022 107    • Calcium 10/29/2022 9 1    • eGFR 10/29/2022 48    • Magnesium 10/29/2022 1 7    • POC Glucose 10/29/2022 85    • POC Glucose 10/29/2022 134    • POC Glucose 10/29/2022 143 (H)    • POC Glucose 10/29/2022 177 (H)    • Sodium 10/30/2022 141    • Potassium 10/30/2022 5 1    • Chloride 10/30/2022 117 (H)    • CO2 10/30/2022 19 (L)    • ANION GAP 10/30/2022 5    • BUN 10/30/2022 19    • Creatinine 10/30/2022 1 12    • Glucose 10/30/2022 111    • Calcium 10/30/2022 8 6    • eGFR 10/30/2022 61    • POC Glucose 10/30/2022 99    • POC Glucose 10/30/2022 159 (H)          Radiology Results:   No results found

## 2023-06-08 ENCOUNTER — HOSPITAL ENCOUNTER (OUTPATIENT)
Dept: RADIOLOGY | Facility: IMAGING CENTER | Age: 83
End: 2023-06-08
Payer: MEDICARE

## 2023-06-08 DIAGNOSIS — M25.551 RIGHT HIP PAIN: ICD-10-CM

## 2023-06-08 PROCEDURE — 73502 X-RAY EXAM HIP UNI 2-3 VIEWS: CPT

## 2023-06-12 NOTE — PROGRESS NOTES
Chief Complaint: right hip pain    HPI:    Slava Fuentes is a 82y o  year old male  who presents today for new evaluation and treatment of right hip pain     Injury related: No    If not injury related:gradual starting 2 months ago    Description of symptoms:     Onset of pain about 2 months ago    Pain described as ache  Location of pain lateral hip and groin   Alleviating factors walker, laying on side   Aggravating factors physical therapy, sitting,     Denies any felicity trauma to area of chief complaint  (+) surgical history; total hip arthroplasty about 12 years ago, (+) lumbar spine surgery about 3 years ago   Does note numbness/tingling down leg prior to pain onset       Summary of treatment to-date:   Formal physical therapy 2 x per week for over 1 month   CSI in greater trochanteric bursa without relief     I have personally reviewed pertinent films in PACS and my interpretation is:  Right hip xray 06/08/2023: total hip arthoplasty intact, no acute osseous abnormality  Lumbar spine completed by [facility] 03/23/22: Impression: There is severe djd of the lumbar spine   There is extensive   facet arthropathy posteriorly   There is multi-level degenerative disk   disease most severe at L3-4 and L4-5        Patient Active Problem List   Diagnosis   • Obesity, morbid ([address] )   • Atypical chest pain   • Dysphagia   • Sick sinus syndrome (HCC)   • Paroxysmal atrial fibrillation (HCC)   • BPH (benign prostatic hyperplasia)   • Type 2 diabetes mellitus (HCC)   • COVID-19   • Lung nodules   • Excessive daytime sleepiness        Current Outpatient Medications on File Prior to Visit   Medication Sig Dispense Refill   • acetaminophen (TYLENOL) 500 mg tablet Take 500 mg by mouth every 6 (six) hours as needed     • albuterol (PROVENTIL HFA,VENTOLIN HFA) 90 mcg/act inhaler Inhale 2 puffs every 6 (six) hours as needed     • allopurinol (ZYLOPRIM) 100 mg tablet Take 100 mg by mouth daily     • Ascorbic Acid (VITAMIN C PO) Take 2 tablets by mouth every morning     • bisacodyl (DULCOLAX) 5 mg EC tablet Take 1 tablet (5 mg total) by mouth daily as needed for constipation 60 tablet 5   • CHOLECALCIFEROL PO Take 2 tablets by mouth every morning     • colchicine (COLCRYS) 0 6 mg tablet Take 0 6 mg by mouth 2 (two) times a day     • cyanocobalamin (VITAMIN B-12) 1000 MCG tablet Take 1,000 mcg by mouth     • dabigatran etexilate (PRADAXA) 150 mg capsu Take 1 capsule (150 mg total) by mouth every 12 (twelve) hours  0   • FERROUS FUMARATE PO Take 1 tablet by mouth every morning     • furosemide (LASIX) 20 mg tablet Take 20 mg by mouth daily     • insulin degludec (TRESIBA) 100 units/mL injection pen Inject 80 Units under the skin     • levothyroxine 112 mcg tablet      • lisinopril (ZESTRIL) 5 mg tablet      • loratadine (CLARITIN) 10 mg tablet Take 10 mg by mouth daily     • metoprolol succinate (TOPROL-XL) 50 mg 24 hr tablet Take 1 tablet (50 mg total) by mouth daily Do not start before October 31, 2022   0   • pantoprazole (PROTONIX) 40 mg tablet Take 1 tablet (40 mg total) by mouth daily 30 tablet 2   • polyethylene glycol (MIRALAX) 17 g packet Take 17 g by mouth daily 1700 g 3   • tamsulosin (FLOMAX) 0 4 mg Take 1 capsule (0 4 mg total) by mouth daily with dinner  0   • ezetimibe (ZETIA) 10 mg tablet Take 10 mg by mouth daily     • ipratropium (ATROVENT) 0 03 % nasal spray 2 sprays into each nostril every 12 (twelve) hours 10 mL 1     No current facility-administered medications on file prior to visit          Allergies   Allergen Reactions   • Aspirin GI Bleeding and Other (See Comments)   • Sitagliptin Hives and Rash   • Canagliflozin Rash and Hives     Burning with urination and kidneys started to fail        Tobacco Use: Medium Risk (6/13/2023)    Patient History    • Smoking Tobacco Use: Former    • Smokeless Tobacco Use: Never    • Passive Exposure: Not on file        Social Determinants of Health     Tobacco Use: Medium Risk (6/13/2023) Patient History    • Smoking Tobacco Use: Former    • Smokeless Tobacco Use: Never    • Passive Exposure: Not on file   Alcohol Use: Not on file   Financial Resource Strain: Not on file   Food Insecurity: Not on file   Transportation Needs: Not on file   Physical Activity: Not on file   Stress: Not on file   Social Connections: Not on file   Intimate Partner Violence: Not on file   Depression: Not on file   Housing Stability: Unknown (10/28/2022)    Housing Stability Vital Sign    • Unable to Pay for Housing in the Last Year: No    • Number of Places Lived in the Last Year: Not on file    • Unstable Housing in the Last Year: No               Review of Systems     Body mass index is 43 85 kg/m²  Physical Exam  Vitals and nursing note reviewed  Constitutional:       Appearance: Normal appearance  HENT:      Head: Atraumatic  Eyes:      Conjunctiva/sclera: Conjunctivae normal    Pulmonary:      Effort: Pulmonary effort is normal    Neurological:      Mental Status: He is alert and oriented to person, place, and time  Psychiatric:         Mood and Affect: Mood normal          Behavior: Behavior normal           Ortho Exam:    Body part: right hip    Inspection: no gross deformity,   Ambulate with walker   Significant b/l lower extremity edema   Able to transfer from chair to table   Able to lay supine     Palpation: (+) tenderness: L5-S1, right paraspinals, sciatic notch, greater trochanteric bursa     Range of motion:   Hip flexion with discomfort   Hip IR limited with discomfort   Hip ER intact     Special Tests:   Leg length equal     Distal Neurovascular Status: Intact, Yes     Procedures       Assessment:     Diagnosis ICD-10-CM Associated Orders   1  Pain in right hip  M25 551       2  BMI 40 0-44 9, adult ([facility] )  Z68 41       3  Hx of total hip arthroplasty, right  Z96 641       4   Greater trochanteric bursitis of right hip  M70 61            Plan:    We discussed clinical examination and findings with "Slava Fuentes  Reviewed previous right hip xray imaging and lumbar spine  Clinical presentation and findings consistent with multifactorial hip pain  We reviewed anatomical and biomechanics of affected area  Reviewed with patient that pain could originate from lumbar spine degenerative changes, hip osteoarthritis status post hip arthroplasty, weak gluteal medius muscle involvement, and/or greater trochanteric bursitis  Clinical examination and findings today correlating with greater trochanteric bursitis  Reviewed the potential for therapeutic/diagnostic ultrasound-guided corticosteroid injection to the greater trochanteric bursa  Reviewed risks and benefits  Patient would like to trial ultrasound-guided corticosteroid injection to the greater trochanteric bursa  No surgical management indicated at this time  Shared decision making, patient agreeable to plan  Follow-Up:    Return for Follow up for Ultrasound guided joint injection; should have  to drive patient home after U/S   Portions of the record may have been created with voice recognition software  Occasional wrong word or \"sound alike\" substitutions may have occurred due to the inherent limitations of voice recognition software  Please review the chart carefully and recognize, using context, where substitutions/typographical errors may have occurred           "

## 2023-06-13 ENCOUNTER — OFFICE VISIT (OUTPATIENT)
Dept: OBGYN CLINIC | Facility: OTHER | Age: 83
End: 2023-06-13

## 2023-06-13 VITALS
BODY MASS INDEX: 43.4 KG/M2 | DIASTOLIC BLOOD PRESSURE: 79 MMHG | HEIGHT: 71 IN | SYSTOLIC BLOOD PRESSURE: 135 MMHG | HEART RATE: 77 BPM | WEIGHT: 310 LBS

## 2023-06-13 DIAGNOSIS — M70.61 GREATER TROCHANTERIC BURSITIS OF RIGHT HIP: ICD-10-CM

## 2023-06-13 DIAGNOSIS — Z96.641 HX OF TOTAL HIP ARTHROPLASTY, RIGHT: ICD-10-CM

## 2023-06-13 DIAGNOSIS — M25.551 PAIN IN RIGHT HIP: Primary | ICD-10-CM

## 2023-07-03 ENCOUNTER — LAB REQUISITION (OUTPATIENT)
Dept: LAB | Facility: HOSPITAL | Age: 83
End: 2023-07-03
Payer: MEDICARE

## 2023-07-03 DIAGNOSIS — Z00.00 ENCOUNTER FOR GENERAL ADULT MEDICAL EXAMINATION WITHOUT ABNORMAL FINDINGS: ICD-10-CM

## 2023-07-03 LAB
ALBUMIN SERPL BCP-MCNC: 3.5 G/DL (ref 3.5–5)
ALP SERPL-CCNC: 65 U/L (ref 46–116)
ALT SERPL W P-5'-P-CCNC: 24 U/L (ref 12–78)
ANION GAP SERPL CALCULATED.3IONS-SCNC: 7 MMOL/L
AST SERPL W P-5'-P-CCNC: 20 U/L (ref 5–45)
BILIRUB SERPL-MCNC: 0.28 MG/DL (ref 0.2–1)
BUN SERPL-MCNC: 28 MG/DL (ref 5–25)
CALCIUM SERPL-MCNC: 9.1 MG/DL (ref 8.3–10.1)
CHLORIDE SERPL-SCNC: 107 MMOL/L (ref 96–108)
CO2 SERPL-SCNC: 24 MMOL/L (ref 21–32)
CREAT SERPL-MCNC: 1.64 MG/DL (ref 0.6–1.3)
EST. AVERAGE GLUCOSE BLD GHB EST-MCNC: 180 MG/DL
GFR SERPL CREATININE-BSD FRML MDRD: 38 ML/MIN/1.73SQ M
GLUCOSE SERPL-MCNC: 113 MG/DL (ref 65–140)
HBA1C MFR BLD: 7.9 %
POTASSIUM SERPL-SCNC: 4.5 MMOL/L (ref 3.5–5.3)
PROT SERPL-MCNC: 7.1 G/DL (ref 6.4–8.4)
SODIUM SERPL-SCNC: 138 MMOL/L (ref 135–147)
URATE SERPL-MCNC: 5.3 MG/DL (ref 3.5–8.5)

## 2023-07-03 PROCEDURE — 84439 ASSAY OF FREE THYROXINE: CPT | Performed by: FAMILY MEDICINE

## 2023-07-03 PROCEDURE — 83036 HEMOGLOBIN GLYCOSYLATED A1C: CPT | Performed by: FAMILY MEDICINE

## 2023-07-03 PROCEDURE — 80053 COMPREHEN METABOLIC PANEL: CPT | Performed by: FAMILY MEDICINE

## 2023-07-03 PROCEDURE — 84443 ASSAY THYROID STIM HORMONE: CPT | Performed by: FAMILY MEDICINE

## 2023-07-03 PROCEDURE — 84550 ASSAY OF BLOOD/URIC ACID: CPT | Performed by: FAMILY MEDICINE

## 2023-07-04 LAB
T4 FREE SERPL-MCNC: 0.88 NG/DL (ref 0.61–1.12)
TSH SERPL DL<=0.05 MIU/L-ACNC: 6.01 UIU/ML (ref 0.45–4.5)

## 2023-07-14 ENCOUNTER — LAB REQUISITION (OUTPATIENT)
Dept: LAB | Facility: HOSPITAL | Age: 83
End: 2023-07-14
Payer: MEDICARE

## 2023-07-14 DIAGNOSIS — Z00.00 ENCOUNTER FOR GENERAL ADULT MEDICAL EXAMINATION WITHOUT ABNORMAL FINDINGS: ICD-10-CM

## 2023-07-14 LAB
ALBUMIN SERPL BCP-MCNC: 3.8 G/DL (ref 3.5–5)
ALP SERPL-CCNC: 60 U/L (ref 46–116)
ALT SERPL W P-5'-P-CCNC: 19 U/L (ref 12–78)
ANION GAP SERPL CALCULATED.3IONS-SCNC: 4 MMOL/L
AST SERPL W P-5'-P-CCNC: 15 U/L (ref 5–45)
BASOPHILS # BLD AUTO: 0.05 THOUSANDS/ÂΜL (ref 0–0.1)
BASOPHILS NFR BLD AUTO: 0 % (ref 0–1)
BILIRUB SERPL-MCNC: 0.53 MG/DL (ref 0.2–1)
BNP SERPL-MCNC: 71 PG/ML (ref 0–100)
BUN SERPL-MCNC: 25 MG/DL (ref 5–25)
CALCIUM SERPL-MCNC: 9.2 MG/DL (ref 8.3–10.1)
CHLORIDE SERPL-SCNC: 108 MMOL/L (ref 96–108)
CO2 SERPL-SCNC: 26 MMOL/L (ref 21–32)
CREAT SERPL-MCNC: 1.31 MG/DL (ref 0.6–1.3)
EOSINOPHIL # BLD AUTO: 0.16 THOUSAND/ÂΜL (ref 0–0.61)
EOSINOPHIL NFR BLD AUTO: 1 % (ref 0–6)
ERYTHROCYTE [DISTWIDTH] IN BLOOD BY AUTOMATED COUNT: 15.3 % (ref 11.6–15.1)
EST. AVERAGE GLUCOSE BLD GHB EST-MCNC: 180 MG/DL
GFR SERPL CREATININE-BSD FRML MDRD: 50 ML/MIN/1.73SQ M
GLUCOSE SERPL-MCNC: 65 MG/DL (ref 65–140)
HBA1C MFR BLD: 7.9 %
HCT VFR BLD AUTO: 44.2 % (ref 36.5–49.3)
HGB BLD-MCNC: 13 G/DL (ref 12–17)
IMM GRANULOCYTES # BLD AUTO: 0.1 THOUSAND/UL (ref 0–0.2)
IMM GRANULOCYTES NFR BLD AUTO: 1 % (ref 0–2)
LYMPHOCYTES # BLD AUTO: 1.71 THOUSANDS/ÂΜL (ref 0.6–4.47)
LYMPHOCYTES NFR BLD AUTO: 14 % (ref 14–44)
MCH RBC QN AUTO: 28.6 PG (ref 26.8–34.3)
MCHC RBC AUTO-ENTMCNC: 29.4 G/DL (ref 31.4–37.4)
MCV RBC AUTO: 97 FL (ref 82–98)
MONOCYTES # BLD AUTO: 1.04 THOUSAND/ÂΜL (ref 0.17–1.22)
MONOCYTES NFR BLD AUTO: 8 % (ref 4–12)
NEUTROPHILS # BLD AUTO: 9.46 THOUSANDS/ÂΜL (ref 1.85–7.62)
NEUTS SEG NFR BLD AUTO: 76 % (ref 43–75)
NRBC BLD AUTO-RTO: 0 /100 WBCS
PLATELET # BLD AUTO: 261 THOUSANDS/UL (ref 149–390)
PMV BLD AUTO: 11 FL (ref 8.9–12.7)
POTASSIUM SERPL-SCNC: 4.8 MMOL/L (ref 3.5–5.3)
PROT SERPL-MCNC: 7.6 G/DL (ref 6.4–8.4)
RBC # BLD AUTO: 4.54 MILLION/UL (ref 3.88–5.62)
SODIUM SERPL-SCNC: 138 MMOL/L (ref 135–147)
T4 FREE SERPL-MCNC: 0.93 NG/DL (ref 0.61–1.12)
TSH SERPL DL<=0.05 MIU/L-ACNC: 4.46 UIU/ML (ref 0.45–4.5)
URATE SERPL-MCNC: 5.6 MG/DL (ref 3.5–8.5)
WBC # BLD AUTO: 12.52 THOUSAND/UL (ref 4.31–10.16)

## 2023-07-14 PROCEDURE — 85025 COMPLETE CBC W/AUTO DIFF WBC: CPT | Performed by: FAMILY MEDICINE

## 2023-07-14 PROCEDURE — 83880 ASSAY OF NATRIURETIC PEPTIDE: CPT | Performed by: FAMILY MEDICINE

## 2023-07-14 PROCEDURE — 80053 COMPREHEN METABOLIC PANEL: CPT | Performed by: FAMILY MEDICINE

## 2023-07-14 PROCEDURE — 84439 ASSAY OF FREE THYROXINE: CPT | Performed by: FAMILY MEDICINE

## 2023-07-14 PROCEDURE — 83036 HEMOGLOBIN GLYCOSYLATED A1C: CPT | Performed by: FAMILY MEDICINE

## 2023-07-14 PROCEDURE — 84443 ASSAY THYROID STIM HORMONE: CPT | Performed by: FAMILY MEDICINE

## 2023-07-14 PROCEDURE — 84550 ASSAY OF BLOOD/URIC ACID: CPT | Performed by: FAMILY MEDICINE

## 2023-07-27 ENCOUNTER — TELEPHONE (OUTPATIENT)
Dept: OBGYN CLINIC | Facility: HOSPITAL | Age: 83
End: 2023-07-27

## 2023-07-27 NOTE — TELEPHONE ENCOUNTER
Caller: Fina Ferreira @ [facility]    Doctor: Yunior Valera    Reason for call: [facility] calling to see if there is any prep for the patient's USGI    Call back#: [phone] ext [phone]

## 2023-07-28 NOTE — TELEPHONE ENCOUNTER
Called [place]  @ [facility] @ # provided  No answer  LVM letting her know there is no prep required for this injection  It is a regular steroid injection given in the office and we use the Ultrasound machine to guide the injection so that it can be administered more precisely to where patient is having pain  Asked her to call back with any questions or concerns

## 2023-08-04 ENCOUNTER — OFFICE VISIT (OUTPATIENT)
Dept: OBGYN CLINIC | Facility: OTHER | Age: 83
End: 2023-08-04
Payer: MEDICARE

## 2023-08-04 ENCOUNTER — APPOINTMENT (OUTPATIENT)
Dept: LAB | Facility: HOSPITAL | Age: 83
End: 2023-08-04
Payer: MEDICARE

## 2023-08-04 VITALS
HEART RATE: 72 BPM | WEIGHT: 310 LBS | DIASTOLIC BLOOD PRESSURE: 78 MMHG | SYSTOLIC BLOOD PRESSURE: 124 MMHG | BODY MASS INDEX: 44.38 KG/M2 | HEIGHT: 70 IN

## 2023-08-04 DIAGNOSIS — M70.61 GREATER TROCHANTERIC BURSITIS OF RIGHT HIP: Primary | ICD-10-CM

## 2023-08-04 DIAGNOSIS — L03.317 CELLULITIS OF BUTTOCK: ICD-10-CM

## 2023-08-04 PROCEDURE — 20611 DRAIN/INJ JOINT/BURSA W/US: CPT | Performed by: ORTHOPAEDIC SURGERY

## 2023-08-04 RX ORDER — BUPIVACAINE HYDROCHLORIDE 5 MG/ML
3.5 INJECTION, SOLUTION PERINEURAL
Status: COMPLETED | OUTPATIENT
Start: 2023-08-04 | End: 2023-08-04

## 2023-08-04 RX ORDER — CEPHALEXIN 500 MG/1
500 CAPSULE ORAL EVERY 6 HOURS SCHEDULED
Qty: 28 CAPSULE | Refills: 0 | Status: SHIPPED | OUTPATIENT
Start: 2023-08-04 | End: 2023-08-11

## 2023-08-04 RX ADMIN — BUPIVACAINE HYDROCHLORIDE 3.5 ML: 5 INJECTION, SOLUTION PERINEURAL at 07:30

## 2023-08-04 NOTE — PROGRESS NOTES
Large joint arthrocentesis: R hip joint  Universal Protocol:  Consent: Verbal consent obtained. Risks and benefits: risks, benefits and alternatives were discussed  Consent given by: patient  Patient understanding: patient states understanding of the procedure being performed  Patient consent: the patient's understanding of the procedure matches consent given  Radiology Images displayed and confirmed. If images not available, report reviewed: imaging studies available  Patient identity confirmed: verbally with patient    Supporting Documentation  Indications: pain   Procedure Details  Location: hip - R hip joint  Needle size: 22 G (5.5 inch needle)  Ultrasound guidance: yes  Approach: posterolateral  Medications administered: 3.5 mL bupivacaine 0.5 %    Aspirate: serous and bloody  Analysis: fluid sample sent for laboratory analysis    Patient tolerance: patient tolerated the procedure well with no immediate complications    Surgical scar was noted at the right hip. There was no erythema or warmth over the scar. A folliculitis was noted on the buttock but was not near the injection site. Fluid collection noted between adipose tissue and gluteal muscle layer. Ultrasound guided aspiration of fluid was sent for analysis.

## 2023-08-06 LAB
BACTERIA SPEC BFLD CULT: NO GROWTH
GRAM STN SPEC: NORMAL
GRAM STN SPEC: NORMAL

## 2023-08-08 ENCOUNTER — TELEPHONE (OUTPATIENT)
Dept: OBGYN CLINIC | Facility: OTHER | Age: 83
End: 2023-08-08

## 2023-08-08 LAB
BACTERIA SPEC BFLD CULT: NO GROWTH
GRAM STN SPEC: NORMAL
GRAM STN SPEC: NORMAL

## 2023-08-08 NOTE — TELEPHONE ENCOUNTER
I reached out to the patient while he resides in [facility]. I gave his nurse an update that the culture of the fluid aspiration that shows no growth. He may complete his antibiotics course which was order last office visit. He will need to see his orthopedics surgeon who did the hip replacement to see if it is ok to give steroid injection to the hip.

## 2023-08-14 ENCOUNTER — TELEPHONE (OUTPATIENT)
Age: 83
End: 2023-08-14

## 2023-08-14 NOTE — TELEPHONE ENCOUNTER
Caller: Alicia-wife    Doctor: Jorge Lovell    Reason for call: wife called stating the pt was getting muscles spasms in his leg since he started taking the Keflex. He stopped taking the medication and the muscle spasms stopped. Is this ok, is there another antibiotic he should take?      Call back#: [phone]

## 2023-08-15 ENCOUNTER — LAB REQUISITION (OUTPATIENT)
Dept: LAB | Facility: HOSPITAL | Age: 83
End: 2023-08-15
Payer: MEDICARE

## 2023-08-15 DIAGNOSIS — Z00.00 ENCOUNTER FOR GENERAL ADULT MEDICAL EXAMINATION WITHOUT ABNORMAL FINDINGS: ICD-10-CM

## 2023-08-15 LAB
ANION GAP SERPL CALCULATED.3IONS-SCNC: 7 MMOL/L
BASOPHILS # BLD AUTO: 0.05 THOUSANDS/ÂΜL (ref 0–0.1)
BASOPHILS NFR BLD AUTO: 1 % (ref 0–1)
BNP SERPL-MCNC: 40 PG/ML (ref 0–100)
BUN SERPL-MCNC: 28 MG/DL (ref 5–25)
CALCIUM SERPL-MCNC: 8.9 MG/DL (ref 8.3–10.1)
CHLORIDE SERPL-SCNC: 106 MMOL/L (ref 96–108)
CO2 SERPL-SCNC: 24 MMOL/L (ref 21–32)
CREAT SERPL-MCNC: 1.46 MG/DL (ref 0.6–1.3)
CRP SERPL QL: 50.1 MG/L
EOSINOPHIL # BLD AUTO: 0.3 THOUSAND/ÂΜL (ref 0–0.61)
EOSINOPHIL NFR BLD AUTO: 3 % (ref 0–6)
ERYTHROCYTE [DISTWIDTH] IN BLOOD BY AUTOMATED COUNT: 14.6 % (ref 11.6–15.1)
ERYTHROCYTE [SEDIMENTATION RATE] IN BLOOD: 62 MM/HOUR (ref 0–19)
GFR SERPL CREATININE-BSD FRML MDRD: 44 ML/MIN/1.73SQ M
GLUCOSE SERPL-MCNC: 149 MG/DL (ref 65–140)
HCT VFR BLD AUTO: 40.3 % (ref 36.5–49.3)
HGB BLD-MCNC: 12.6 G/DL (ref 12–17)
IMM GRANULOCYTES # BLD AUTO: 0.12 THOUSAND/UL (ref 0–0.2)
IMM GRANULOCYTES NFR BLD AUTO: 1 % (ref 0–2)
LYMPHOCYTES # BLD AUTO: 1.54 THOUSANDS/ÂΜL (ref 0.6–4.47)
LYMPHOCYTES NFR BLD AUTO: 17 % (ref 14–44)
MCH RBC QN AUTO: 28.4 PG (ref 26.8–34.3)
MCHC RBC AUTO-ENTMCNC: 31.3 G/DL (ref 31.4–37.4)
MCV RBC AUTO: 91 FL (ref 82–98)
MONOCYTES # BLD AUTO: 0.76 THOUSAND/ÂΜL (ref 0.17–1.22)
MONOCYTES NFR BLD AUTO: 8 % (ref 4–12)
NEUTROPHILS # BLD AUTO: 6.29 THOUSANDS/ÂΜL (ref 1.85–7.62)
NEUTS SEG NFR BLD AUTO: 70 % (ref 43–75)
NRBC BLD AUTO-RTO: 0 /100 WBCS
PLATELET # BLD AUTO: 251 THOUSANDS/UL (ref 149–390)
PMV BLD AUTO: 10.9 FL (ref 8.9–12.7)
POTASSIUM SERPL-SCNC: 4.3 MMOL/L (ref 3.5–5.3)
RBC # BLD AUTO: 4.44 MILLION/UL (ref 3.88–5.62)
SODIUM SERPL-SCNC: 137 MMOL/L (ref 135–147)
URATE SERPL-MCNC: 6.2 MG/DL (ref 3.5–8.5)
WBC # BLD AUTO: 9.06 THOUSAND/UL (ref 4.31–10.16)

## 2023-08-15 PROCEDURE — 86140 C-REACTIVE PROTEIN: CPT | Performed by: FAMILY MEDICINE

## 2023-08-15 PROCEDURE — 84550 ASSAY OF BLOOD/URIC ACID: CPT | Performed by: FAMILY MEDICINE

## 2023-08-15 PROCEDURE — 85025 COMPLETE CBC W/AUTO DIFF WBC: CPT | Performed by: FAMILY MEDICINE

## 2023-08-15 PROCEDURE — 80048 BASIC METABOLIC PNL TOTAL CA: CPT | Performed by: FAMILY MEDICINE

## 2023-08-15 PROCEDURE — 85652 RBC SED RATE AUTOMATED: CPT | Performed by: FAMILY MEDICINE

## 2023-08-15 PROCEDURE — 83880 ASSAY OF NATRIURETIC PEPTIDE: CPT | Performed by: FAMILY MEDICINE

## 2023-08-21 ENCOUNTER — HOSPITAL ENCOUNTER (OUTPATIENT)
Dept: RADIOLOGY | Age: 83
Discharge: HOME | End: 2023-08-21
Payer: MEDICARE

## 2023-08-21 DIAGNOSIS — M25.551 RIGHT HIP PAIN: ICD-10-CM

## 2023-08-21 PROCEDURE — 73701 CT LOWER EXTREMITY W/DYE: CPT

## 2023-08-21 RX ADMIN — IOHEXOL 100 ML: 350 INJECTION, SOLUTION INTRAVENOUS at 10:27

## 2023-08-23 ENCOUNTER — OFFICE VISIT (OUTPATIENT)
Dept: OBGYN CLINIC | Facility: OTHER | Age: 83
End: 2023-08-23
Payer: MEDICARE

## 2023-08-23 VITALS
HEIGHT: 70 IN | SYSTOLIC BLOOD PRESSURE: 146 MMHG | BODY MASS INDEX: 44.95 KG/M2 | WEIGHT: 314 LBS | DIASTOLIC BLOOD PRESSURE: 78 MMHG | HEART RATE: 73 BPM

## 2023-08-23 DIAGNOSIS — M54.16 RIGHT LUMBAR RADICULOPATHY: ICD-10-CM

## 2023-08-23 DIAGNOSIS — M70.61 TROCHANTERIC BURSITIS OF RIGHT HIP: Primary | ICD-10-CM

## 2023-08-23 PROCEDURE — 20611 DRAIN/INJ JOINT/BURSA W/US: CPT | Performed by: ORTHOPAEDIC SURGERY

## 2023-08-23 PROCEDURE — 99214 OFFICE O/P EST MOD 30 MIN: CPT | Performed by: ORTHOPAEDIC SURGERY

## 2023-08-23 RX ORDER — TRIAMCINOLONE ACETONIDE 40 MG/ML
40 INJECTION, SUSPENSION INTRA-ARTICULAR; INTRAMUSCULAR
Status: COMPLETED | OUTPATIENT
Start: 2023-08-23 | End: 2023-08-23

## 2023-08-23 RX ORDER — BUPIVACAINE HYDROCHLORIDE 2.5 MG/ML
4 INJECTION, SOLUTION INFILTRATION; PERINEURAL
Status: COMPLETED | OUTPATIENT
Start: 2023-08-23 | End: 2023-08-23

## 2023-08-23 RX ADMIN — TRIAMCINOLONE ACETONIDE 40 MG: 40 INJECTION, SUSPENSION INTRA-ARTICULAR; INTRAMUSCULAR at 11:30

## 2023-08-23 RX ADMIN — BUPIVACAINE HYDROCHLORIDE 4 ML: 2.5 INJECTION, SOLUTION INFILTRATION; PERINEURAL at 11:30

## 2023-08-23 NOTE — PATIENT INSTRUCTIONS
Patient was advised to keep the injection area clean and dry for the next 24 hours. Patient was also advised to avoid getting into a bathtub, swimming pool were bodies of water for 2-3 days. Patient may resume regular activities as tolerated and use local ice application or over-the-counter pain medications if there is any discomfort. Advised to call us back immediately or go to the emergency room if there is any significant pain, swelling, redness, warmth or symptoms like fever or chills. If pain persists despite right hip trochanteric bursa injection performed today, I recommend seeing pain management as the right buttock pain may be radiating pain from lumbar degenerative disc disease.

## 2023-08-23 NOTE — PROGRESS NOTES
Assessment:       1. Trochanteric bursitis of right hip    2. Right lumbar radiculopathy          Plan:        Explained my current clinical findings and reviewed the recent right hip CT scan findings and lab work with the patient. His right posterolateral hip pain is likely multifactorial.  There is no clinical or radiological evidence suggesting implant failure of the right hip. He may have some chronic inflammatory response post his total hip replacement arthroplasty. His subcutaneous fluid aspiration performed at the last office visit did not reveal any bacterial growth. He was agreeable for a trial of ultrasound-guided right trochanteric bursa cortisone injection which was performed on today's office visit. If he still has residual symptoms despite this measure then it is likely that symptoms are emanating from his lumbar radiculopathy. Patient does mention that he has previously benefited from lumbar epidural cortisone injections. Hence, I will make a referral to pain management in this regard. We will follow-up on an as-needed basis. Subjective:     Patient ID: Florinda Mendoza is a 82 y.o. male. Chief Complaint:    HPI  Mr. Nataliia Valencia presents today for a follow-up of his right buttock/posterolateral hip pain. Last seen in this regard on 6/13/2023 and subsequently on 8/4/2023. Patient has a history of chronic right posterolateral hip pain as well as low back pain with right lumbar radiculopathy. 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 Additionally, patient has had lumbar spine surgery in 2016 by Dr. Sabino Zambrano at an external facility and this included bilateral laminectomy, medial facetectomy, foraminotomies of L4-L5 and partial laminectomy at the L3-L4 bilaterally.   Patient has previously been under the care of pain management as well at the [facility] and received lumbar epidural cortisone injections as well as right trochanteric bursa cortisone injections. Per the patient, these injections used to help with his right buttock and hip pain. Per the electronic medical record note review from Dr. Margaret Schofield at [address] [place] [address], patient had reported 50 to 75% relief of the right anterior thigh symptoms following right L3-L4 transforaminal epidural steroid injection in 2020. Upon the patient's last office visit with me on 8/4/2022, a limited preprocedural ultrasound of the lateral proximal thigh and hip had revealed a subcutaneous fluid collection which was aspirated and sent for culture. This did not reveal any bacterial growth. Patient's plain radiograph of the right hip from 6/8/2023 had revealed a stable alignment of the right total hip replacement arthroplasty without any radiological evidence of hardware loosening or failure. Thereafter, patient's primary care physician also requested a CT scan of his right hip which was performed on 8/21/2023 and again did not reveal any acute osseous abnormality or evidence of right total hip arthroplasty failure. Additional lab work was ordered through his primary care physician which included a uric acid level, CBC both of which were within normal limits. He did have some elevation of his CRP. Patient currently denies any systemic symptoms like fever or chills or any active infections. Patient uses a walker for ambulatory support. He continues to experience right posterolateral hip pain as well as right-sided lower back pain and pain radiates down to his right lower extremity below his knee. He denies any worsening lower extremity numbness but does report a background history of neuropathy secondary to his diabetes. He denies any recent injury. Denies any worsening lower extremity weakness.       Social History     Occupational History   • Not on file   Tobacco Use   • Smoking status: Former     Packs/day: 2.00     Years: 7.00     Total pack years: 14.00 Types: Cigarettes     Start date: 82     Quit date: 1965     Years since quittin.6   • Smokeless tobacco: Never   Vaping Use   • Vaping Use: Never used   Substance and Sexual Activity   • Alcohol use: Not Currently   • Drug use: Never   • Sexual activity: Not on file      Review of Systems        Objective:     Right Hip Exam     Tenderness   The patient is experiencing tenderness in the greater trochanter (Well-healed linear lateral proximal thigh as well as gluteal surgical scars from previous surgery). Muscle Strength   Abduction: 4/5   Adduction: 4/5   Flexion: 4/5     Other   Erythema: absent    Comments:  Hip evaluation is limited due to patient's body habitus. Office-based lateral hip ultrasound reveals a hypoechoic collection of fluid in the subcutaneous tissue directly underlying the surgical scar of the lateral proximal thigh. There was also a mild fluid collection of the greater trochanteric bursa. Maximum point of tenderness was noted over the trochanteric bursa. Back Exam     Tenderness   The patient is experiencing tenderness in the lumbar (L3-S1). Tests   Straight leg raise right: positive at 60 deg    Comments:  Some bilateral lower extremity nondermatomal paresthesia but no focal dermatomal sensory deficit noted to light touch. No focal myotomal deficits noted in bilateral lower extremity. Physical Exam  Vitals and nursing note reviewed. Constitutional:       Appearance: He is well-developed. HENT:      Head: Normocephalic and atraumatic. Cardiovascular:      Rate and Rhythm: Normal rate. Pulmonary:      Effort: Pulmonary effort is normal. No respiratory distress. Musculoskeletal:      Lumbar back: Positive right straight leg raise test.   Skin:     General: Skin is warm. Findings: No erythema. Neurological:      Mental Status: He is alert and oriented to person, place, and time.    Psychiatric:         Behavior: Behavior normal.         Thought Content: Thought content normal.         Judgment: Judgment normal.             I have personally reviewed pertinent films in PACS and my interpretation is As noted above in the HPI. Large joint arthrocentesis: R greater trochanteric bursa  Universal Protocol:  Consent: Verbal consent obtained. Risks and benefits: risks, benefits and alternatives were discussed  Consent given by: patient  Patient understanding: patient states understanding of the procedure being performed  Site marked: the operative site was marked  Radiology Images displayed and confirmed.  If images not available, report reviewed: imaging studies available  Required items: required blood products, implants, devices, and special equipment available  Patient identity confirmed: verbally with patient    Supporting Documentation  Indications: pain and diagnostic evaluation   Procedure Details  Location: hip - R greater trochanteric bursa  Preparation: Patient was prepped and draped in the usual sterile fashion  Needle size: 22 G (3-1/2 and spinal needle)  Ultrasound guidance: yes (Curvilinear ultrasound transducer used with sterile ultrasound gel using a sterile technique)  Approach: posterolateral  Medications administered: 4 mL bupivacaine 0.25 %; 40 mg triamcinolone acetonide 40 mg/mL    Patient tolerance: patient tolerated the procedure well with no immediate complications  Dressing:  Sterile dressing applied

## 2023-09-25 ENCOUNTER — OFFICE VISIT (OUTPATIENT)
Dept: PAIN MEDICINE | Facility: CLINIC | Age: 83
End: 2023-09-25
Payer: MEDICARE

## 2023-09-25 VITALS — BODY MASS INDEX: 44.95 KG/M2 | WEIGHT: 314 LBS | HEIGHT: 70 IN

## 2023-09-25 DIAGNOSIS — M54.41 CHRONIC RIGHT-SIDED LOW BACK PAIN WITH RIGHT-SIDED SCIATICA: ICD-10-CM

## 2023-09-25 DIAGNOSIS — G89.29 CHRONIC RIGHT-SIDED LOW BACK PAIN WITH RIGHT-SIDED SCIATICA: ICD-10-CM

## 2023-09-25 DIAGNOSIS — Z98.890 HISTORY OF LUMBAR LAMINECTOMY: ICD-10-CM

## 2023-09-25 DIAGNOSIS — M46.1 SACROILIITIS (HCC): Primary | ICD-10-CM

## 2023-09-25 PROBLEM — I71.21 ANEURYSM OF ASCENDING AORTA WITHOUT RUPTURE (HCC): Status: ACTIVE | Noted: 2023-09-25

## 2023-09-25 PROCEDURE — 99204 OFFICE O/P NEW MOD 45 MIN: CPT | Performed by: ANESTHESIOLOGY

## 2023-09-25 NOTE — PROGRESS NOTES
Assessment  1. Sacroiliitis ([address])    2. Chronic right-sided low back pain with right-sided sciatica        Plan  Patient presenting with chronic right sided back pain radiating to the buttock, hip and posterior thigh for >1 year, worsening over the past several months. Pain is consistent with right sacroiliac joint dysfunction and accompanied by pain >7/10 on the pain scale with inability to participate in IADLs for >6 weeks. Patient has fully participated with physical therapy in the past with no benefit. Has been taking oxycodone, Lidocaine patches, Tylenol with modest benefit. Denies any bowel or bladder incontinence, saddle anesthesia. Reviewed and interpreted relevant imaging studies - specifically lumbar XRs and discussed the results and clinical significance with the patient. This shows multilevel extensive facet arthropathy in the lumbar spine. Patient reports a history of bilateral laminectomies at L3-4 and L4-5 in 2016. Subsequently he did have significant benefit with right L3 and L4 transforaminal LEANNE's at [facility] around 2020. More recently his pain has transition to the lower lumbar area and hip. He recently had a hip bursa injection with only temporary benefit. On exam today he does demonstrate signs consistent with right sacroiliac joint dysfunction. - I recommended and offered patient a right sacroiliac joint injection. We will schedule for this with fluoroscopic guidance.   Patient does fulfill the criteria for sacroiliac joint injection at this time With the following:  -Moderate to severe low back pain over the anatomical location of the right sacroiliac joint below L5,  -Pain duration of at least 3 months,  -No untreated radiculopathy,  -3 positive provocative maneuvers noted on exam: Doylene Mercedes, compression, distraction test  -Inadequate response to conservative methods for 4 weeks    The SI joint injection will be performed under fluoroscopic guidance, and subsequent therapeutic sacroiliac joint injections will be done if the diagnostic injections provide at least 75% sustained relief for the duration of the local anesthetic or anti-inflammatory. Risks, benefits, and alternatives to steroid injections discussed with patient. - Patient can continue with his current pain medication regimen as managed by his geriatrician (currently he is on oxycodone 5 mg as needed twice daily). Patient to follow up approximately 4 weeks after SIJ injection. Reviewed external notes from the relevant aspects of the patient's medical record, specifically orthopedic sports medicine, primary care physician notes in regards to current and prior treatments tried (as mentioned in history of present illness). Reviewed pertinent laboratory studies, specifically renal function, hemoglobin A1c, CBC, coagulation studies, prior to recommending medication therapies/interventional treatment options. [place] Prescription Drug Monitoring Program report was reviewed and was appropriate     My impressions and treatment recommendations were discussed in detail with the patient who verbalized understanding and had no further questions. Discharge instructions were provided. I personally saw and examined the patient and I agree with the above discussed plan of care. No orders of the defined types were placed in this encounter. No orders of the defined types were placed in this encounter. History of Present Illness    Zee Casarez is a 82 y.o. male presenting for new patient visit (referred by Dr. Joaquim Jaramillo) [facility] for exam and evaluation of chronic right sided low back pain radiating to the buttock and thigh for greater than 1 year, worsening over the past several months. Pain started without any precipitating injury or trauma. Over the past month, the intensity of pain has been severe. Pain is currently 8/10.  Pain does interfere with age appropriate activities of daily living. Pain is constant, with no typical pattern throughout the day. Pain is described as shooting, sharp, dull/aching. Patient endorses weakness in the lower extremities. Assistance device used: walker. Pain is increased with sitting, bending, walking. Pain is decreased with standing. Prior pertinent treatments tried: PT, surgery, ESIs, hip bursa injections. Pertinent medications tried/currently taking: oxycodone, Lidocaine patches, Tylenol    I have personally reviewed and/or updated the patient's past medical history, past surgical history, family history, social history, current medications, allergies, and vital signs today. Review of Systems   Constitutional: Negative for chills and fever. HENT: Negative for ear pain and sore throat. Eyes: Negative for pain and visual disturbance. Respiratory: Negative for cough and shortness of breath. Cardiovascular: Negative for chest pain and palpitations. Gastrointestinal: Negative for abdominal pain and vomiting. Endocrine: Positive for polydipsia and polyuria. Genitourinary: Negative for dysuria and hematuria. Musculoskeletal: Positive for back pain, gait problem and myalgias. Negative for arthralgias. Skin: Negative for color change and rash. Neurological: Positive for weakness and headaches. Negative for seizures and syncope. All other systems reviewed and are negative.       Patient Active Problem List   Diagnosis   • Obesity, morbid ([address])   • Atypical chest pain   • Dysphagia   • Sick sinus syndrome (HCC)   • Paroxysmal atrial fibrillation (HCC)   • BPH (benign prostatic hyperplasia)   • Type 2 diabetes mellitus ([address])   • COVID-19   • Lung nodules   • Excessive daytime sleepiness       Past Medical History:   Diagnosis Date   • Diabetes [facility])    • Essential tremor    • Hyperlipidemia    • Hypertension    • Obstructive sleep apnea    • Paroxysmal atrial fibrillation [facility])    • Sick sinus syndrome [facility])        Past Surgical History:   Procedure Laterality Date   • CT NEEDLE BIOPSY LUNG  2021   • CT NEEDLE BIOPSY LUNG  2021       History reviewed. No pertinent family history. Social History     Occupational History   • Not on file   Tobacco Use   • Smoking status: Former     Packs/day: 2.00     Years: 7.00     Total pack years: 14.00     Types: Cigarettes     Start date: 82     Quit date: 1965     Years since quittin.7   • Smokeless tobacco: Never   Vaping Use   • Vaping Use: Never used   Substance and Sexual Activity   • Alcohol use: Not Currently   • Drug use: Never   • Sexual activity: Not on file       Current Outpatient Medications on File Prior to Visit   Medication Sig   • acetaminophen (TYLENOL) 500 mg tablet Take 500 mg by mouth every 6 (six) hours as needed   • albuterol (PROVENTIL HFA,VENTOLIN HFA) 90 mcg/act inhaler Inhale 2 puffs every 6 (six) hours as needed   • allopurinol (ZYLOPRIM) 100 mg tablet Take 100 mg by mouth daily   • Ascorbic Acid (VITAMIN C PO) Take 2 tablets by mouth every morning   • bisacodyl (DULCOLAX) 5 mg EC tablet Take 1 tablet (5 mg total) by mouth daily as needed for constipation   • CHOLECALCIFEROL PO Take 2 tablets by mouth every morning   • colchicine (COLCRYS) 0.6 mg tablet Take 0.6 mg by mouth 2 (two) times a day   • cyanocobalamin (VITAMIN B-12) 1000 MCG tablet Take 1,000 mcg by mouth   • dabigatran etexilate (PRADAXA) 150 mg capsu Take 1 capsule (150 mg total) by mouth every 12 (twelve) hours   • FERROUS FUMARATE PO Take 1 tablet by mouth every morning   • furosemide (LASIX) 20 mg tablet Take 20 mg by mouth daily   • insulin degludec (TRESIBA) 100 units/mL injection pen Inject 80 Units under the skin   • levothyroxine 112 mcg tablet    • lisinopril (ZESTRIL) 5 mg tablet    • loratadine (CLARITIN) 10 mg tablet Take 10 mg by mouth daily   • metoprolol succinate (TOPROL-XL) 50 mg 24 hr tablet Take 1 tablet (50 mg total) by mouth daily Do not start before 2022.    • pantoprazole (PROTONIX) 40 mg tablet Take 1 tablet (40 mg total) by mouth daily   • polyethylene glycol (MIRALAX) 17 g packet Take 17 g by mouth daily   • tamsulosin (FLOMAX) 0.4 mg Take 1 capsule (0.4 mg total) by mouth daily with dinner   • azelastine (ASTELIN) 0.1 % nasal spray 1 spray into each nostril 2 (two) times a day Use in each nostril as directed   • ezetimibe (ZETIA) 10 mg tablet Take 10 mg by mouth daily   • ipratropium (ATROVENT) 0.03 % nasal spray 2 sprays into each nostril every 12 (twelve) hours     No current facility-administered medications on file prior to visit. Allergies   Allergen Reactions   • Aspirin GI Bleeding and Other (See Comments)   • Sitagliptin Hives and Rash   • Canagliflozin Rash and Hives     Burning with urination and kidneys started to fail       Physical Exam    Ht 5' 10" (1.778 m)   Wt (!) 142 kg (314 lb)   BMI 45.05 kg/m²     Constitutional: normal, well developed, well nourished, alert, in no distress and non-toxic and no overt pain behavior. and obese  Eyes: anicteric  HEENT: grossly intact  Neck: supple, symmetric, trachea midline and no masses   Pulmonary:even and unlabored  Cardiovascular:No edema or pitting edema present  Skin:Normal without rashes or lesions and well hydrated  Psychiatric:Mood and affect appropriate  Neurologic: No focal neurologic deficits  Musculoskeletal: Gait is slow, antalgic. Significant TTP over right SIJ. Positive Right Everardo's test, positive R SI compression test, Positive R SI distraction test    Imaging  XR lumbar spine 3/25/22:    Impression: There is severe djd of the lumbar spine.  There is extensive   facet arthropathy posteriorly.  There is multi-level degenerative disk   disease most severe at L3-4 and L4-5.

## 2023-10-05 ENCOUNTER — HOSPITAL ENCOUNTER (OUTPATIENT)
Dept: RADIOLOGY | Facility: MEDICAL CENTER | Age: 83
Discharge: HOME | End: 2023-10-05
Admitting: ANESTHESIOLOGY
Payer: MEDICARE

## 2023-10-05 VITALS
OXYGEN SATURATION: 95 % | RESPIRATION RATE: 20 BRPM | HEART RATE: 76 BPM | SYSTOLIC BLOOD PRESSURE: 165 MMHG | DIASTOLIC BLOOD PRESSURE: 81 MMHG | TEMPERATURE: 98.1 F

## 2023-10-05 DIAGNOSIS — M46.1 SACROILIITIS (HCC): ICD-10-CM

## 2023-10-05 PROCEDURE — 27096 INJECT SACROILIAC JOINT: CPT | Performed by: ANESTHESIOLOGY

## 2023-10-05 RX ORDER — BUPIVACAINE HCL/PF 2.5 MG/ML
3 VIAL (ML) INJECTION ONCE
Status: COMPLETED | OUTPATIENT
Start: 2023-10-05 | End: 2023-10-05

## 2023-10-05 RX ORDER — METHYLPREDNISOLONE ACETATE 40 MG/ML
40 INJECTION, SUSPENSION INTRA-ARTICULAR; INTRALESIONAL; INTRAMUSCULAR; PARENTERAL; SOFT TISSUE ONCE
Status: COMPLETED | OUTPATIENT
Start: 2023-10-05 | End: 2023-10-05

## 2023-10-05 RX ORDER — LIDOCAINE HYDROCHLORIDE 10 MG/ML
3 INJECTION, SOLUTION EPIDURAL; INFILTRATION; INTRACAUDAL; PERINEURAL ONCE
Status: COMPLETED | OUTPATIENT
Start: 2023-10-05 | End: 2023-10-05

## 2023-10-05 RX ADMIN — METHYLPREDNISOLONE ACETATE 40 MG: 40 INJECTION, SUSPENSION INTRA-ARTICULAR; INTRALESIONAL; INTRAMUSCULAR; PARENTERAL; SOFT TISSUE at 11:47

## 2023-10-05 RX ADMIN — IOHEXOL 1 ML: 300 INJECTION, SOLUTION INTRAVENOUS at 11:47

## 2023-10-05 RX ADMIN — Medication 3 ML: at 11:47

## 2023-10-05 RX ADMIN — LIDOCAINE HYDROCHLORIDE 3 ML: 10 INJECTION, SOLUTION EPIDURAL; INFILTRATION; INTRACAUDAL; PERINEURAL at 11:45

## 2023-10-05 NOTE — H&P
History of Present Illness:  The patient is a 82 y.o. male who presents with complaints of back pain    Past Medical History:   Diagnosis Date   • Diabetes ([address])    • Essential tremor    • Hyperlipidemia    • Hypertension    • Obstructive sleep apnea    • Paroxysmal atrial fibrillation (HCC)    • Sick sinus syndrome [facility])        Past Surgical History:   Procedure Laterality Date   • CT NEEDLE BIOPSY LUNG  2/4/2021   • CT NEEDLE BIOPSY LUNG  2/4/2021         Current Outpatient Medications:   •  acetaminophen (TYLENOL) 500 mg tablet, Take 500 mg by mouth every 6 (six) hours as needed, Disp: , Rfl:   •  albuterol (PROVENTIL HFA,VENTOLIN HFA) 90 mcg/act inhaler, Inhale 2 puffs every 6 (six) hours as needed, Disp: , Rfl:   •  allopurinol (ZYLOPRIM) 100 mg tablet, Take 100 mg by mouth daily, Disp: , Rfl:   •  Ascorbic Acid (VITAMIN C PO), Take 2 tablets by mouth every morning, Disp: , Rfl:   •  azelastine (ASTELIN) 0.1 % nasal spray, 1 spray into each nostril 2 (two) times a day Use in each nostril as directed, Disp: 5 mL, Rfl: 3  •  bisacodyl (DULCOLAX) 5 mg EC tablet, Take 1 tablet (5 mg total) by mouth daily as needed for constipation, Disp: 60 tablet, Rfl: 5  •  CHOLECALCIFEROL PO, Take 2 tablets by mouth every morning, Disp: , Rfl:   •  colchicine (COLCRYS) 0.6 mg tablet, Take 0.6 mg by mouth 2 (two) times a day, Disp: , Rfl:   •  cyanocobalamin (VITAMIN B-12) 1000 MCG tablet, Take 1,000 mcg by mouth, Disp: , Rfl:   •  dabigatran etexilate (PRADAXA) 150 mg capsu, Take 1 capsule (150 mg total) by mouth every 12 (twelve) hours, Disp: , Rfl: 0  •  ezetimibe (ZETIA) 10 mg tablet, Take 10 mg by mouth daily, Disp: , Rfl:   •  FERROUS FUMARATE PO, Take 1 tablet by mouth every morning, Disp: , Rfl:   •  furosemide (LASIX) 20 mg tablet, Take 20 mg by mouth daily, Disp: , Rfl:   •  insulin degludec (TRESIBA) 100 units/mL injection pen, Inject 80 Units under the skin, Disp: , Rfl:   •  ipratropium (ATROVENT) 0.03 % nasal spray, 2 sprays into each nostril every 12 (twelve) hours, Disp: 10 mL, Rfl: 1  •  levothyroxine 112 mcg tablet, , Disp: , Rfl:   •  lisinopril (ZESTRIL) 5 mg tablet, , Disp: , Rfl:   •  loratadine (CLARITIN) 10 mg tablet, Take 10 mg by mouth daily, Disp: , Rfl:   •  metoprolol succinate (TOPROL-XL) 50 mg 24 hr tablet, Take 1 tablet (50 mg total) by mouth daily Do not start before October 31, 2022., Disp: , Rfl: 0  •  pantoprazole (PROTONIX) 40 mg tablet, Take 1 tablet (40 mg total) by mouth daily, Disp: 30 tablet, Rfl: 2  •  polyethylene glycol (MIRALAX) 17 g packet, Take 17 g by mouth daily, Disp: 1700 g, Rfl: 3  •  tamsulosin (FLOMAX) 0.4 mg, Take 1 capsule (0.4 mg total) by mouth daily with dinner, Disp: , Rfl: 0    Current Facility-Administered Medications:   •  bupivacaine (PF) (MARCAINE) 0.25 % injection 3 mL, 3 mL, Intra-articular, Once, Will Dinora Edouard MD  •  iohexol (OMNIPAQUE) 300 mg/mL injection 1 mL, 1 mL, Intra-articular, Once, Will Dinora Edouard MD  •  lidocaine (PF) (XYLOCAINE-MPF) 1 % injection 3 mL, 3 mL, Infiltration, Once, Will Dinora Edouard MD  •  methylPREDNISolone acetate (DEPO-MEDROL) injection 40 mg, 40 mg, Intra-articular, Once, Will Dinora Edouard MD    Allergies   Allergen Reactions   • Aspirin GI Bleeding and Other (See Comments)   • Sitagliptin Hives and Rash   • Canagliflozin Rash and Hives     Burning with urination and kidneys started to fail       Physical Exam:   Vitals:    10/05/23 1133   BP: 154/78   Pulse: 78   Resp: 18   Temp: 98.1 °F (36.7 °C)   SpO2: 94%     General: Awake, Alert, Oriented x 3, Mood and affect appropriate  Respiratory: Respirations even and unlabored  Cardiovascular: Peripheral pulses intact; no edema  Musculoskeletal Exam: TTP R SIJ    ASA Score: 3    Patient/Chart Verification  Patient ID Verified: Verbal  ID Band Applied: No  Consents Confirmed: To be obtained in the Pre-Procedure area, Procedural  H&P( within 30 days) Verified:  To be obtained in the Pre-Procedure area  Interval H&P(within 24 hr) Complete (required for Outpatients and Surgery Admit only): To be obtained in the Pre-Procedure area  Allergies Reviewed: Yes  Anticoag/NSAID held?: NA  Currently on antibiotics?: No    Assessment:   1.  Sacroiliitis ([address])        Plan: right SIJ injection

## 2023-10-05 NOTE — DISCHARGE INSTRUCTIONS

## 2023-10-11 ENCOUNTER — TELEPHONE (OUTPATIENT)
Age: 83
End: 2023-10-11

## 2023-10-11 NOTE — TELEPHONE ENCOUNTER
Caller: [facility]     Doctor: Dr Leydi Logan     Reason for call: Lyndsey Cornell calling asking to schedule procedure for patient please advise     Call back#: [phone] ext [phone]

## 2023-10-11 NOTE — TELEPHONE ENCOUNTER
Attempted to contact SL at # provided but no VM and phone cont to ring. Will attempt at late time. Pt had recent SIJ on 10/5/23 and has f/u schd for 11/1/23. Need to clarify callers inquiry.

## 2023-10-12 ENCOUNTER — TELEPHONE (OUTPATIENT)
Dept: PAIN MEDICINE | Facility: CLINIC | Age: 83
End: 2023-10-12

## 2023-10-12 NOTE — TELEPHONE ENCOUNTER
Attempted to reach [facility] at [facility] [phone] multiple times, same result no VM phone cont to ring. S/w pt at [phone]. Per pt he is having a lot of relief from his SIJ injection on 10/5 and does not need a procedure. Denies any other type of pain. Verified phone number for [facility] with pt.

## 2023-10-13 ENCOUNTER — LAB REQUISITION (OUTPATIENT)
Dept: LAB | Facility: HOSPITAL | Age: 83
End: 2023-10-13
Payer: MEDICARE

## 2023-10-13 DIAGNOSIS — I10 ESSENTIAL (PRIMARY) HYPERTENSION: ICD-10-CM

## 2023-10-13 LAB
25(OH)D3 SERPL-MCNC: 45.7 NG/ML (ref 30–100)
ALBUMIN SERPL BCP-MCNC: 3.8 G/DL (ref 3.5–5)
ALP SERPL-CCNC: 53 U/L (ref 34–104)
ALT SERPL W P-5'-P-CCNC: 23 U/L (ref 7–52)
ANION GAP SERPL CALCULATED.3IONS-SCNC: 9 MMOL/L
AST SERPL W P-5'-P-CCNC: 28 U/L (ref 13–39)
BASOPHILS # BLD AUTO: 0.05 THOUSANDS/ÂΜL (ref 0–0.1)
BASOPHILS NFR BLD AUTO: 0 % (ref 0–1)
BILIRUB SERPL-MCNC: 0.54 MG/DL (ref 0.2–1)
BUN SERPL-MCNC: 27 MG/DL (ref 5–25)
CALCIUM SERPL-MCNC: 9.1 MG/DL (ref 8.4–10.2)
CHLORIDE SERPL-SCNC: 106 MMOL/L (ref 96–108)
CO2 SERPL-SCNC: 23 MMOL/L (ref 21–32)
CREAT SERPL-MCNC: 1.03 MG/DL (ref 0.6–1.3)
EOSINOPHIL # BLD AUTO: 0.24 THOUSAND/ÂΜL (ref 0–0.61)
EOSINOPHIL NFR BLD AUTO: 2 % (ref 0–6)
ERYTHROCYTE [DISTWIDTH] IN BLOOD BY AUTOMATED COUNT: 16.6 % (ref 11.6–15.1)
EST. AVERAGE GLUCOSE BLD GHB EST-MCNC: 200 MG/DL
GFR SERPL CREATININE-BSD FRML MDRD: 67 ML/MIN/1.73SQ M
GLUCOSE SERPL-MCNC: 105 MG/DL (ref 65–140)
HBA1C MFR BLD: 8.6 %
HCT VFR BLD AUTO: 43.9 % (ref 36.5–49.3)
HGB BLD-MCNC: 13.5 G/DL (ref 12–17)
IMM GRANULOCYTES # BLD AUTO: 0.12 THOUSAND/UL (ref 0–0.2)
IMM GRANULOCYTES NFR BLD AUTO: 1 % (ref 0–2)
LYMPHOCYTES # BLD AUTO: 1.42 THOUSANDS/ÂΜL (ref 0.6–4.47)
LYMPHOCYTES NFR BLD AUTO: 13 % (ref 14–44)
MCH RBC QN AUTO: 28.8 PG (ref 26.8–34.3)
MCHC RBC AUTO-ENTMCNC: 30.8 G/DL (ref 31.4–37.4)
MCV RBC AUTO: 94 FL (ref 82–98)
MONOCYTES # BLD AUTO: 0.69 THOUSAND/ÂΜL (ref 0.17–1.22)
MONOCYTES NFR BLD AUTO: 6 % (ref 4–12)
NEUTROPHILS # BLD AUTO: 8.71 THOUSANDS/ÂΜL (ref 1.85–7.62)
NEUTS SEG NFR BLD AUTO: 78 % (ref 43–75)
NRBC BLD AUTO-RTO: 0 /100 WBCS
PLATELET # BLD AUTO: 262 THOUSANDS/UL (ref 149–390)
PMV BLD AUTO: 11.4 FL (ref 8.9–12.7)
POTASSIUM SERPL-SCNC: 4.1 MMOL/L (ref 3.5–5.3)
PROT SERPL-MCNC: 6.8 G/DL (ref 6.4–8.4)
RBC # BLD AUTO: 4.68 MILLION/UL (ref 3.88–5.62)
SODIUM SERPL-SCNC: 138 MMOL/L (ref 135–147)
TSH SERPL DL<=0.05 MIU/L-ACNC: 6.66 UIU/ML (ref 0.45–4.5)
WBC # BLD AUTO: 11.23 THOUSAND/UL (ref 4.31–10.16)

## 2023-10-13 PROCEDURE — 83036 HEMOGLOBIN GLYCOSYLATED A1C: CPT | Performed by: FAMILY MEDICINE

## 2023-10-13 PROCEDURE — 84443 ASSAY THYROID STIM HORMONE: CPT | Performed by: FAMILY MEDICINE

## 2023-10-13 PROCEDURE — 82306 VITAMIN D 25 HYDROXY: CPT | Performed by: FAMILY MEDICINE

## 2023-10-13 PROCEDURE — 80053 COMPREHEN METABOLIC PANEL: CPT | Performed by: FAMILY MEDICINE

## 2023-10-13 PROCEDURE — 85025 COMPLETE CBC W/AUTO DIFF WBC: CPT | Performed by: FAMILY MEDICINE

## 2023-11-01 ENCOUNTER — OFFICE VISIT (OUTPATIENT)
Dept: PAIN MEDICINE | Facility: CLINIC | Age: 83
End: 2023-11-01
Payer: MEDICARE

## 2023-11-01 VITALS
SYSTOLIC BLOOD PRESSURE: 165 MMHG | DIASTOLIC BLOOD PRESSURE: 81 MMHG | HEIGHT: 70 IN | WEIGHT: 314 LBS | BODY MASS INDEX: 44.95 KG/M2

## 2023-11-01 DIAGNOSIS — M46.1 SACROILIITIS, NOT ELSEWHERE CLASSIFIED (HCC): ICD-10-CM

## 2023-11-01 DIAGNOSIS — M47.816 LUMBAR SPONDYLOSIS: ICD-10-CM

## 2023-11-01 DIAGNOSIS — M70.61 GREATER TROCHANTERIC BURSITIS OF RIGHT HIP: Primary | ICD-10-CM

## 2023-11-01 PROCEDURE — 99214 OFFICE O/P EST MOD 30 MIN: CPT | Performed by: ANESTHESIOLOGY

## 2023-11-01 NOTE — PROGRESS NOTES
Assessment:  1. Greater trochanteric bursitis of right hip    2. Sacroiliitis, not elsewhere classified ([address])    3. Lumbar spondylosis        Plan:    Patient presenting with chronic right sided lower pain radiating to the buttock, lateral hip and posterior thigh for >1 year, worsening over the past several months. Pain is consistent with right sacroiliac joint dysfunction, right greater trochanteric bursitis and accompanied by pain >7/10 on the pain scale with inability to participate in IADLs for >6 weeks. Patient has fully participated with physical therapy in the past with no benefit. Has been taking oxycodone, Lidocaine patches, Tylenol with modest benefit. Denies any bowel or bladder incontinence, saddle anesthesia. Reviewed and interpreted relevant imaging studies - specifically lumbar and hip XRs and discussed the results and clinical significance with the patient. This shows multilevel extensive facet arthropathy in the lumbar spine. Hip/pelvis x-rays show stable right total hip arthroplasty. Patient reports a history of bilateral laminectomies at L3-4 and L4-5 in 2016. Subsequently he did have significant benefit with right L3 and L4 transforaminal LEANNE's at [address] around 2020. More recently his pain has transition to the lower lumbar area and lateral hip. He recently had an ultrasound guided hip bursa injection with only temporary benefit. After R SIJ injection, he has noticed significant improvement of his lower lumbar pain but continues to have more lateral pain. He does continue to have significant tenderness overlying the greater trochanter.    - Discussed repeating right hip bursa injection with fluoroscopic guidance. If no benefit with hip bursa injection, will recommend obtaining lumbar MRI. Risks, benefits, and alternatives to steroid injections discussed with patient.       - Patient can continue with his current pain medication regimen as managed by his geriatrician (currently he is on oxycodone 5 mg as needed twice daily). Patient to follow up approximately 4 weeks after hip bursa injection. Reviewed external notes from the relevant aspects of the patient's medical record, specifically orthopedic sports medicine, primary care physician notes in regards to current and prior treatments tried (as mentioned in history of present illness). Reviewed pertinent laboratory studies, specifically renal function, hemoglobin A1c, CBC, coagulation studies, prior to recommending medication therapies/interventional treatment options. [place] Prescription Drug Monitoring Program report was reviewed and was appropriate      My impressions and treatment recommendations were discussed in detail with the patient who verbalized understanding and had no further questions. Discharge instructions were provided. I personally saw and examined the patient and I agree with the above discussed plan of care. Orders Placed This Encounter   Procedures    FL spine and pain procedure     Standing Status:   Future     Standing Expiration Date:   11/1/2027     Order Specific Question:   Reason for Exam:     Answer:   right hip bursa injection w/ fluoro     Order Specific Question:   Anticoagulant hold needed? Answer:   no     No orders of the defined types were placed in this encounter. History of Present Illness:  Cristiane Huber is a 82 y.o. male who presents for a follow up office visit in regards to Back Pain. The patient’s current symptoms include improved right lower lumbar pain. Residual symptoms are rated a 6/10 on the pain scale and described as an intermittent dull/aching, shooting pain worse in the morning. I have personally reviewed and/or updated the patient's past medical history, past surgical history, family history, social history, current medications, allergies, and vital signs today. Review of Systems   Constitutional:  Negative for chills and fever. HENT:  Negative for ear pain and sore throat. Eyes:  Negative for pain and visual disturbance. Respiratory:  Negative for cough and shortness of breath. Cardiovascular:  Negative for chest pain and palpitations. Gastrointestinal:  Negative for abdominal pain and vomiting. Genitourinary:  Negative for dysuria and hematuria. Musculoskeletal:  Positive for arthralgias, back pain and gait problem. Skin:  Negative for color change and rash. Neurological:  Positive for weakness. Negative for seizures and syncope. All other systems reviewed and are negative. Patient Active Problem List   Diagnosis    Obesity, morbid ([address] )    Atypical chest pain    Dysphagia    Sick sinus syndrome (HCC)    Paroxysmal atrial fibrillation (HCC)    BPH (benign prostatic hyperplasia)    Type 2 diabetes mellitus ([address] [facility])    COVID-19    Lung nodules    Excessive daytime sleepiness    Aneurysm of ascending aorta without rupture (HCC)    Sacroiliitis (HCC)       Past Medical History:   Diagnosis Date    Diabetes ([address] [facility])     Essential tremor     Hyperlipidemia     Hypertension     Obstructive sleep apnea     Paroxysmal atrial fibrillation (HCC)     Sick sinus syndrome [facility])        Past Surgical History:   Procedure Laterality Date    CT NEEDLE BIOPSY LUNG  2021    CT NEEDLE BIOPSY LUNG  2021       History reviewed. No pertinent family history.     Social History     Occupational History    Not on file   Tobacco Use    Smoking status: Former     Packs/day: 2.00     Years: 7.00     Total pack years: 14.00     Types: Cigarettes     Start date:      Quit date: 1965     Years since quittin.5    Smokeless tobacco: Never   Vaping Use    Vaping Use: Never used   Substance and Sexual Activity    Alcohol use: Not Currently    Drug use: Never    Sexual activity: Not on file       Current Outpatient Medications on File Prior to Visit   Medication Sig    acetaminophen (TYLENOL) 500 mg tablet Take 500 mg by mouth every 6 (six) hours as needed    albuterol (PROVENTIL HFA,VENTOLIN HFA) 90 mcg/act inhaler Inhale 2 puffs every 6 (six) hours as needed    allopurinol (ZYLOPRIM) 100 mg tablet Take 100 mg by mouth daily    Ascorbic Acid (VITAMIN C PO) Take 2 tablets by mouth every morning    bisacodyl (DULCOLAX) 5 mg EC tablet Take 1 tablet (5 mg total) by mouth daily as needed for constipation    CHOLECALCIFEROL PO Take 2 tablets by mouth every morning    colchicine (COLCRYS) 0.6 mg tablet Take 0.6 mg by mouth 2 (two) times a day    cyanocobalamin (VITAMIN B-12) 1000 MCG tablet Take 1,000 mcg by mouth    dabigatran etexilate (PRADAXA) 150 mg capsu Take 1 capsule (150 mg total) by mouth every 12 (twelve) hours    FERROUS FUMARATE PO Take 1 tablet by mouth every morning    furosemide (LASIX) 20 mg tablet Take 20 mg by mouth daily    insulin degludec (TRESIBA) 100 units/mL injection pen Inject 80 Units under the skin    levothyroxine 112 mcg tablet     lisinopril (ZESTRIL) 5 mg tablet     loratadine (CLARITIN) 10 mg tablet Take 10 mg by mouth daily    metoprolol succinate (TOPROL-XL) 50 mg 24 hr tablet Take 1 tablet (50 mg total) by mouth daily Do not start before October 31, 2022. pantoprazole (PROTONIX) 40 mg tablet Take 1 tablet (40 mg total) by mouth daily    polyethylene glycol (MIRALAX) 17 g packet Take 17 g by mouth daily    tamsulosin (FLOMAX) 0.4 mg Take 1 capsule (0.4 mg total) by mouth daily with dinner    azelastine (ASTELIN) 0.1 % nasal spray 1 spray into each nostril 2 (two) times a day Use in each nostril as directed    ezetimibe (ZETIA) 10 mg tablet Take 10 mg by mouth daily    ipratropium (ATROVENT) 0.03 % nasal spray 2 sprays into each nostril every 12 (twelve) hours     No current facility-administered medications on file prior to visit.        Allergies   Allergen Reactions    Aspirin GI Bleeding and Other (See Comments)    Sitagliptin Hives and Rash    Canagliflozin Rash and Hives     Burning with urination and kidneys started to fail       Physical Exam:    /81   Ht 5' 10" (1.778 m)   Wt (!) 142 kg (314 lb)   BMI 45.05 kg/m²     Constitutional:normal, well developed, well nourished, alert, in no distress and non-toxic and no overt pain behavior. Eyes:anicteric  HEENT:grossly intact  Neck:supple, symmetric, trachea midline and no masses   Pulmonary:even and unlabored  Cardiovascular:No edema or pitting edema present  Skin:Normal without rashes or lesions and well hydrated  Psychiatric:Mood and affect appropriate  Neurologic: No focal neurologic deficits  Musculoskeletal: Gait is slow. Ambulates with walker. Less pronounced tenderness over the right SIJ. Pronounced tenderness over the right lateral hip overlying the greater trochanter. Imaging  RIGHT HIP     INDICATION:   M25.551: Pain in right hip. COMPARISON:  None     VIEWS:  XR HIP/PELV 2-3 VWS RIGHT W PELVIS IF PERFORMED  Images: 3     FINDINGS: Right-sided total hip arthroplasty noted. No evidence for hardware complication. There is no acute fracture or dislocation. Mild left hip osteoarthritis is seen. No lytic or blastic osseous lesion. Calcified vas deferens noted bilaterally, a finding associated with diabetes mellitus. Marked degenerative changes visualized lower lumbar spine. IMPRESSION:     Unremarkable appearance of right total hip arthroplasty. Degenerative changes noted in the lumbar spine and left hip.

## 2023-11-08 ENCOUNTER — HOSPITAL ENCOUNTER (OUTPATIENT)
Dept: RADIOLOGY | Facility: IMAGING CENTER | Age: 83
Discharge: HOME | End: 2023-11-08
Payer: MEDICARE

## 2023-11-08 DIAGNOSIS — R91.8 LUNG NODULES: ICD-10-CM

## 2023-11-08 PROCEDURE — 71250 CT THORAX DX C-: CPT

## 2023-11-08 PROCEDURE — G1004 CDSM NDSC: HCPCS

## 2023-11-30 ENCOUNTER — TELEPHONE (OUTPATIENT)
Age: 83
End: 2023-11-30

## 2023-11-30 ENCOUNTER — HOSPITAL ENCOUNTER (OUTPATIENT)
Dept: RADIOLOGY | Facility: MEDICAL CENTER | Age: 83
End: 2023-11-30
Payer: MEDICARE

## 2023-11-30 VITALS
OXYGEN SATURATION: 93 % | TEMPERATURE: 98.3 F | HEART RATE: 76 BPM | RESPIRATION RATE: 20 BRPM | SYSTOLIC BLOOD PRESSURE: 154 MMHG | DIASTOLIC BLOOD PRESSURE: 85 MMHG

## 2023-11-30 DIAGNOSIS — M70.61 GREATER TROCHANTERIC BURSITIS OF RIGHT HIP: ICD-10-CM

## 2023-11-30 PROCEDURE — 77002 NEEDLE LOCALIZATION BY XRAY: CPT | Performed by: ANESTHESIOLOGY

## 2023-11-30 PROCEDURE — 20610 DRAIN/INJ JOINT/BURSA W/O US: CPT | Performed by: ANESTHESIOLOGY

## 2023-11-30 PROCEDURE — 77002 NEEDLE LOCALIZATION BY XRAY: CPT

## 2023-11-30 RX ORDER — BUPIVACAINE HCL/PF 2.5 MG/ML
3 VIAL (ML) INJECTION ONCE
Status: COMPLETED | OUTPATIENT
Start: 2023-11-30 | End: 2023-11-30

## 2023-11-30 RX ORDER — LIDOCAINE HYDROCHLORIDE 10 MG/ML
3 INJECTION, SOLUTION EPIDURAL; INFILTRATION; INTRACAUDAL; PERINEURAL ONCE
Status: COMPLETED | OUTPATIENT
Start: 2023-11-30 | End: 2023-11-30

## 2023-11-30 RX ORDER — METHYLPREDNISOLONE ACETATE 40 MG/ML
40 INJECTION, SUSPENSION INTRA-ARTICULAR; INTRALESIONAL; INTRAMUSCULAR; PARENTERAL; SOFT TISSUE ONCE
Status: COMPLETED | OUTPATIENT
Start: 2023-11-30 | End: 2023-11-30

## 2023-11-30 RX ADMIN — LIDOCAINE HYDROCHLORIDE 3 ML: 10 INJECTION, SOLUTION EPIDURAL; INFILTRATION; INTRACAUDAL; PERINEURAL at 10:50

## 2023-11-30 RX ADMIN — Medication 3 ML: at 10:52

## 2023-11-30 RX ADMIN — IOHEXOL 1 ML: 300 INJECTION, SOLUTION INTRAVENOUS at 10:52

## 2023-11-30 RX ADMIN — METHYLPREDNISOLONE ACETATE 40 MG: 40 INJECTION, SUSPENSION INTRA-ARTICULAR; INTRALESIONAL; INTRAMUSCULAR; PARENTERAL; SOFT TISSUE at 10:52

## 2023-11-30 NOTE — H&P
History of Present Illness:  The patient is a 82 y.o. male who presents with complaints of hip pain    Past Medical History:   Diagnosis Date    Diabetes ([address])     Essential tremor     Hyperlipidemia     Hypertension     Obstructive sleep apnea     Paroxysmal atrial fibrillation (HCC)     Sick sinus syndrome [facility])        Past Surgical History:   Procedure Laterality Date    CT NEEDLE BIOPSY LUNG  2/4/2021    CT NEEDLE BIOPSY LUNG  2/4/2021         Current Outpatient Medications:     acetaminophen (TYLENOL) 500 mg tablet, Take 500 mg by mouth every 6 (six) hours as needed, Disp: , Rfl:     albuterol (PROVENTIL HFA,VENTOLIN HFA) 90 mcg/act inhaler, Inhale 2 puffs every 6 (six) hours as needed, Disp: , Rfl:     allopurinol (ZYLOPRIM) 100 mg tablet, Take 100 mg by mouth daily, Disp: , Rfl:     Ascorbic Acid (VITAMIN C PO), Take 2 tablets by mouth every morning, Disp: , Rfl:     azelastine (ASTELIN) 0.1 % nasal spray, 1 spray into each nostril 2 (two) times a day Use in each nostril as directed, Disp: 5 mL, Rfl: 3    bisacodyl (DULCOLAX) 5 mg EC tablet, Take 1 tablet (5 mg total) by mouth daily as needed for constipation, Disp: 60 tablet, Rfl: 5    CHOLECALCIFEROL PO, Take 2 tablets by mouth every morning, Disp: , Rfl:     colchicine (COLCRYS) 0.6 mg tablet, Take 0.6 mg by mouth 2 (two) times a day, Disp: , Rfl:     cyanocobalamin (VITAMIN B-12) 1000 MCG tablet, Take 1,000 mcg by mouth, Disp: , Rfl:     dabigatran etexilate (PRADAXA) 150 mg capsu, Take 1 capsule (150 mg total) by mouth every 12 (twelve) hours, Disp: , Rfl: 0    ezetimibe (ZETIA) 10 mg tablet, Take 10 mg by mouth daily, Disp: , Rfl:     FERROUS FUMARATE PO, Take 1 tablet by mouth every morning, Disp: , Rfl:     furosemide (LASIX) 20 mg tablet, Take 20 mg by mouth daily, Disp: , Rfl:     insulin degludec (TRESIBA) 100 units/mL injection pen, Inject 80 Units under the skin, Disp: , Rfl:     ipratropium (ATROVENT) 0.03 % nasal spray, 2 sprays into each nostril every 12 (twelve) hours, Disp: 10 mL, Rfl: 1    levothyroxine 112 mcg tablet, , Disp: , Rfl:     lisinopril (ZESTRIL) 5 mg tablet, , Disp: , Rfl:     loratadine (CLARITIN) 10 mg tablet, Take 10 mg by mouth daily, Disp: , Rfl:     metoprolol succinate (TOPROL-XL) 50 mg 24 hr tablet, Take 1 tablet (50 mg total) by mouth daily Do not start before October 31, 2022., Disp: , Rfl: 0    pantoprazole (PROTONIX) 40 mg tablet, Take 1 tablet (40 mg total) by mouth daily, Disp: 30 tablet, Rfl: 2    polyethylene glycol (MIRALAX) 17 g packet, Take 17 g by mouth daily, Disp: 1700 g, Rfl: 3    tamsulosin (FLOMAX) 0.4 mg, Take 1 capsule (0.4 mg total) by mouth daily with dinner, Disp: , Rfl: 0    Current Facility-Administered Medications:     bupivacaine (PF) (MARCAINE) 0.25 % injection 3 mL, 3 mL, Intra-articular, Once, Will Vidhi Quintanilla MD    iohexol (OMNIPAQUE) 300 mg/mL injection 1 mL, 1 mL, Intra-articular, Once, Will Vidhi Quintanilla MD    lidocaine (PF) (XYLOCAINE-MPF) 1 % injection 3 mL, 3 mL, Infiltration, Once, Will Vidhi Quintanilla MD    methylPREDNISolone acetate (DEPO-MEDROL) injection 40 mg, 40 mg, Intra-articular, Once, Will Vidhi Quintanilla MD    Allergies   Allergen Reactions    Aspirin GI Bleeding and Other (See Comments)    Sitagliptin Hives and Rash    Canagliflozin Rash and Hives     Burning with urination and kidneys started to fail       Physical Exam:   Vitals:    11/30/23 1024   BP: 125/77   Pulse: 85   Resp: 18   Temp: 98.3 °F (36.8 °C)   SpO2: 91%       General: Awake, Alert, Oriented x 3, Mood and affect appropriate  Respiratory: Respirations even and unlabored  Cardiovascular: Peripheral pulses intact; no edema  Musculoskeletal Exam: TTP R lateral hip    ASA Score: 3    Patient/Chart Verification  Patient ID Verified: Verbal  ID Band Applied: No  Consents Confirmed: Procedural  H&P( within 30 days) Verified:  To be obtained in the Pre-Procedure area  Interval H&P(within 24 hr) Complete (required for Outpatients and Surgery Admit only): To be obtained in the Pre-Procedure area  Allergies Reviewed: Yes  Anticoag/NSAID held?: NA  Currently on antibiotics?: No    Assessment:   1.  Greater trochanteric bursitis of right hip        Plan: right hip bursa injection w/ fluoro

## 2023-11-30 NOTE — DISCHARGE INSTRUCTIONS

## 2023-11-30 NOTE — TELEPHONE ENCOUNTER
Caller: [facility]     Doctor: Dr Slater     Reason for call: Facility called stating patient is on his way transportation system was not working at facility and was not able to  patient on time.     Call back#: [phone]

## 2023-12-07 ENCOUNTER — TELEPHONE (OUTPATIENT)
Dept: PAIN MEDICINE | Facility: CLINIC | Age: 83
End: 2023-12-07

## 2023-12-15 ENCOUNTER — APPOINTMENT (OUTPATIENT)
Dept: PULMONOLOGY | Facility: HOSPITAL | Age: 83
End: 2023-12-15
Payer: MEDICARE

## 2023-12-15 ENCOUNTER — HOSPITAL ENCOUNTER (OUTPATIENT)
Dept: NON INVASIVE DIAGNOSTICS | Facility: HOSPITAL | Age: 83
Discharge: HOME | End: 2023-12-15
Payer: MEDICARE

## 2023-12-15 VITALS
DIASTOLIC BLOOD PRESSURE: 85 MMHG | HEART RATE: 76 BPM | SYSTOLIC BLOOD PRESSURE: 154 MMHG | WEIGHT: 314 LBS | HEIGHT: 70 IN | BODY MASS INDEX: 44.95 KG/M2

## 2023-12-15 DIAGNOSIS — R00.0 TACHYCARDIA: ICD-10-CM

## 2023-12-15 LAB
AORTIC ROOT: 3.8 CM
APICAL FOUR CHAMBER EJECTION FRACTION: 59 %
ASCENDING AORTA: 3.5 CM
DOP CALC LVOT AREA: 3.8 CM2
DOP CALC LVOT DIAMETER: 2.2 CM
FRACTIONAL SHORTENING: 31 (ref 28–44)
INTERVENTRICULAR SEPTUM IN DIASTOLE (PARASTERNAL SHORT AXIS VIEW): 1.1 CM
INTERVENTRICULAR SEPTUM: 1.1 CM (ref 0.6–1.1)
LAAS-AP2: 23.4 CM2
LAAS-AP4: 25.1 CM2
LEFT ATRIUM SIZE: 4.7 CM
LEFT ATRIUM VOLUME (MOD BIPLANE): 79 ML
LEFT ATRIUM VOLUME INDEX (MOD BIPLANE): 31.2 ML/M2
LEFT INTERNAL DIMENSION IN SYSTOLE: 2.9 CM (ref 2.1–4)
LEFT VENTRICULAR INTERNAL DIMENSION IN DIASTOLE: 4.2 CM (ref 3.5–6)
LEFT VENTRICULAR POSTERIOR WALL IN END DIASTOLE: 1.1 CM
LEFT VENTRICULAR STROKE VOLUME: 46 ML
LVSV (TEICH): 46 ML
MV E'TISSUE VEL-SEP: 14 CM/S
RIGHT ATRIUM AREA SYSTOLE A4C: 20.1 CM2
RIGHT VENTRICLE ID DIMENSION: 3.9 CM
SL CV LEFT ATRIUM LENGTH A2C: 6 CM
SL CV LV EF: 60
SL CV PED ECHO LEFT VENTRICLE DIASTOLIC VOLUME (MOD BIPLANE) 2D: 79 ML
SL CV PED ECHO LEFT VENTRICLE SYSTOLIC VOLUME (MOD BIPLANE) 2D: 33 ML
TRICUSPID ANNULAR PLANE SYSTOLIC EXCURSION: 2.3 CM

## 2023-12-15 PROCEDURE — 93306 TTE W/DOPPLER COMPLETE: CPT

## 2023-12-15 PROCEDURE — 93306 TTE W/DOPPLER COMPLETE: CPT | Performed by: INTERNAL MEDICINE

## 2023-12-29 ENCOUNTER — OFFICE VISIT (OUTPATIENT)
Dept: PAIN MEDICINE | Facility: CLINIC | Age: 83
End: 2023-12-29
Payer: MEDICARE

## 2023-12-29 VITALS — BODY MASS INDEX: 44.95 KG/M2 | WEIGHT: 314 LBS | HEIGHT: 70 IN

## 2023-12-29 DIAGNOSIS — M70.61 GREATER TROCHANTERIC BURSITIS OF RIGHT HIP: Primary | ICD-10-CM

## 2023-12-29 DIAGNOSIS — Z98.890 S/P LUMBAR LAMINECTOMY: ICD-10-CM

## 2023-12-29 DIAGNOSIS — M25.561 CHRONIC PAIN OF RIGHT KNEE: ICD-10-CM

## 2023-12-29 DIAGNOSIS — M53.3 SACROILIAC DYSFUNCTION: ICD-10-CM

## 2023-12-29 DIAGNOSIS — G89.29 CHRONIC KNEE PAIN AFTER TOTAL REPLACEMENT OF RIGHT KNEE JOINT: ICD-10-CM

## 2023-12-29 DIAGNOSIS — G89.29 CHRONIC PAIN OF RIGHT KNEE: ICD-10-CM

## 2023-12-29 DIAGNOSIS — M25.561 CHRONIC KNEE PAIN AFTER TOTAL REPLACEMENT OF RIGHT KNEE JOINT: ICD-10-CM

## 2023-12-29 DIAGNOSIS — Z96.651 S/P TKR (TOTAL KNEE REPLACEMENT), RIGHT: ICD-10-CM

## 2023-12-29 DIAGNOSIS — Z96.651 CHRONIC KNEE PAIN AFTER TOTAL REPLACEMENT OF RIGHT KNEE JOINT: ICD-10-CM

## 2023-12-29 PROCEDURE — 99214 OFFICE O/P EST MOD 30 MIN: CPT | Performed by: ANESTHESIOLOGY

## 2023-12-29 NOTE — PROGRESS NOTES
Assessment:  1. Greater trochanteric bursitis of right hip    2. Sacroiliac dysfunction    3. S/P lumbar laminectomy    4. S/P TKR (total knee replacement), right    5. Chronic knee pain after total replacement of right knee joint    6. Chronic pain of right knee        Plan:    Patient presenting for follow up visit. He has a history of chronic right sided lateral back/hip pain radiating to the buttock, lateral hip and posterior thigh for >1 year.     Pain is consistent with right sacroiliac joint dysfunction, right greater trochanteric bursitis and accompanied by pain >7/10 on the pain scale with inability to participate in IADLs for >6 weeks. Patient has fully participated with physical therapy in the past with no benefit.  Has been taking oxycodone, Lidocaine patches, Tylenol with modest benefit.  Denies any bowel or bladder incontinence, saddle anesthesia.     Reviewed and interpreted relevant imaging studies - specifically lumbar and hip XRs and discussed the results and clinical significance with the patient. This shows multilevel extensive facet arthropathy in the lumbar spine. Hip/pelvis x-rays show stable right total hip arthroplasty.     Patient reports a history of bilateral laminectomies at L3-4 and L4-5 in 2016.  Subsequently he did have significant benefit with right L3 and L4 transforaminal LEANNE's at [facility] around 2020.      After R SIJ injection on 10/5/23, he noticed significant improvement of his lower lumbar pain but continues to have more lateral pain. He did have significant tenderness overlying the greater trochanter.    After right greater troch bursa injection on 11/30/23, he has noticed almost complete resolution of his residual lateral hip symptoms.    Patient made aware he can follow up anytime for repeat SIJ or hip bursa injections.    He is endorsing worsening right knee pain. He has a prior total knee replacement. Unfortunately genicular nerve blocks are now deemed experimental is not  something I can offer.  Will place referral to Orthopedic surgery     Reviewed external notes from the relevant aspects of the patient's medical record, specifically orthopedic sports medicine, primary care physician notes in regards to current and prior treatments tried (as mentioned in history of present illness).     Reviewed pertinent laboratory studies, specifically renal function, hemoglobin A1c, CBC, coagulation studies, prior to recommending medication therapies/interventional treatment options.     [place] Prescription Drug Monitoring Program report was reviewed and was appropriate     My impressions and treatment recommendations were discussed in detail with the patient who verbalized understanding and had no further questions.  Discharge instructions were provided. I personally saw and examined the patient and I agree with the above discussed plan of care.    Orders Placed This Encounter   Procedures    Ambulatory referral to Orthopedic Surgery     Standing Status:   Future     Standing Expiration Date:   12/29/2024     Referral Priority:   Routine     Referral Type:   Consult - AMB     Referral Reason:   Specialty Services Required     Requested Specialty:   Orthopedic Surgery     Number of Visits Requested:   1     Expiration Date:   12/29/2024     No orders of the defined types were placed in this encounter.      History of Present Illness:  Lyndon Chairez is a 83 y.o. male who presents for a follow up office visit in regards to right hip pain. The patient’s current symptoms include improved right hip pain symptoms. Pain is rated 1-2/10 and described as an occasional cramping pain.    I have personally reviewed and/or updated the patient's past medical history, past surgical history, family history, social history, current medications, allergies, and vital signs today.     Review of Systems   Constitutional:  Negative for chills and fever.   HENT:  Negative for ear pain and sore throat.    Eyes:   Negative for pain and visual disturbance.   Respiratory:  Negative for cough and shortness of breath.    Cardiovascular:  Negative for chest pain and palpitations.   Gastrointestinal:  Negative for abdominal pain and vomiting.   Genitourinary:  Negative for dysuria and hematuria.   Musculoskeletal:  Positive for gait problem. Negative for arthralgias and back pain.   Skin:  Negative for color change and rash.   Neurological:  Negative for seizures and syncope.   All other systems reviewed and are negative.      Patient Active Problem List   Diagnosis    Obesity, morbid (HCC)    Atypical chest pain    Dysphagia    Sick sinus syndrome (HCC)    Paroxysmal atrial fibrillation (HCC)    BPH (benign prostatic hyperplasia)    Type 2 diabetes mellitus (HCC)    COVID-19    Lung nodules    Excessive daytime sleepiness    Aneurysm of ascending aorta without rupture (HCC)    Sacroiliitis (HCC)    Greater trochanteric bursitis of right hip       Past Medical History:   Diagnosis Date    Diabetes (HCC)     Essential tremor     Hyperlipidemia     Hypertension     Obstructive sleep apnea     Paroxysmal atrial fibrillation (HCC)     Sick sinus syndrome (HCC)        Past Surgical History:   Procedure Laterality Date    CT NEEDLE BIOPSY LUNG  2021    CT NEEDLE BIOPSY LUNG  2021       History reviewed. No pertinent family history.    Social History     Occupational History    Not on file   Tobacco Use    Smoking status: Former     Current packs/day: 0.00     Average packs/day: 2.0 packs/day for 7.0 years (14.0 ttl pk-yrs)     Types: Cigarettes     Start date:      Quit date: 1965     Years since quittin.0    Smokeless tobacco: Never   Vaping Use    Vaping status: Never Used   Substance and Sexual Activity    Alcohol use: Not Currently    Drug use: Never    Sexual activity: Not on file       Current Outpatient Medications on File Prior to Visit   Medication Sig    acetaminophen (TYLENOL) 500 mg tablet Take 500 mg by  mouth every 6 (six) hours as needed    albuterol (PROVENTIL HFA,VENTOLIN HFA) 90 mcg/act inhaler Inhale 2 puffs every 6 (six) hours as needed    allopurinol (ZYLOPRIM) 100 mg tablet Take 100 mg by mouth daily    Ascorbic Acid (VITAMIN C PO) Take 2 tablets by mouth every morning    bisacodyl (DULCOLAX) 5 mg EC tablet Take 1 tablet (5 mg total) by mouth daily as needed for constipation    CHOLECALCIFEROL PO Take 2 tablets by mouth every morning    colchicine (COLCRYS) 0.6 mg tablet Take 0.6 mg by mouth 2 (two) times a day    cyanocobalamin (VITAMIN B-12) 1000 MCG tablet Take 1,000 mcg by mouth    dabigatran etexilate (PRADAXA) 150 mg capsu Take 1 capsule (150 mg total) by mouth every 12 (twelve) hours    FERROUS FUMARATE PO Take 1 tablet by mouth every morning    furosemide (LASIX) 20 mg tablet Take 20 mg by mouth daily    insulin degludec (TRESIBA) 100 units/mL injection pen Inject 80 Units under the skin    levothyroxine 112 mcg tablet     lisinopril (ZESTRIL) 5 mg tablet     loratadine (CLARITIN) 10 mg tablet Take 10 mg by mouth daily    metoprolol succinate (TOPROL-XL) 50 mg 24 hr tablet Take 1 tablet (50 mg total) by mouth daily Do not start before October 31, 2022.    pantoprazole (PROTONIX) 40 mg tablet Take 1 tablet (40 mg total) by mouth daily    polyethylene glycol (MIRALAX) 17 g packet Take 17 g by mouth daily    tamsulosin (FLOMAX) 0.4 mg Take 1 capsule (0.4 mg total) by mouth daily with dinner    azelastine (ASTELIN) 0.1 % nasal spray 1 spray into each nostril 2 (two) times a day Use in each nostril as directed    ezetimibe (ZETIA) 10 mg tablet Take 10 mg by mouth daily    ipratropium (ATROVENT) 0.03 % nasal spray 2 sprays into each nostril every 12 (twelve) hours     No current facility-administered medications on file prior to visit.       Allergies   Allergen Reactions    Aspirin GI Bleeding and Other (See Comments)    Sitagliptin Hives and Rash    Canagliflozin Rash and Hives     Burning with  "urination and kidneys started to fail       Physical Exam:    Ht 5' 10\" (1.778 m)   Wt (!) 142 kg (314 lb)   BMI 45.05 kg/m²     Constitutional:normal, well developed, well nourished, alert, in no distress and non-toxic and no overt pain behavior.  Eyes:anicteric  HEENT:grossly intact  Neck:supple, symmetric, trachea midline and no masses   Pulmonary:even and unlabored  Cardiovascular:No edema or pitting edema present  Skin:Normal without rashes or lesions and well hydrated  Psychiatric:Mood and affect appropriate  Neurologic: Motor function is grossly intact with no focal neurologic deficits   Musculoskeletal: Gait is antalgic. Use of walker.    Imaging    "

## 2024-01-04 ENCOUNTER — APPOINTMENT (OUTPATIENT)
Dept: RADIOLOGY | Facility: MEDICAL CENTER | Age: 84
End: 2024-01-04
Payer: MEDICARE

## 2024-01-04 ENCOUNTER — OFFICE VISIT (OUTPATIENT)
Dept: OBGYN CLINIC | Facility: MEDICAL CENTER | Age: 84
End: 2024-01-04
Payer: MEDICARE

## 2024-01-04 VITALS
DIASTOLIC BLOOD PRESSURE: 75 MMHG | BODY MASS INDEX: 44.95 KG/M2 | HEIGHT: 70 IN | SYSTOLIC BLOOD PRESSURE: 138 MMHG | HEART RATE: 80 BPM | WEIGHT: 314 LBS

## 2024-01-04 DIAGNOSIS — M25.561 CHRONIC PAIN OF RIGHT KNEE: ICD-10-CM

## 2024-01-04 DIAGNOSIS — G89.29 CHRONIC PAIN OF RIGHT KNEE: ICD-10-CM

## 2024-01-04 DIAGNOSIS — Z96.651 S/P TKR (TOTAL KNEE REPLACEMENT), RIGHT: ICD-10-CM

## 2024-01-04 DIAGNOSIS — Z96.651 HISTORY OF TOTAL KNEE ARTHROPLASTY, RIGHT: ICD-10-CM

## 2024-01-04 DIAGNOSIS — Z01.89 ENCOUNTER FOR LOWER EXTREMITY COMPARISON IMAGING STUDY: Primary | ICD-10-CM

## 2024-01-04 PROCEDURE — 73564 X-RAY EXAM KNEE 4 OR MORE: CPT

## 2024-01-04 PROCEDURE — 99214 OFFICE O/P EST MOD 30 MIN: CPT | Performed by: STUDENT IN AN ORGANIZED HEALTH CARE EDUCATION/TRAINING PROGRAM

## 2024-01-04 PROCEDURE — 73560 X-RAY EXAM OF KNEE 1 OR 2: CPT

## 2024-01-04 NOTE — PROGRESS NOTES
Knee New Office Note    Assessment:     1. Encounter for lower extremity comparison imaging study    2. History of total knee arthroplasty, right    3. Chronic pain of right knee        Plan:     Problem List Items Addressed This Visit    None  Visit Diagnoses       Encounter for lower extremity comparison imaging study    -  Primary    History of total knee arthroplasty, right        Relevant Orders    XR knee 1 or 2 vw left    Sedimentation rate, automated    C-reactive protein    NM bone scan 3 phase    Chronic pain of right knee        Relevant Orders    XR knee 4+ vw right injury    XR bone length (scanogram)    Sedimentation rate, automated    C-reactive protein    NM bone scan 3 phase           Findings today are consistent with right knee pain with history of right total knee arthroplasty performed by Dr. Holden roughly 14 years ago. Imaging and prognosis was reviewed with the patient today. Blood work ordered to rule out infection due to previously pain-free TKA in the past. Bone scan ordered to rule out loosening. Follow up after testing is complete.    Subjective:     Patient ID: Lyndon Chairez is a 83 y.o. male.  Chief Complaint:  HPI:  83 y.o. male presents to the office for evaluation of right knee pain. He has history of right total knee arthroplasty performed by Dr. Holden roughly 14 years ago. He is experiencing anterior right knee pain made worse with activities including ambulation. He reports that he previously had a well-functioning arthroplasty without pain.  The pain has been present for the last few months.  He has diffuse tenderness palpation around the knee. He has also been treated with Dr. Slater for his right hip greater trochanteric bursa with some relief. He denies any numbness or tingling.    Allergy:  Allergies   Allergen Reactions    Aspirin GI Bleeding and Other (See Comments)    Sitagliptin Hives and Rash    Canagliflozin Rash and Hives     Burning with urination and kidneys  started to fail     Medications:  all current active meds have been reviewed  Past Medical History:  Past Medical History:   Diagnosis Date    Diabetes (HCC)     Essential tremor     Hyperlipidemia     Hypertension     Obstructive sleep apnea     Paroxysmal atrial fibrillation (HCC)     Sick sinus syndrome (HCC)      Past Surgical History:  Past Surgical History:   Procedure Laterality Date    CT NEEDLE BIOPSY LUNG  2021    CT NEEDLE BIOPSY LUNG  2021     Family History:  History reviewed. No pertinent family history.  Social History:  Social History     Substance and Sexual Activity   Alcohol Use Not Currently     Social History     Substance and Sexual Activity   Drug Use Never     Social History     Tobacco Use   Smoking Status Former    Current packs/day: 0.00    Average packs/day: 2.0 packs/day for 7.0 years (14.0 ttl pk-yrs)    Types: Cigarettes    Start date:     Quit date:     Years since quittin.0   Smokeless Tobacco Never           ROS:  General: Per HPI  Skin: Negative, except if noted below  HEENT: Negative  Respiratory: Negative  Cardiovascular: Negative  Gastrointestinal: Negative  Urinary: Negative  Vascular: Negative  Musculoskeletal: Positive per HPI   Neurologic: Positive per HPI  Endocrine: Negative    Objective:  BP Readings from Last 1 Encounters:   24 138/75      Wt Readings from Last 1 Encounters:   24 (!) 142 kg (314 lb)        Respiratory:   non-labored respirations    Lymphatics:  no palpable lymph nodes    Gait:   Ambulates with the assistance of a walker    Neurologic:   Alert and oriented times 3  Patient with normal sensation except as noted below  Deep tendon reflexes 2+ except as noted in MSK exam    Bilateral Lower Extremity:    Right knee:     Inspection:  well healed total knee incisions    Overall limb alignment neutral    Effusion: none    ROM 5-105 with pain    Extensor Lag: none    Palpation: diffuse tenderness to palpation    AP Stability at 90  "deg stable    M/L stability in full extension stable    M/L stability in midflexion stable    Motor: 5/5 Q/HS/TA/GS/P    Pulses: 2+ DP / 2+ PT    SILT DP/SP/S/S/TN    Imaging:  My interpretation XR AP scanogram/AP bilateral knee/lateral/pavon/sunrise right knee: s/p cemented PS Nexgen total knee arthroplasty, components in good position. He has a radiolucent line around the cement of the box of the femoral component without definitive signs of loosening.     BMI:   Estimated body mass index is 45.05 kg/m² as calculated from the following:    Height as of this encounter: 5' 10\" (1.778 m).    Weight as of this encounter: 142 kg (314 lb).  BSA:   Estimated body surface area is 2.53 meters squared as calculated from the following:    Height as of this encounter: 5' 10\" (1.778 m).    Weight as of this encounter: 142 kg (314 lb).           Scribe Attestation      I,:  Arline Donis am acting as a scribe while in the presence of the attending physician.:       I,:  Iván Silva, DO personally performed the services described in this documentation    as scribed in my presence.:              "

## 2024-01-15 ENCOUNTER — HOSPITAL ENCOUNTER (OUTPATIENT)
Dept: RADIOLOGY | Facility: HOSPITAL | Age: 84
Discharge: HOME | End: 2024-01-15
Attending: STUDENT IN AN ORGANIZED HEALTH CARE EDUCATION/TRAINING PROGRAM
Payer: MEDICARE

## 2024-01-15 DIAGNOSIS — M25.561 CHRONIC PAIN OF RIGHT KNEE: ICD-10-CM

## 2024-01-15 DIAGNOSIS — Z96.651 HISTORY OF TOTAL KNEE ARTHROPLASTY, RIGHT: ICD-10-CM

## 2024-01-15 DIAGNOSIS — G89.29 CHRONIC PAIN OF RIGHT KNEE: ICD-10-CM

## 2024-01-15 PROCEDURE — A9503 TC99M MEDRONATE: HCPCS

## 2024-01-15 PROCEDURE — G1004 CDSM NDSC: HCPCS

## 2024-01-15 PROCEDURE — 78315 BONE IMAGING 3 PHASE: CPT

## 2024-01-17 ENCOUNTER — LAB REQUISITION (OUTPATIENT)
Dept: LAB | Facility: HOSPITAL | Age: 84
End: 2024-01-17
Payer: MEDICARE

## 2024-01-17 DIAGNOSIS — I10 ESSENTIAL (PRIMARY) HYPERTENSION: ICD-10-CM

## 2024-01-17 LAB
ANION GAP SERPL CALCULATED.3IONS-SCNC: 10 MMOL/L
BASOPHILS # BLD AUTO: 0.03 THOUSANDS/ÂΜL (ref 0–0.1)
BASOPHILS NFR BLD AUTO: 0 % (ref 0–1)
BUN SERPL-MCNC: 22 MG/DL (ref 5–25)
CALCIUM SERPL-MCNC: 9.3 MG/DL (ref 8.4–10.2)
CHLORIDE SERPL-SCNC: 101 MMOL/L (ref 96–108)
CO2 SERPL-SCNC: 25 MMOL/L (ref 21–32)
CREAT SERPL-MCNC: 1.1 MG/DL (ref 0.6–1.3)
CRP SERPL QL: 15.7 MG/L
EOSINOPHIL # BLD AUTO: 0.2 THOUSAND/ÂΜL (ref 0–0.61)
EOSINOPHIL NFR BLD AUTO: 2 % (ref 0–6)
ERYTHROCYTE [DISTWIDTH] IN BLOOD BY AUTOMATED COUNT: 15.1 % (ref 11.6–15.1)
ERYTHROCYTE [SEDIMENTATION RATE] IN BLOOD: 48 MM/HOUR (ref 0–19)
GFR SERPL CREATININE-BSD FRML MDRD: 62 ML/MIN/1.73SQ M
GLUCOSE SERPL-MCNC: 207 MG/DL (ref 65–140)
HCT VFR BLD AUTO: 41.9 % (ref 36.5–49.3)
HGB BLD-MCNC: 13.3 G/DL (ref 12–17)
IMM GRANULOCYTES # BLD AUTO: 0.06 THOUSAND/UL (ref 0–0.2)
IMM GRANULOCYTES NFR BLD AUTO: 1 % (ref 0–2)
LYMPHOCYTES # BLD AUTO: 1.74 THOUSANDS/ÂΜL (ref 0.6–4.47)
LYMPHOCYTES NFR BLD AUTO: 16 % (ref 14–44)
MCH RBC QN AUTO: 28.3 PG (ref 26.8–34.3)
MCHC RBC AUTO-ENTMCNC: 31.7 G/DL (ref 31.4–37.4)
MCV RBC AUTO: 89 FL (ref 82–98)
MONOCYTES # BLD AUTO: 0.67 THOUSAND/ÂΜL (ref 0.17–1.22)
MONOCYTES NFR BLD AUTO: 6 % (ref 4–12)
NEUTROPHILS # BLD AUTO: 7.93 THOUSANDS/ÂΜL (ref 1.85–7.62)
NEUTS SEG NFR BLD AUTO: 75 % (ref 43–75)
NRBC BLD AUTO-RTO: 0 /100 WBCS
PLATELET # BLD AUTO: 237 THOUSANDS/UL (ref 149–390)
PMV BLD AUTO: 11.4 FL (ref 8.9–12.7)
POTASSIUM SERPL-SCNC: 4.4 MMOL/L (ref 3.5–5.3)
RBC # BLD AUTO: 4.7 MILLION/UL (ref 3.88–5.62)
SODIUM SERPL-SCNC: 136 MMOL/L (ref 135–147)
WBC # BLD AUTO: 10.63 THOUSAND/UL (ref 4.31–10.16)

## 2024-01-17 PROCEDURE — 86140 C-REACTIVE PROTEIN: CPT | Performed by: FAMILY MEDICINE

## 2024-01-17 PROCEDURE — 85652 RBC SED RATE AUTOMATED: CPT | Performed by: FAMILY MEDICINE

## 2024-01-17 PROCEDURE — 85025 COMPLETE CBC W/AUTO DIFF WBC: CPT | Performed by: FAMILY MEDICINE

## 2024-01-17 PROCEDURE — 80048 BASIC METABOLIC PNL TOTAL CA: CPT | Performed by: FAMILY MEDICINE

## 2024-02-06 ENCOUNTER — OFFICE VISIT (OUTPATIENT)
Dept: CARDIOLOGY CLINIC | Facility: CLINIC | Age: 84
End: 2024-02-06
Payer: MEDICARE

## 2024-02-06 VITALS
SYSTOLIC BLOOD PRESSURE: 132 MMHG | BODY MASS INDEX: 45.1 KG/M2 | DIASTOLIC BLOOD PRESSURE: 78 MMHG | HEIGHT: 70 IN | OXYGEN SATURATION: 96 % | WEIGHT: 315 LBS | HEART RATE: 77 BPM

## 2024-02-06 DIAGNOSIS — N18.2 CKD (CHRONIC KIDNEY DISEASE) STAGE 2, GFR 60-89 ML/MIN: ICD-10-CM

## 2024-02-06 DIAGNOSIS — I48.0 PAROXYSMAL ATRIAL FIBRILLATION (HCC): Primary | ICD-10-CM

## 2024-02-06 DIAGNOSIS — E66.01 OBESITY, MORBID (HCC): ICD-10-CM

## 2024-02-06 DIAGNOSIS — I35.0 NONRHEUMATIC AORTIC VALVE STENOSIS: ICD-10-CM

## 2024-02-06 DIAGNOSIS — Z79.4 TYPE 2 DIABETES MELLITUS WITH OTHER SPECIFIED COMPLICATION, WITH LONG-TERM CURRENT USE OF INSULIN (HCC): ICD-10-CM

## 2024-02-06 DIAGNOSIS — I47.29 NSVT (NONSUSTAINED VENTRICULAR TACHYCARDIA) (HCC): ICD-10-CM

## 2024-02-06 DIAGNOSIS — E78.5 DYSLIPIDEMIA: ICD-10-CM

## 2024-02-06 DIAGNOSIS — I49.5 SICK SINUS SYNDROME (HCC): ICD-10-CM

## 2024-02-06 DIAGNOSIS — I25.84 CORONARY ARTERY CALCIFICATION: ICD-10-CM

## 2024-02-06 DIAGNOSIS — E11.69 TYPE 2 DIABETES MELLITUS WITH OTHER SPECIFIED COMPLICATION, WITH LONG-TERM CURRENT USE OF INSULIN (HCC): ICD-10-CM

## 2024-02-06 DIAGNOSIS — G47.33 OSA (OBSTRUCTIVE SLEEP APNEA): ICD-10-CM

## 2024-02-06 DIAGNOSIS — I25.10 CORONARY ARTERY CALCIFICATION: ICD-10-CM

## 2024-02-06 DIAGNOSIS — I71.21 ANEURYSM OF ASCENDING AORTA WITHOUT RUPTURE (HCC): ICD-10-CM

## 2024-02-06 DIAGNOSIS — I10 HYPERTENSION, UNSPECIFIED TYPE: ICD-10-CM

## 2024-02-06 PROCEDURE — 93000 ELECTROCARDIOGRAM COMPLETE: CPT | Performed by: INTERNAL MEDICINE

## 2024-02-06 PROCEDURE — 99204 OFFICE O/P NEW MOD 45 MIN: CPT | Performed by: INTERNAL MEDICINE

## 2024-02-06 NOTE — PATIENT INSTRUCTIONS
You were seen today in the Cardiology office for atrial fibrillation. No medication changes were made today.  Please continue your current cardiac medications as prescribed.    Thank you for choosing [facility].

## 2024-02-20 ENCOUNTER — TELEPHONE (OUTPATIENT)
Age: 84
End: 2024-02-20

## 2024-02-25 NOTE — PROGRESS NOTES
[facility]    CHIEF COMPLAINT: No chief complaint on file.      HPI:  Lyndon Chairez is a 83 y.o. male with a past medical history of former smoker, hyperlipidemia, type 2 diabetes mellitus, thoracic aortic aneurysm, sick sinus syndrome status post PPM.    Patient reports shortness of breath with exertion which she feels is chronic.  He also reports chest pain which is noncardiac by description and on review of prior office visits notes is chronic in nature and located in the region of prior lung mass.    He denies fatigue, visual changes, lightheadedness, syncope, palpitations, PND.  He does report leg edema which she feels is at baseline.  He denies any dark or bright red blood in stools.  He does report sleeping upright but thinks this is due to his sleep apnea.    The following portions of the patient's history were reviewed and updated as appropriate: allergies, current medications, past family history, past medical history, past social history, past surgical history, and problem list.    SINCE LAST OV I REVIEWED WITH THE PATIENT THE INTERIM LABS, TEST RESULTS, CONSULTANT(S) NOTES AND PERFORMED AN INTERIM REVIEW OF HISTORY    Past Medical History:   Diagnosis Date    Diabetes (HCC)     Essential tremor     Hyperlipidemia     Hypertension     Obstructive sleep apnea     Paroxysmal atrial fibrillation (HCC)     Sick sinus syndrome (HCC)        Past Surgical History:   Procedure Laterality Date    CT NEEDLE BIOPSY LUNG  2/4/2021    CT NEEDLE BIOPSY LUNG  2/4/2021       Social History     Socioeconomic History    Marital status: Unknown     Spouse name: Not on file    Number of children: Not on file    Years of education: Not on file    Highest education level: Not on file   Occupational History    Not on file   Tobacco Use    Smoking status: Former     Current packs/day: 0.00     Average packs/day: 2.0 packs/day for 7.0 years (14.0 ttl pk-yrs)     Types: Cigarettes     Start date: 1958      Quit date: 1965     Years since quittin.1    Smokeless tobacco: Never   Vaping Use    Vaping status: Never Used   Substance and Sexual Activity    Alcohol use: Not Currently    Drug use: Never    Sexual activity: Not on file   Other Topics Concern    Not on file   Social History Narrative    Not on file     Social Determinants of Health     Financial Resource Strain: Not on file   Food Insecurity: Not on file   Transportation Needs: Not on file   Physical Activity: Not on file   Stress: Not on file   Social Connections: Not on file   Intimate Partner Violence: Not on file   Housing Stability: Unknown (10/28/2022)    Housing Stability Vital Sign     Unable to Pay for Housing in the Last Year: No     Number of Places Lived in the Last Year: Not on file     Unstable Housing in the Last Year: No       History reviewed. No pertinent family history.    Allergies   Allergen Reactions    Aspirin GI Bleeding and Other (See Comments)    Sitagliptin Hives and Rash    Canagliflozin Rash and Hives     Burning with urination and kidneys started to fail       Current Outpatient Medications   Medication Sig Dispense Refill    acetaminophen (TYLENOL) 500 mg tablet Take 500 mg by mouth every 6 (six) hours as needed      albuterol (PROVENTIL HFA,VENTOLIN HFA) 90 mcg/act inhaler Inhale 2 puffs every 6 (six) hours as needed      allopurinol (ZYLOPRIM) 100 mg tablet Take 100 mg by mouth daily      Ascorbic Acid (VITAMIN C PO) Take 2 tablets by mouth every morning      bisacodyl (DULCOLAX) 5 mg EC tablet Take 1 tablet (5 mg total) by mouth daily as needed for constipation 60 tablet 5    CHOLECALCIFEROL PO Take 2 tablets by mouth every morning      colchicine (COLCRYS) 0.6 mg tablet Take 0.6 mg by mouth 2 (two) times a day      cyanocobalamin (VITAMIN B-12) 1000 MCG tablet Take 1,000 mcg by mouth      dabigatran etexilate (PRADAXA) 150 mg capsu Take 1 capsule (150 mg total) by mouth every 12 (twelve) hours  0    FERROUS FUMARATE PO  "Take 1 tablet by mouth every morning      furosemide (LASIX) 20 mg tablet Take 20 mg by mouth daily      insulin degludec (TRESIBA) 100 units/mL injection pen Inject 80 Units under the skin      levothyroxine 112 mcg tablet       lisinopril (ZESTRIL) 5 mg tablet       loratadine (CLARITIN) 10 mg tablet Take 10 mg by mouth daily      metoprolol succinate (TOPROL-XL) 50 mg 24 hr tablet Take 1 tablet (50 mg total) by mouth daily Do not start before October 31, 2022.  0    pantoprazole (PROTONIX) 40 mg tablet Take 1 tablet (40 mg total) by mouth daily 30 tablet 2    polyethylene glycol (MIRALAX) 17 g packet Take 17 g by mouth daily 1700 g 3    tamsulosin (FLOMAX) 0.4 mg Take 1 capsule (0.4 mg total) by mouth daily with dinner  0    azelastine (ASTELIN) 0.1 % nasal spray 1 spray into each nostril 2 (two) times a day Use in each nostril as directed 5 mL 3    ezetimibe (ZETIA) 10 mg tablet Take 10 mg by mouth daily      ipratropium (ATROVENT) 0.03 % nasal spray 2 sprays into each nostril every 12 (twelve) hours 10 mL 1     No current facility-administered medications for this visit.       /78 (BP Location: Left arm, Patient Position: Sitting, Cuff Size: Large)   Pulse 77   Ht 5' 10\" (1.778 m)   Wt (!) 143 kg (316 lb)   SpO2 96%   BMI 45.34 kg/m²     Review of Systems   All other systems reviewed and are negative.      Physical Exam  Vitals reviewed.   Constitutional:       General: He is not in acute distress.     Appearance: He is well-developed.   HENT:      Head: Normocephalic and atraumatic.   Eyes:      General: No scleral icterus.     Conjunctiva/sclera: Conjunctivae normal.   Cardiovascular:      Rate and Rhythm: Normal rate and regular rhythm.      Pulses: Normal pulses.      Heart sounds: Normal heart sounds.      No gallop.   Pulmonary:      Effort: Pulmonary effort is normal. No respiratory distress.      Breath sounds: Normal breath sounds. No wheezing or rales.   Abdominal:      General: Bowel " "sounds are normal. There is no distension.      Palpations: Abdomen is soft.      Tenderness: There is no abdominal tenderness.   Musculoskeletal:      Right lower leg: Edema present.      Left lower leg: Edema present.   Skin:     General: Skin is warm and dry.      Capillary Refill: Capillary refill takes less than 2 seconds.   Neurological:      Mental Status: He is alert.   Psychiatric:         Mood and Affect: Mood normal.          Lab Results   Component Value Date    K 4.4 01/17/2024     01/17/2024    CO2 25 01/17/2024    BUN 22 01/17/2024    CREATININE 1.10 01/17/2024    CALCIUM 9.3 01/17/2024    ALT 23 10/13/2023    AST 28 10/13/2023    INR 2.1 05/11/2022       No results found for: \"CHOL\", \"HDL\", \"LDLCALC\", \"TRIG\"    Lab Results   Component Value Date    WBC 10.63 (H) 01/17/2024    HGB 13.3 01/17/2024    HCT 41.9 01/17/2024     01/17/2024       Lab Results   Component Value Date     10/13/2023    HGBA1C 8.6 (H) 10/13/2023       Lab Results   Component Value Date    TSH 5.23 (H) 02/07/2022       Cardiac studies:   Results for orders placed or performed in visit on 02/06/24   POCT ECG    Impression    Baseline artifact  Sinus rhythm with marked first degree AV block vs accel junction rhythm, narrow QRS       ASSESSMENT AND PLAN:  Diagnoses and all orders for this visit:    Paroxysmal atrial fibrillation (HCC): History of paroxysmal atrial fibrillation.  He is on chronic anticoagulation with Pradaxa.  Continue metoprolol succinate 50 mg daily.  -     POCT ECG    Sick sinus syndrome (HCC): History of PPM implantation in December 2016.  Will arrange device checks through our office.  -     POCT ECG    Nonrheumatic aortic valve stenosis: Echocardiogram from 12/2023 with mild stenosis.  Will continue to monitor at appropriate intervals.  -     POCT ECG    Hypertension, unspecified type: Blood pressure is acceptable on current dose of lisinopril and metoprolol.  He is also on furosemide 20 mg " daily.  -     POCT ECG    NSVT (nonsustained ventricular tachycardia) ([facility]): He has a history of NSVT noted on his pacemaker device checks.  He had a Lexiscan NM stress in September 2022 which did not show ischemia.  Diaphragmatic attenuation artifact of the inferior wall noted.  -     POCT ECG    Dyslipidemia: Last lipid panel from February 2022 with total cholesterol 158, triglycerides 122, HDL 33, ..  History of statin intolerance.  He is on ezetimibe 10 mg daily.  -     POCT ECG  -     Lipid Panel with Direct LDL reflex; Future    Coronary artery calcification: Noted on CT imaging from April and November 2023.  Statin intolerance as above.  Continue ezetimibe and repeat lipid panel. ? Not on aspirin due to history of GI bleeding.  -     POCT ECG    Obesity, morbid ([facility]):   JAMES (obstructive sleep apnea): History of JAMES but intolerant to CPAP.  Morbid obesity with BMI 45.  -     POCT ECG    Type 2 diabetes mellitus with other specified complication, with long-term current use of insulin ([facility])  Metabolic syndrome  Insulin for his history of diabetes mellitus.  Last hemoglobin A1c 8.6%.  -     POCT ECG    CKD (chronic kidney disease) stage 2, GFR 60-89 ml/min: BMP from January 2024 with creatinine 1.1 and EGFR 62.  -     POCT ECG    Aneurysm of ascending aorta without rupture ([facility]): CT chest from 11/2023 without mention of thoracic aortic aneurysm.  There was coronary artery calcifications.  Echocardiogram from 12/15/2023 with normal aortic root and ascending aorta size.       Olga Sahni MD

## 2024-03-05 ENCOUNTER — OFFICE VISIT (OUTPATIENT)
Dept: OBGYN CLINIC | Facility: MEDICAL CENTER | Age: 84
End: 2024-03-05
Payer: MEDICARE

## 2024-03-05 VITALS
BODY MASS INDEX: 45.1 KG/M2 | DIASTOLIC BLOOD PRESSURE: 82 MMHG | SYSTOLIC BLOOD PRESSURE: 158 MMHG | HEIGHT: 70 IN | WEIGHT: 315 LBS | HEART RATE: 84 BPM

## 2024-03-05 DIAGNOSIS — M25.551 PAIN IN RIGHT HIP: ICD-10-CM

## 2024-03-05 DIAGNOSIS — M25.561 CHRONIC PAIN OF RIGHT KNEE: ICD-10-CM

## 2024-03-05 DIAGNOSIS — Z96.641 HX OF TOTAL HIP ARTHROPLASTY, RIGHT: ICD-10-CM

## 2024-03-05 DIAGNOSIS — Z96.651 HISTORY OF TOTAL KNEE ARTHROPLASTY, RIGHT: ICD-10-CM

## 2024-03-05 DIAGNOSIS — G89.29 CHRONIC PAIN OF RIGHT KNEE: ICD-10-CM

## 2024-03-05 DIAGNOSIS — T84.84XD HIP PAIN DUE TO RECALLED HIP ARTHROPLASTY HARDWARE, SUBSEQUENT ENCOUNTER: Primary | ICD-10-CM

## 2024-03-05 DIAGNOSIS — Z96.649 HIP PAIN DUE TO RECALLED HIP ARTHROPLASTY HARDWARE, SUBSEQUENT ENCOUNTER: Primary | ICD-10-CM

## 2024-03-05 PROCEDURE — 20610 DRAIN/INJ JOINT/BURSA W/O US: CPT | Performed by: STUDENT IN AN ORGANIZED HEALTH CARE EDUCATION/TRAINING PROGRAM

## 2024-03-05 PROCEDURE — 99214 OFFICE O/P EST MOD 30 MIN: CPT | Performed by: STUDENT IN AN ORGANIZED HEALTH CARE EDUCATION/TRAINING PROGRAM

## 2024-03-05 NOTE — PROGRESS NOTES
Knee New Office Note    Assessment:     1. Chronic pain of right knee    2. History of total knee arthroplasty, right    3. Pain in right hip    4. Hx of total hip arthroplasty, right        Plan:     Problem List Items Addressed This Visit    None  Visit Diagnoses       Chronic pain of right knee    -  Primary    Relevant Orders    Large joint arthrocentesis: R knee    History of total knee arthroplasty, right        Relevant Orders    Large joint arthrocentesis: R knee    Pain in right hip        Relevant Orders    FL guided needle plac bx/asp/inj    Hx of total hip arthroplasty, right        Relevant Orders    [place]    Chromium level    FL guided needle plac bx/asp/inj           Findings today are consistent with right knee pain with history of right total knee arthroplasty performed by Dr. Holden roughly 14 years ago and right hip pain with history of right total hip arthroplasty also performed by Dr. Holden. Imaging and prognosis was reviewed with the patient today. His blood work returned elevated and his bone scan was concerning for potential loosening of the patellar component. Aspiration of right knee performed today to be sent for synovasure. Blood work for the hip ordered to rule out metalosis as today he is localizing more pain to the hip and has a metal on metal total hip arthroplasty. Aspiration of the right hip ordered to also be sent for synovasure due to elevations of inflammatory markers. Follow up after testing is complete.    Subjective:     Patient ID: Lyndon Chairez is a 83 y.o. male.  Chief Complaint:  HPI:  83 y.o. male presents to the office for follow up of right knee pain with history of right total knee arthroplasty performed by Dr. Holden roughly 14 years ago. He is here today to review the results of his blood work and bone scan. He is experiencing anterior right knee pain made worse with activities including ambulation. He reports that he previously had a well-functioning  arthroplasty without pain.  The pain has been present for the last few months.  He has diffuse tenderness palpation around the knee. He is also experiencing worsening right hip and groin pain with history of right total hip arthroplasty performed by Dr. Holden. He has noticed a lump in his posterolateral right hip. Both his right hip and right knee are causing him increased pain. He has also been treated with Dr. Slater for his right hip greater trochanteric bursa with some relief. He denies any numbness or tingling.     Allergy:  Allergies   Allergen Reactions    Aspirin GI Bleeding and Other (See Comments)    Sitagliptin Hives and Rash    Canagliflozin Rash and Hives     Burning with urination and kidneys started to fail     Medications:  all current active meds have been reviewed  Past Medical History:  Past Medical History:   Diagnosis Date    Diabetes (HCC)     Essential tremor     Hyperlipidemia     Hypertension     Obstructive sleep apnea     Paroxysmal atrial fibrillation (HCC)     Sick sinus syndrome (HCC)      Past Surgical History:  Past Surgical History:   Procedure Laterality Date    CT NEEDLE BIOPSY LUNG  2021    CT NEEDLE BIOPSY LUNG  2021     Family History:  History reviewed. No pertinent family history.  Social History:  Social History     Substance and Sexual Activity   Alcohol Use Not Currently     Social History     Substance and Sexual Activity   Drug Use Never     Social History     Tobacco Use   Smoking Status Former    Current packs/day: 0.00    Average packs/day: 2.0 packs/day for 7.0 years (14.0 ttl pk-yrs)    Types: Cigarettes    Start date:     Quit date:     Years since quittin.2   Smokeless Tobacco Never           ROS:  General: Per HPI  Skin: Negative, except if noted below  HEENT: Negative  Respiratory: Negative  Cardiovascular: Negative  Gastrointestinal: Negative  Urinary: Negative  Vascular: Negative  Musculoskeletal: Positive per HPI   Neurologic: Positive per  "HPI  Endocrine: Negative    Objective:  BP Readings from Last 1 Encounters:   03/05/24 158/82      Wt Readings from Last 1 Encounters:   03/05/24 (!) 143 kg (316 lb)        Respiratory:   non-labored respirations    Lymphatics:  no palpable lymph nodes    Gait:   Ambulates with the assistance of a walker     Neurologic:   Alert and oriented times 3  Patient with normal sensation except as noted below  Deep tendon reflexes 2+ except as noted in MSK exam     Bilateral Lower Extremity:     Right knee:     Inspection:  well healed total knee incisions    Overall limb alignment neutral    Effusion: none    ROM 5-105 with pain    Extensor Lag: none    Palpation: diffuse tenderness to palpation    AP Stability at 90 deg stable    M/L stability in full extension stable    M/L stability in midflexion stable    Motor: 5/5 Q/HS/TA/GS/P    Pulses: 2+ DP / 2+ PT    SILT DP/SP/S/S/TN    Right Hip     Inspection: well healed incision. Fluid collection present to lateral hip    Range of Motion: limited due to pain    + Trendelenburg sign    Motor: 5/5 IP/Q/HS/TA/GS    Pulses: 2+ DP / 2+ PT    SILT DP/SP/S/S/TN      Imaging:  My interpretation bone scan 01/15/2024: uptake present to the right patella and right proximal tibial component concerning for lucency.     BMI:   Estimated body mass index is 45.34 kg/m² as calculated from the following:    Height as of this encounter: 5' 10\" (1.778 m).    Weight as of this encounter: 143 kg (316 lb).  BSA:   Estimated body surface area is 2.53 meters squared as calculated from the following:    Height as of this encounter: 5' 10\" (1.778 m).    Weight as of this encounter: 143 kg (316 lb).         Large joint arthrocentesis: R knee  Universal Protocol:  Consent: Verbal consent obtained.  Risks and benefits: risks, benefits and alternatives were discussed  Consent given by: patient  Time out: Immediately prior to procedure a \"time out\" was called to verify the correct patient, procedure, " equipment, support staff and site/side marked as required.  Timeout called at: 3/5/2024 11:56 AM.  Patient understanding: patient states understanding of the procedure being performed  Site marked: the operative site was marked  Patient identity confirmed: verbally with patient  Supporting Documentation  Indications: pain   Procedure Details  Location: knee - R knee  Preparation: Patient was prepped and draped in the usual sterile fashion  Needle size: 22 G    Aspirate amount: 10 mL  Aspirate: clear and yellow  Analysis: fluid sample sent for laboratory analysis ([facility])  Patient tolerance: patient tolerated the procedure well with no immediate complications  Dressing:  Sterile dressing applied          Scribe Attestation      I,:  Arline Donis am acting as a scribe while in the presence of the attending physician.:       I,:  Iván Silva DO personally performed the services described in this documentation    as scribed in my presence.:

## 2024-03-06 NOTE — NURSING NOTE
Call placed to patient to discuss upcoming appointment at [facility] radiology department and complete consultation with patient. Patient is having a right hip asp utilizing fluoroscopy  guidance. Reviewed patient's allergies, no current anticoagulant medication present per patient, also discussed the pre and post procedure expectations. Called [facility] @ [phone] to inform them that patient needs to be here by 12 pm on 3/11/24, left message in scheduling machine. Reminded patient of location and time expected for procedure, Patient expressed understanding by verbalizing and repeating instructions.

## 2024-03-08 ENCOUNTER — TELEPHONE (OUTPATIENT)
Dept: LAB | Facility: HOSPITAL | Age: 84
End: 2024-03-08

## 2024-03-11 ENCOUNTER — APPOINTMENT (OUTPATIENT)
Dept: LAB | Facility: HOSPITAL | Age: 84
End: 2024-03-11
Attending: STUDENT IN AN ORGANIZED HEALTH CARE EDUCATION/TRAINING PROGRAM
Payer: MEDICARE

## 2024-03-11 ENCOUNTER — HOSPITAL ENCOUNTER (OUTPATIENT)
Dept: RADIOLOGY | Facility: HOSPITAL | Age: 84
Discharge: HOME | End: 2024-03-11
Attending: STUDENT IN AN ORGANIZED HEALTH CARE EDUCATION/TRAINING PROGRAM
Payer: MEDICARE

## 2024-03-11 DIAGNOSIS — M25.561 CHRONIC PAIN OF RIGHT KNEE: ICD-10-CM

## 2024-03-11 DIAGNOSIS — Z96.651 HISTORY OF TOTAL KNEE ARTHROPLASTY, RIGHT: ICD-10-CM

## 2024-03-11 DIAGNOSIS — G89.29 CHRONIC PAIN OF RIGHT KNEE: ICD-10-CM

## 2024-03-11 DIAGNOSIS — Z96.641 HX OF TOTAL HIP ARTHROPLASTY, RIGHT: ICD-10-CM

## 2024-03-11 DIAGNOSIS — M25.551 PAIN IN RIGHT HIP: ICD-10-CM

## 2024-03-11 DIAGNOSIS — E78.5 DYSLIPIDEMIA: ICD-10-CM

## 2024-03-11 LAB
CRP SERPL QL: 15.1 MG/L
ERYTHROCYTE [SEDIMENTATION RATE] IN BLOOD: 42 MM/HOUR (ref 0–19)

## 2024-03-11 PROCEDURE — 82495 ASSAY OF CHROMIUM: CPT

## 2024-03-11 PROCEDURE — 20610 DRAIN/INJ JOINT/BURSA W/O US: CPT

## 2024-03-11 PROCEDURE — 36415 COLL VENOUS BLD VENIPUNCTURE: CPT

## 2024-03-11 PROCEDURE — 86140 C-REACTIVE PROTEIN: CPT

## 2024-03-11 PROCEDURE — 85652 RBC SED RATE AUTOMATED: CPT

## 2024-03-11 PROCEDURE — 83018 HEAVY METAL QUAN EACH NES: CPT

## 2024-03-11 PROCEDURE — 77002 NEEDLE LOCALIZATION BY XRAY: CPT

## 2024-03-11 RX ORDER — LIDOCAINE HYDROCHLORIDE 10 MG/ML
5 INJECTION, SOLUTION EPIDURAL; INFILTRATION; INTRACAUDAL; PERINEURAL ONCE
Status: COMPLETED | OUTPATIENT
Start: 2024-03-11 | End: 2024-03-11

## 2024-03-11 RX ADMIN — IOHEXOL 5 ML: 300 INJECTION, SOLUTION INTRAVENOUS at 14:18

## 2024-03-11 RX ADMIN — LIDOCAINE HYDROCHLORIDE 5 ML: 10 INJECTION, SOLUTION EPIDURAL; INFILTRATION; INTRACAUDAL; PERINEURAL at 14:18

## 2024-03-13 LAB — COBALT SERPL-MCNC: <1 UG/L (ref 0–0.9)

## 2024-03-15 LAB — CR SERPL-MCNC: 0.5 UG/L (ref 0.1–2.1)

## 2024-03-23 NOTE — SPEECH THERAPY NOTE
Speech-Language Pathology Bedside Swallow Evaluation      Patient Name: Charissa Kelly    [facility] Date: 10/28/2022     Problem List  Principal Problem:    Chest pain  Active Problems:    Lightheadedness    Diarrhea    Acute renal failure superimposed on chronic kidney disease (HCC)    Dysphagia    Sick sinus syndrome (HCC)    Paroxysmal atrial fibrillation (HCC)    BPH (benign prostatic hyperplasia)    Obesity    Hyperkalemia    Type 2 diabetes mellitus ([facility] [address] )    COVID-19      Past Medical History  Past Medical History:   Diagnosis Date   • Diabetes ([facility] [address] )    • Essential tremor    • Hyperlipidemia    • Hypertension    • Obstructive sleep apnea    • Paroxysmal atrial fibrillation (HCC)    • Sick sinus syndrome [facility])        Past Surgical History  Past Surgical History:   Procedure Laterality Date   • CT NEEDLE BIOPSY LUNG  2/4/2021       Summary   Pt presented with functional appearing oral and pharyngeal stage swallowing skills with materials administered today  The patient is seen at lunch meal and is assessed with grilled chicken sandwich, mixed consistency soup and thin liquids  The patient reports the sensation of food sticking when swallowing, requiring liquid wash to clear  No overt s/s aspiration observed  The patient reports ease of intake with mixed consistency soup  Also feels like the trouble swallowing started after taking his medications for Covid       Risk/s for Aspiration: low      Recommended Diet: soft/level 3 diet and thin liquids   Recommended Form of Meds: whole with liquid   Aspiration precautions and swallowing strategies: small bites/sips and alternating bites and sips  Other Recommendations: Continue frequent oral care; VBS to assess swallow function either as IP or OP    Current Medical Status  Chief Complaint:  Chest pain, lightheadedness   History of Present Illness:  Charissa Kelly is a [de-identified] y o  male with a PMH of hypertension, hyperlipidemia, BPH, hypothyroidism, paroxysmal atrial fibrillation, sick sinus syndrome status post ppm, obstructive sleep apnea, CKD stage 3 who presents with chest pain and lightheadedness  Patient developed midsternal chest pain after taking a shower this morning which was radiating to his back, left arm and neck  Associated with left arm numbness  He called 911, he got a dose of nitro which alleviated the pain  After nitro his blood pressure dropped  He also reports lightheadedness for over a week  Diagnosed with COVID around 3 weeks ago  Poor p o  intake for over a week, complains of bad taste in his mouth which he got after he took pills for COVID  His blood sugar was in the 50s this morning  Report almost 3 weeks of watery diarrhea once a day but he can happen up to 3 times a day  Reports lightheadedness which most often happens after he has then stop  Report food getting stuck in his throat, contributed to the pills he was taking for COVID  Current Precautions:  Aspiration    Allergies:  No known food allergies  Past medical history:  Please see H&P for details    Special Studies:  CTA chest abdomen 10/27/2022:   IMPRESSION:   1   No evidence for aortic aneurysm, dissection, or intramural hematoma  2   Stable pulmonary nodules as noted  3   Postoperative change lateral abdominal wall with questionable adjacent small midline ventral Mobley hernia involving small bowel without evidence for strangulation or obstruction  4   Additional findings as noted      Social/Education/Vocational Hx:  Pt lives with family    Swallow Information   Current Risks for Dysphagia & Aspiration: recent covid infection and c/o food sticking   Current Symptoms/Concerns: c/o food sticking  Current Diet: regular diet and thin liquids   Baseline Diet: regular diet and thin liquids    Baseline Assessment   Behavior/Cognition: alert  Speech/Language Status: able to participate in conversation and able to follow commands  Patient Positioning: upright in chair  Pain Status/Interventions/Response to Interventions: No report of or nonverbal indications of pain  Swallow Mechanism Exam  Facial: symmetrical  Labial: WFL  Lingual: WFL  Velum: unable to visualize  Mandible: adequate ROM  Dentition: full upper dentures, lower has poor dentition   Vocal quality:clear/adequate   Respiratory Status: on RA       Consistencies Assessed and Performance   Consistencies Administered: thin liquids, hard solids and mixed consistency  Materials administered included chicken sandwich and mixed consistency soup with thin liquids     Oral Stage: WFL  Mastication was adequate with the materials administered today  Bolus formation and transfer were functional with no significant oral residue noted  No overt s/s reduced oral control  Pharyngeal Stage: WFL  Swallow Mechanics:  Swallowing initiation appeared prompt  Laryngeal rise was palpated and judged to be within functional limits  No coughing, throat clearing, change in vocal quality or respiratory status noted today  Esophageal Concerns: none reported    Summary and Recommendations (see above)    Results Reviewed with: patient and MD     Treatment Recommended: dysphagia therapy while in acute care  VBS as able either in IP or OP     Frequency of treatment: 2-3 x week     Patient Stated Goal: "the soup goes down much better"    Dysphagia LTG  -Patient will demonstrate safe and effective oral intake (without overt s/s significant oral/pharyngeal dysphagia including s/s penetration or aspiration) for the highest appropriate diet level       Short Term Goals:  -Pt will tolerate Dysphagia 3/advanced (dental soft) diet and thin liquid with no significant s/s oral or pharyngeal dysphagia across 1-3 diagnostic session/s     -Patient will tolerate trials of upgraded food and/or liquid texture with no significant s/s of oral or pharyngeal dysphagia including aspiration across 1-3 diagnostic sessions     -Patient will comply with a Video/Modified Barium Swallow study for more complete assessment of swallowing anatomy/physiology/aspiration risk and to assess efficacy of treatment techniques so as to best guide treatment plan    Speech Therapy Prognosis   Prognosis: good    Prognosis Considerations: medical status Assistance OOB with selected safe patient handling equipment if applicable/Assistance with ambulation/Communicate fall risk and risk factors to all staff, patient, and family/Monitor gait and stability/Provide patient with walking aids/Provide visual cue: yellow wristband, yellow gown, etc/Reinforce activity limits and safety measures with patient and family/Call bell, personal items and telephone in reach/Instruct patient to call for assistance before getting out of bed/chair/stretcher/Non-slip footwear applied when patient is off stretcher/[place] to call system/Physically safe environment - no spills, clutter or unnecessary equipment/Purposeful Proactive Rounding/Room/bathroom lighting operational, light cord in reach

## 2024-04-08 ENCOUNTER — OFFICE VISIT (OUTPATIENT)
Dept: OBGYN CLINIC | Facility: MEDICAL CENTER | Age: 84
End: 2024-04-08
Payer: MEDICARE

## 2024-04-08 VITALS
HEIGHT: 70 IN | SYSTOLIC BLOOD PRESSURE: 108 MMHG | HEART RATE: 76 BPM | WEIGHT: 315 LBS | BODY MASS INDEX: 45.1 KG/M2 | DIASTOLIC BLOOD PRESSURE: 69 MMHG

## 2024-04-08 DIAGNOSIS — M25.561 CHRONIC PAIN OF RIGHT KNEE: ICD-10-CM

## 2024-04-08 DIAGNOSIS — G89.29 CHRONIC PAIN OF RIGHT KNEE: ICD-10-CM

## 2024-04-08 DIAGNOSIS — Z96.651 HISTORY OF TOTAL KNEE ARTHROPLASTY, RIGHT: ICD-10-CM

## 2024-04-08 DIAGNOSIS — Z96.649 HIP PAIN DUE TO RECALLED HIP ARTHROPLASTY HARDWARE, SUBSEQUENT ENCOUNTER: ICD-10-CM

## 2024-04-08 DIAGNOSIS — M70.61 TROCHANTERIC BURSITIS OF RIGHT HIP: Primary | ICD-10-CM

## 2024-04-08 DIAGNOSIS — T84.84XD HIP PAIN DUE TO RECALLED HIP ARTHROPLASTY HARDWARE, SUBSEQUENT ENCOUNTER: ICD-10-CM

## 2024-04-08 PROCEDURE — 20610 DRAIN/INJ JOINT/BURSA W/O US: CPT | Performed by: STUDENT IN AN ORGANIZED HEALTH CARE EDUCATION/TRAINING PROGRAM

## 2024-04-08 PROCEDURE — 99214 OFFICE O/P EST MOD 30 MIN: CPT | Performed by: STUDENT IN AN ORGANIZED HEALTH CARE EDUCATION/TRAINING PROGRAM

## 2024-04-08 RX ADMIN — TRIAMCINOLONE ACETONIDE 40 MG: 40 INJECTION, SUSPENSION INTRA-ARTICULAR; INTRAMUSCULAR at 15:15

## 2024-04-08 RX ADMIN — BUPIVACAINE HYDROCHLORIDE 4 ML: 2.5 INJECTION, SOLUTION INFILTRATION; PERINEURAL at 15:15

## 2024-04-09 RX ORDER — TRIAMCINOLONE ACETONIDE 40 MG/ML
40 INJECTION, SUSPENSION INTRA-ARTICULAR; INTRAMUSCULAR
Status: COMPLETED | OUTPATIENT
Start: 2024-04-08 | End: 2024-04-08

## 2024-04-09 RX ORDER — BUPIVACAINE HYDROCHLORIDE 2.5 MG/ML
4 INJECTION, SOLUTION INFILTRATION; PERINEURAL
Status: COMPLETED | OUTPATIENT
Start: 2024-04-08 | End: 2024-04-08

## 2024-05-28 ENCOUNTER — OFFICE VISIT (OUTPATIENT)
Dept: OBGYN CLINIC | Facility: MEDICAL CENTER | Age: 84
End: 2024-05-28
Payer: MEDICARE

## 2024-05-28 VITALS
DIASTOLIC BLOOD PRESSURE: 77 MMHG | BODY MASS INDEX: 45.1 KG/M2 | HEIGHT: 70 IN | HEART RATE: 75 BPM | WEIGHT: 315 LBS | SYSTOLIC BLOOD PRESSURE: 127 MMHG

## 2024-05-28 DIAGNOSIS — Z96.651 HISTORY OF TOTAL KNEE ARTHROPLASTY, RIGHT: ICD-10-CM

## 2024-05-28 DIAGNOSIS — M25.561 CHRONIC PAIN OF RIGHT KNEE: ICD-10-CM

## 2024-05-28 DIAGNOSIS — T84.84XD HIP PAIN DUE TO RECALLED HIP ARTHROPLASTY HARDWARE, SUBSEQUENT ENCOUNTER: ICD-10-CM

## 2024-05-28 DIAGNOSIS — G89.29 CHRONIC PAIN OF RIGHT KNEE: ICD-10-CM

## 2024-05-28 DIAGNOSIS — M70.61 TROCHANTERIC BURSITIS OF RIGHT HIP: Primary | ICD-10-CM

## 2024-05-28 DIAGNOSIS — Z96.649 HIP PAIN DUE TO RECALLED HIP ARTHROPLASTY HARDWARE, SUBSEQUENT ENCOUNTER: ICD-10-CM

## 2024-05-28 PROCEDURE — 99214 OFFICE O/P EST MOD 30 MIN: CPT | Performed by: STUDENT IN AN ORGANIZED HEALTH CARE EDUCATION/TRAINING PROGRAM

## 2024-05-28 RX ORDER — GABAPENTIN 300 MG/1
300 CAPSULE ORAL 3 TIMES DAILY
Qty: 90 CAPSULE | Refills: 0 | Status: SHIPPED | OUTPATIENT
Start: 2024-05-28

## 2024-05-28 NOTE — PROGRESS NOTES
Hip New Office Note    Assessment:     1. Chronic pain of right knee    2. History of total knee arthroplasty, right    3. Hip pain due to recalled hip arthroplasty hardware, subsequent encounter    4. Trochanteric bursitis of right hip          Plan:     Problem List Items Addressed This Visit    None  Visit Diagnoses       Chronic pain of right knee    -  Primary    History of total knee arthroplasty, right        Hip pain due to recalled hip arthroplasty hardware, subsequent encounter        Trochanteric bursitis of right hip                 Findings today are consistent with right knee pain with history of right total knee arthroplasty performed by Dr. Holden roughly 14 years ago and right hip trochanteric bursitis with history of right total hip arthroplasty also performed by Dr. Holden. Imaging and prognosis was reviewed with the patient today. Reviewed that his synovasure testing was negative for infection. Blood work reviewed that there is no evidence of metallosis. Bone scan was concerning for potential loosening of the patellar component. Reviewed that he is diffusely painful which can be indicative of nerve pathology. Prescribed Gabapentin 300 mg to be taken at night. Referral to Dr. Osorio for second opinion of right hip and knee pain. Follow up as needed.    Subjective:     Patient ID: Lyndon Chairez is a 84 y.o. male.  Chief Complaint:  HPI:  84 y.o. male presents to the office for follow up of right knee pain with history of right total knee arthroplasty performed by Dr. Holden roughly 14 years ago. At last visit he received a right hip trochanteric bursa injection which was mildly beneficial for a short period of time. He continues to experience pain to the lateral hip and buttock down his thigh to his anterior knee. He also notes worsening right knee pain with feelings of giving way and instability. His pain worsens with ambulation, hip/knee flexion, and laying on his right side. His pain  improves when he sits with his right knee extended and weight on his left buttock.     Allergy:  Allergies   Allergen Reactions    Aspirin GI Bleeding and Other (See Comments)    Sitagliptin Hives and Rash    Canagliflozin Rash and Hives     Burning with urination and kidneys started to fail     Medications:  all current active meds have been reviewed  Past Medical History:  Past Medical History:   Diagnosis Date    Diabetes (HCC)     Essential tremor     Hyperlipidemia     Hypertension     Obstructive sleep apnea     Paroxysmal atrial fibrillation (HCC)     Sick sinus syndrome (HCC)      Past Surgical History:  Past Surgical History:   Procedure Laterality Date    CT NEEDLE BIOPSY LUNG  2021    CT NEEDLE BIOPSY LUNG  2021    FL GUIDED NEEDLE PLAC BX/ASP/INJ  3/11/2024     Family History:  History reviewed. No pertinent family history.  Social History:  Social History     Substance and Sexual Activity   Alcohol Use Not Currently     Social History     Substance and Sexual Activity   Drug Use Never     Social History     Tobacco Use   Smoking Status Former    Current packs/day: 0.00    Average packs/day: 2.0 packs/day for 7.0 years (14.0 ttl pk-yrs)    Types: Cigarettes    Start date:     Quit date:     Years since quittin.4   Smokeless Tobacco Never         ROS:  General: Per HPI  Skin: Negative, except if noted below  HEENT: Negative  Respiratory: Negative  Cardiovascular: Negative  Gastrointestinal: Negative  Urinary: Negative  Vascular: Negative  Musculoskeletal: Positive per HPI   Neurologic: Positive per HPI  Endocrine: Negative    Objective:  BP Readings from Last 1 Encounters:   24 127/77      Wt Readings from Last 1 Encounters:   24 (!) 143 kg (316 lb)        Respiratory:   non-labored respirations    Lymphatics:  no palpable lymph nodes    Gait and Station:   Ambulates with the assistance of a walker     Neurologic:   Alert and oriented times 3  Patient with normal sensation  "except as noted below  Deep tendon reflexes 2+ except as noted in MSK exam     Bilateral Lower Extremity:     Right knee:     Inspection:  well healed total knee incisions    Overall limb alignment neutral    Effusion: none    ROM 5-105 with pain    Extensor Lag: none    Palpation: diffuse tenderness to palpation    AP Stability at 90 deg stable    M/L stability in full extension stable    M/L stability in midflexion stable    Motor: 5/5 Q/HS/TA/GS/P    Pulses: 2+ DP / 2+ PT    SILT DP/SP/S/S/TN     Right Hip     Inspection: well healed incision.     Range of Motion: limited due to pain    Tender to palpation diffusely over lateral hip    + Trendelenburg sign    Motor: 5/5 IP/Q/HS/TA/GS    Pulses: 2+ DP / 2+ PT    SILT DP/SP/S/S/TN    Imaging:  No new imaging obtained today    BMI:   Estimated body mass index is 45.34 kg/m² as calculated from the following:    Height as of this encounter: 5' 10\" (1.778 m).    Weight as of this encounter: 143 kg (316 lb).  BSA:   Estimated body surface area is 2.53 meters squared as calculated from the following:    Height as of this encounter: 5' 10\" (1.778 m).    Weight as of this encounter: 143 kg (316 lb).         Scribe Attestation      I,:  Arline Donis am acting as a scribe while in the presence of the attending physician.:       I,:  Iván Silva, DO personally performed the services described in this documentation    as scribed in my presence.:            "

## 2024-05-29 ENCOUNTER — IN-CLINIC DEVICE VISIT (OUTPATIENT)
Dept: CARDIOLOGY CLINIC | Facility: CLINIC | Age: 84
End: 2024-05-29
Payer: MEDICARE

## 2024-05-29 DIAGNOSIS — Z95.0 PRESENCE OF CARDIAC PACEMAKER: Primary | ICD-10-CM

## 2024-05-29 PROCEDURE — 93295 DEV INTERROG REMOTE 1/2/MLT: CPT | Performed by: INTERNAL MEDICINE

## 2024-05-29 PROCEDURE — 93296 REM INTERROG EVL PM/IDS: CPT | Performed by: INTERNAL MEDICINE

## 2024-05-29 NOTE — PROGRESS NOTES
Results for orders placed or performed in visit on 05/29/24   Cardiac EP device report    Narrative    MDT DC PPM (MVP ON) - ACTIVE SYSTEM IS MRI CONDITIONAL  DEVICE INTERROGATED IN THE [facility] OFFICE: NP TRANSFER PER DR. JACOB; BATTERY VOLTAGE ADEQUATE (3 YRS). AP 73.3%  16.1% (AAIR-DDDR 60PPM); ALL AVAILABLE LEAD PARAMETERS WITHIN NORMAL LIMITS. 1 VT-NS EPISODE WITH EGM FOR NSVT - 20 @ ; EF 60% (12/15/23 ECHO); PATIENT ASYMPTOMATIC W/HX OF NSVT PER PREV. [facility] REPORTS; TAKING PRADAXA, METOPROLOL SUCC; TASK TO DR. JACOB FOR REVIEW; NO PROGRAMMING CHANGES MADE TO DEVICE PARAMETERS. HOME REMOTE ACTIVE/TRANSFER RCV'D; NORMAL DEVICE FUNCTION.  ES

## 2024-06-07 ENCOUNTER — OFFICE VISIT (OUTPATIENT)
Dept: OBGYN CLINIC | Facility: CLINIC | Age: 84
End: 2024-06-07
Payer: MEDICARE

## 2024-06-07 VITALS
BODY MASS INDEX: 45.1 KG/M2 | DIASTOLIC BLOOD PRESSURE: 87 MMHG | HEART RATE: 75 BPM | SYSTOLIC BLOOD PRESSURE: 142 MMHG | HEIGHT: 70 IN | WEIGHT: 315 LBS

## 2024-06-07 DIAGNOSIS — Z96.651 HISTORY OF TOTAL KNEE ARTHROPLASTY, RIGHT: ICD-10-CM

## 2024-06-07 DIAGNOSIS — G89.29 CHRONIC PAIN OF RIGHT KNEE: ICD-10-CM

## 2024-06-07 DIAGNOSIS — Z96.659 FAILURE OF TOTAL KNEE REPLACEMENT, INITIAL ENCOUNTER  (HCC): ICD-10-CM

## 2024-06-07 DIAGNOSIS — T84.018A FAILURE OF TOTAL KNEE REPLACEMENT, INITIAL ENCOUNTER  (HCC): ICD-10-CM

## 2024-06-07 DIAGNOSIS — Z96.649 HIP PAIN DUE TO RECALLED HIP ARTHROPLASTY HARDWARE, SUBSEQUENT ENCOUNTER: ICD-10-CM

## 2024-06-07 DIAGNOSIS — M46.1 SACROILIITIS, NOT ELSEWHERE CLASSIFIED (HCC): ICD-10-CM

## 2024-06-07 DIAGNOSIS — M25.561 CHRONIC PAIN OF RIGHT KNEE: ICD-10-CM

## 2024-06-07 DIAGNOSIS — T84.84XD HIP PAIN DUE TO RECALLED HIP ARTHROPLASTY HARDWARE, SUBSEQUENT ENCOUNTER: ICD-10-CM

## 2024-06-07 DIAGNOSIS — M70.61 TROCHANTERIC BURSITIS OF RIGHT HIP: ICD-10-CM

## 2024-06-07 PROCEDURE — 99214 OFFICE O/P EST MOD 30 MIN: CPT | Performed by: STUDENT IN AN ORGANIZED HEALTH CARE EDUCATION/TRAINING PROGRAM

## 2024-06-07 NOTE — PROGRESS NOTES
Date: 24  Lyndon Chairez   MRN# [id number]  : 1940      Chief Complaint: Right Knee Pain    Assessment and Plan:  The patient verbalized understanding of exam findings and treatment plan. We engaged in the shared decision-making process and treatment options were discussed at length with the patient. Surgical and conservative management discussed today along with risks and benefits. Patient was agreeable with the plan and all questions were answered to satisfaction.     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    -Infection workup performed by Dr. Silva was reviewed.  All Synovasure results have been negative thus far.  -While the patient's right knee would likely benefit from revision arthroplasty to address the ligamentous instability and mechanical loosening of the patella, he has not a good medical candidate at this time.  A discussion was had with the patient regarding his BMI with a goal of 40 kg/m2 prior to being considered a surgical candidate.  His HbA1c of 8.6 also indicates that his diabetes is poorly controlled.  He must achieve a hemoglobin A1c of at least 7.5 or lower prior to being considered a surgical candidate.  -WBAT  -Activity modification to limit strain or impact on the joint  -Voltaren Gel as needed  -Tylenol 1000mg up to three times daily as needed. Do not exceed 3000mg daily  -Supervised physical therapy. Script provided   -Home exercise program directed by PT  -Weight loss discussed, referral to weight management provided  -Cane or walker recommended to offload joint  -Patient may follow up in 3 month(s) for further evaluation and treatment      Subjective:     Knee Pain  Patient presents to the clinic today with  complaints of right knee pain. Patient states he was previously seen by Dr. Silva for his right hip and right knee.  Patient has a history of prior right total knee arthroplasty performed by Dr. Jesus Holden roughly 14 years ago.  This is evaluated as a personal injury.  He has had fairly persistent pain in the right knee for the past several years.  The pain is located pes anserine, distal IT band insertion and rates their pain as 7/10. He describes the symptoms as sharp and throbbing. Symptoms improve with medication: NSAID, Tylenol used but not effective, physical therapy, the use of a walker. The symptoms are worse with activity. The knee has not given out or felt unstable. The patient can bend and straighten the knee fully.  The patient is active in none. Treatment to date has been Tylenol, NSAID's, therapy, without mild relief.     External Records Reviewed: Clinic notes, radiographic reports and laboratory analysis    Prior treatment:  NSAIDs Yes    Bracing No   Physical Therapy Yes   Cortisone Injections No   Viscosupplementation No     Allergy:  Allergies   Allergen Reactions    Aspirin GI Bleeding and Other (See Comments)    Sitagliptin Hives and Rash    Canagliflozin Rash and Hives     Burning with urination and kidneys started to fail     Medications:  All current active meds have been reviewed   Past Medical History:  Past Medical History:   Diagnosis Date    Diabetes (HCC)     Essential tremor     Hyperlipidemia     Hypertension     Obstructive sleep apnea     Paroxysmal atrial fibrillation (HCC)     Sick sinus syndrome (HCC)      Past Surgical History:  Past Surgical History:   Procedure Laterality Date    CT NEEDLE BIOPSY LUNG  2/4/2021    CT NEEDLE BIOPSY LUNG  2/4/2021    FL GUIDED NEEDLE PLAC BX/ASP/INJ  3/11/2024     Family History:  No family history on file.  Social History:  Social History     Substance and Sexual Activity   Alcohol Use Not Currently     Social History     Substance and  "Sexual Activity   Drug Use Never     Social History     Tobacco Use   Smoking Status Former    Current packs/day: 0.00    Average packs/day: 2.0 packs/day for 7.0 years (14.0 ttl pk-yrs)    Types: Cigarettes    Start date:     Quit date: 1965    Years since quittin.4   Smokeless Tobacco Never           Review of Systems:  General- denies fever/chills  HEENT- denies hearing loss or sore throat  Eyes- denies eye pain or visual disturbances, denies red eyes  Respiratory- denies cough or SOB  Cardio- denies chest pain or palpitations  GI- denies abdominal pain  Endocrine- denies urinary frequency  Urinary- denies pain with urination  Musculoskeletal- Negative except noted above  Skin- denies rashes or wounds  Neurological- denies dizziness or headache  Psychiatric- denies anxiety or difficulty concentrating      Objective:   BP Readings from Last 1 Encounters:   24 142/87      Wt Readings from Last 1 Encounters:   24 (!) 143 kg (316 lb)      Pulse Readings from Last 1 Encounters:   24 75        BMI: Estimated body mass index is 45.34 kg/m² as calculated from the following:    Height as of this encounter: 5' 10\" (1.778 m).    Weight as of this encounter: 143 kg (316 lb).      Physical Exam  /87   Pulse 75   Ht 5' 10\" (1.778 m)   Wt (!) 143 kg (316 lb)   BMI 45.34 kg/m²   General/Constitutional: No apparent distress: well-nourished and well developed.  Eyes: normal ocular motion  Cardio: RRR, Normal S1S2, No m/r/g.   Lymphatic: No appreciable lymphadenopathy  Respiratory: Non-labored breathing, CTA b/l no w/c/r  Vascular: No edema, swelling or tenderness, except as noted in detailed exam. Extremities well perfused. No LE edema  Integumentary: No impressive skin lesions present, except as noted in detailed exam.  Neuro: No ataxia or tremors noted  Psych: Normal mood and affect, oriented to person, place and time. Appropriate affect.  Musculoskeletal: Normal, except as noted in detailed " exam and in HPI.    Gait and Station:   antalgic    Right Knee:      Inspection:  normal color, temperature, turgor and moisture    Overall limb alignment: neutral    Effusion: minimal    ROM 0 to 120 with pain    Extensor Lag: Absent    Palpation: Medial and Lateral joint line tenderness to palpation    unstable to AP translation at 90 deg    Coronal plane stable in full extension    Coronal plane equivocal in mid-flexion     Motor: 5/5 EHL/FHL/TA/GS/Qd/Hs    Vascular: Toes WWP with BCR    Sensory: SILT DP/SP/Vasiliy/Saph/Ti        Images:  I personally reviewed relevant images in the PACS system and my interpretation is as follows:    X-rays of the right knee: presence of a right total knee arthroplasty in appropriate position with no evidence of loosening or subsidence        Scribe Attestation      I,:  George Krishna am acting as a scribe while in the presence of the attending physician.:       I,:  Hector Osorio MD personally performed the services described in this documentation    as scribed in my presence.:               Hector Osorio MD  Adult Reconstruction Specialist   [facility]

## 2024-06-07 NOTE — ASSESSMENT & PLAN NOTE
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    -Infection workup performed by Dr. Silva was reviewed.  All Synovasure results have been negative thus far.  -While the patient's right knee would likely benefit from revision arthroplasty to address the ligamentous instability and mechanical loosening of the patella, he has not a good medical candidate at this time.  A discussion was had with the patient regarding his BMI with a goal of 40 kg/m2 prior to being considered a surgical candidate.  His HbA1c of 8.6 also indicates that his diabetes is poorly controlled.  He must achieve a hemoglobin A1c of at least 7.5 or lower prior to being considered a surgical candidate.  -WBAT  -Activity modification to limit strain or impact on the joint  -Voltaren Gel as needed  -Tylenol 1000mg up to three times daily as needed. Do not exceed 3000mg daily  -Supervised physical therapy. Script provided   -Home exercise program directed by PT  -Weight loss discussed, referral to weight management provided  -Cane or walker recommended to offload joint  -Patient may follow up in 3 month(s) for further evaluation and treatment

## 2024-07-26 ENCOUNTER — TELEPHONE (OUTPATIENT)
Dept: CARDIOLOGY CLINIC | Facility: CLINIC | Age: 84
End: 2024-07-26

## 2024-07-26 NOTE — TELEPHONE ENCOUNTER
7/26/24 [facility] CALLED. PT HAVING SHARP PAINS NEAR PM AND SHE WANTED TO SEND IN TRANSMISSION. WALKED HER THROUGH IT. REGAN WILL CALL HER BACK. CR

## 2024-08-08 ENCOUNTER — CLINICAL SUPPORT (OUTPATIENT)
Dept: CARDIOLOGY CLINIC | Facility: CLINIC | Age: 84
End: 2024-08-08
Payer: MEDICARE

## 2024-08-08 ENCOUNTER — TELEPHONE (OUTPATIENT)
Dept: CARDIOLOGY CLINIC | Facility: CLINIC | Age: 84
End: 2024-08-08

## 2024-08-08 ENCOUNTER — PREP FOR PROCEDURE (OUTPATIENT)
Dept: CARDIOLOGY CLINIC | Facility: CLINIC | Age: 84
End: 2024-08-08

## 2024-08-08 DIAGNOSIS — Z95.0 PRESENCE OF CARDIAC PACEMAKER: Primary | ICD-10-CM

## 2024-08-08 PROCEDURE — 99211 OFF/OP EST MAY X REQ PHY/QHP: CPT

## 2024-08-08 NOTE — TELEPHONE ENCOUNTER
"==View-only below this line===  ----- Message -----  From: Serena Marquez MA  Sent: 8/8/2024   1:26 PM EDT  To: Wilton Esquivel DO; Karl Villeda PA-C; *    This is for Lyndon Chairez, 1940, correct.    ----- Message -----  From: Wilton Esquivel DO  Sent: 8/8/2024   1:21 PM EDT  To: Karl Villeda PA-C; *    Generator change and pocket revision. Thanks. Wilton    ----- Message -----  From: Karl Villeda PA-C  Sent: 8/8/2024  12:02 PM EDT  To: Wilton Esquivel DO; *    Hello,    This patient has a MDT DC PPM and was seen in office today with complaints of \"shocking\" at the device site which started around one month ago. Device was interrogated today with stable lead parameters but was tender to the touch and quite bothersome per patient.    He has around 2y left until RRT, however given his complaints as above plan for a Gen change with pocket revision before RRT. Can we please get the patient scheduled for this?     He is on pradaxa which should be held for three doses prior to procedure per Dr. Esquivel     Thanks for your help,  - Karl    "

## 2024-08-08 NOTE — PROGRESS NOTES
Pt presents into the office today for a site check. Pt has been experiencing pain at his incision site for about a month. Pt states shocking pain.      Site reviewed by Dr. Esquivel and JASMIN Menendez. Pt will under go a gen change on 8/26/2024.

## 2024-08-08 NOTE — TELEPHONE ENCOUNTER
"Patient scheduled for Dual Chambers Pacemaker Gen Change / Pocket Revision device on 8/26/24 at [facility] with Dr. Esquivel.      I called [facility] [phone] and  talked w/Giselle who scheduled this case.   Instructions/Labs faxed to [facility] at Fx: [phone] Attn: Griselda Suazo.    Giselle aware of all general instructions.    Medication holds:   Lisinopril - Do not take day before and morning of procedure.     Furosemide (Lasix) - Do not take morning of procedure.    Tresiba - Take your regular dose as you normally would either the evening before or the morning of your procedure.      Blood Thinners:  Pradaxa - Do not take 3 doses before procedure per .    Labs to be done on 8/19/24:  CMP / CBC (FASTING 8 HOURS)  Faxed to [facility]  Attn: Griselda Suazo  Fx: [phone]        Thank you,  Jonelle \"Ashanti\" Al    "

## 2024-08-08 NOTE — LETTER
2024               DEVICE PROCEDURE INSTRUCTIONS    Lyndon Chairez   : 1940  MRN: [id number]  [address]   [address]  [facility] [id number]    Procedure: DUAL CHAMBERS PACEMAKER GEN CHANGE/POCKET REVISION      Procedure Date: 24    Location: [facility]  Address: [address] [place] [id number]        Labs to be done on 24: CMP /  CBC (FASTING 8 HOURS)    BLOOD THINNER INSTRUCTIONS (Coumadin / Warfarin / Pradaxa / Eliquis / Xarelto):     Pradaxa - Do not take 3 doses before procedure per .    Medication Hold:     Lisinopril - Do not take day before and morning of procedure.     Furosemide (Lasix) - Do not take morning of procedure.    Tresiba - Take your regular dose as you normally would either the evening before or the morning of your procedure.    Arrival Time: The Hospital will contact you the day prior to your procedure, usually between 4PM - 6PM to instruct you on the time and place to report. If you do not hear from a [facility]  by 5PM the evening prior to your procedure, please contact the campus you are scheduled at phone number below.     DO NOT eat or drink ANYTHING after midnight the night prior to your procedure including gum and candy.     You may have a SIP of WATER with your morning medications, UNLESS ADVISED OTHERWISE.     Please notify us if you have been prescribed a NEW MEDICATION prior to your procedure or admitted to the hospital within 30 days.      If you develop a cold, sore throat, fever or any other illness prior to your procedure date, notify your surgeon immediately.     Arrange for a responsible adult to drive you to and from the hospital.    DO NOT stop taking Plavix or Aspirin unless advised otherwise.     If you are currently taking Fish Oil, Krill oil and/or Vitamin E please DO NOT TAKE FOR A WEEK PRIOR TO PROCEDURE.     If you are diabetic, DO NOT take any of your diabetic pills the morning of your  procedure. Oral diabetic medications may include Glucophage, Prandin, Glyburide, Micronase, Avandia, Glucovance, Precose, Glynase, and Starlix.     Bring a list of daily medications, vitamins, minerals, herbals and nutritional supplements you take. Please include dosages and the times you take them each day.     If you are packing an overnight bag, pack minimal clothing, you will be given hospital sleepwear.   DO NOT bring money, valuables or jewelry. The wedding band is ok.     If you use CPAP machine, bring it to the hospital.     Bring your Photo ID and Insurance cards with you.     Pre-Operative Showering Instructions. ONLY FOR DEVICE PROCEDURE.    The two nights prior to your procedure, take a shower each night using the special antiseptic body scrub (Chlorhexidine Scrub Brush) you received from the office or hospital. This scrub will replace your soap at home, the sponge is pre-filled with soap.     The scrub brushes are single use only and should be discarded after use.     Shampoo your hair with regular shampoo and rinse completely before using the antiseptic scrub brush.     Using the sponge side of the scrub brush to wash your entire body from your neck down, with special attention to your armpits, chest and groin area. DO NOT USE the scrub on your face and avoid any open wounds. Do not use any other soap or wash your skin.    DO NOT USE any lotion, powder, deodorant or perfume of any kind on your skin after you shower.    AFTER THE FIRST NIGHT of using the scrub, change your bed linen sheets to a fresh clean set and clean pajamas for bedtime.    AFTER THE SECOND NIGHT of using the scrub, use clean pajamas for bedtime.    DO NOT SHOWER THE MORNING OF SURGERY, you will be prepped and cleansed at the hospital prior to your procedure.       PLEASE  THE SPONGES AT YOUR MOST CONVENIENT [facility] OFFICE or purchase Chlorhexidine Prep at your local pharmacy.                FAILURE TO FOLLOW ANY  "OF THESE INSTRUCTIONS COULD RESULT IN THE CANCELLATION OF YOUR PROCEDURE      Please call  [phone] ([place]) or [phone] ([place]) if you do not hear from the  by 5:00PM the night before your procedure.    All patients enter through ENTRANCE B.  Parking is available free of charge or park on Parking Deck B.        Thank you,   Jonelle \"Ashanti\" Al  Surgery Coordinator  [facility]   [address] [place] [address]  Teams: [phone]  Fax: [phone]             "

## 2024-08-08 NOTE — TELEPHONE ENCOUNTER
"Stacie,     Please fax the instruction letter and labs (CMP/CBC) to [facility].     Attn: Griselda Suazo  Fx: [phone]    Thanks,  Jonelle \"Ashanti\" Al     "

## 2024-08-19 ENCOUNTER — TELEPHONE (OUTPATIENT)
Age: 84
End: 2024-08-19

## 2024-08-19 ENCOUNTER — APPOINTMENT (OUTPATIENT)
Dept: LAB | Facility: CLINIC | Age: 84
End: 2024-08-19
Payer: MEDICARE

## 2024-08-19 DIAGNOSIS — Z95.0 PRESENCE OF CARDIAC PACEMAKER: ICD-10-CM

## 2024-08-19 LAB
ALBUMIN SERPL BCG-MCNC: 4 G/DL (ref 3.5–5)
ALP SERPL-CCNC: 55 U/L (ref 34–104)
ALT SERPL W P-5'-P-CCNC: 15 U/L (ref 7–52)
ANION GAP SERPL CALCULATED.3IONS-SCNC: 13 MMOL/L (ref 4–13)
AST SERPL W P-5'-P-CCNC: 18 U/L (ref 13–39)
BASOPHILS # BLD AUTO: 0.04 THOUSANDS/ÂΜL (ref 0–0.1)
BASOPHILS NFR BLD AUTO: 0 % (ref 0–1)
BILIRUB SERPL-MCNC: 0.54 MG/DL (ref 0.2–1)
BUN SERPL-MCNC: 24 MG/DL (ref 5–25)
CALCIUM SERPL-MCNC: 9.8 MG/DL (ref 8.4–10.2)
CHLORIDE SERPL-SCNC: 104 MMOL/L (ref 96–108)
CHOLEST SERPL-MCNC: 127 MG/DL
CO2 SERPL-SCNC: 24 MMOL/L (ref 21–32)
CREAT SERPL-MCNC: 1.34 MG/DL (ref 0.6–1.3)
EOSINOPHIL # BLD AUTO: 0.19 THOUSAND/ÂΜL (ref 0–0.61)
EOSINOPHIL NFR BLD AUTO: 2 % (ref 0–6)
ERYTHROCYTE [DISTWIDTH] IN BLOOD BY AUTOMATED COUNT: 15.4 % (ref 11.6–15.1)
GFR SERPL CREATININE-BSD FRML MDRD: 48 ML/MIN/1.73SQ M
GLUCOSE P FAST SERPL-MCNC: 45 MG/DL (ref 65–99)
HCT VFR BLD AUTO: 46.4 % (ref 36.5–49.3)
HDLC SERPL-MCNC: 34 MG/DL
HGB BLD-MCNC: 14.5 G/DL (ref 12–17)
IMM GRANULOCYTES # BLD AUTO: 0.04 THOUSAND/UL (ref 0–0.2)
IMM GRANULOCYTES NFR BLD AUTO: 0 % (ref 0–2)
LDLC SERPL CALC-MCNC: 69 MG/DL (ref 0–100)
LYMPHOCYTES # BLD AUTO: 1.67 THOUSANDS/ÂΜL (ref 0.6–4.47)
LYMPHOCYTES NFR BLD AUTO: 16 % (ref 14–44)
MCH RBC QN AUTO: 28.3 PG (ref 26.8–34.3)
MCHC RBC AUTO-ENTMCNC: 31.3 G/DL (ref 31.4–37.4)
MCV RBC AUTO: 90 FL (ref 82–98)
MONOCYTES # BLD AUTO: 0.73 THOUSAND/ÂΜL (ref 0.17–1.22)
MONOCYTES NFR BLD AUTO: 7 % (ref 4–12)
NEUTROPHILS # BLD AUTO: 7.68 THOUSANDS/ÂΜL (ref 1.85–7.62)
NEUTS SEG NFR BLD AUTO: 75 % (ref 43–75)
NRBC BLD AUTO-RTO: 0 /100 WBCS
PLATELET # BLD AUTO: 254 THOUSANDS/UL (ref 149–390)
PMV BLD AUTO: 11 FL (ref 8.9–12.7)
POTASSIUM SERPL-SCNC: 4.4 MMOL/L (ref 3.5–5.3)
PROT SERPL-MCNC: 7.4 G/DL (ref 6.4–8.4)
RBC # BLD AUTO: 5.13 MILLION/UL (ref 3.88–5.62)
SODIUM SERPL-SCNC: 141 MMOL/L (ref 135–147)
TRIGL SERPL-MCNC: 122 MG/DL
WBC # BLD AUTO: 10.35 THOUSAND/UL (ref 4.31–10.16)

## 2024-08-19 PROCEDURE — 80053 COMPREHEN METABOLIC PANEL: CPT

## 2024-08-19 PROCEDURE — 85025 COMPLETE CBC W/AUTO DIFF WBC: CPT

## 2024-08-19 PROCEDURE — 36415 COLL VENOUS BLD VENIPUNCTURE: CPT

## 2024-08-19 NOTE — TELEPHONE ENCOUNTER
",    Please advise if anything needs to be done.     Patient scheduled on 8/26/24 for DC PM GEN CHANGE/POCKET REVISION.    Thanks,  Jonelle \"Ashanti\" Al     "

## 2024-08-20 NOTE — TELEPHONE ENCOUNTER
2nd attempt.    Pt resides at [facility]. L/m for care coordinator at the clinic, Elise, requesting call back to discuss.

## 2024-08-21 NOTE — TELEPHONE ENCOUNTER
3rd attempt.    Called [facility] and spoke to Martina. Explained about pt's critically low blood sugar and how our office has left 2 messages w/ no return phone call. She stated she was going to give message to pt's nurse and have her call our office back. Provided her w/ direct office call back #.

## 2024-08-22 NOTE — TELEPHONE ENCOUNTER
Not sure what else to do at this point. He resides in a nursing facility and no one had returned any of my calls.

## 2024-08-22 NOTE — TELEPHONE ENCOUNTER
4th attempt.     L/m for spouse also requesting call back since nursing home has not been returning our calls.     Will close out message at time since no one has returned any of our calls.

## 2024-08-23 ENCOUNTER — HOSPITAL ENCOUNTER (EMERGENCY)
Facility: HOSPITAL | Age: 84
Discharge: HOME | End: 2024-08-23
Attending: EMERGENCY MEDICINE
Payer: MEDICARE

## 2024-08-23 ENCOUNTER — APPOINTMENT (EMERGENCY)
Dept: RADIOLOGY | Facility: HOSPITAL | Age: 84
End: 2024-08-23
Attending: EMERGENCY MEDICINE
Payer: MEDICARE

## 2024-08-23 VITALS
DIASTOLIC BLOOD PRESSURE: 77 MMHG | HEART RATE: 68 BPM | RESPIRATION RATE: 18 BRPM | OXYGEN SATURATION: 95 % | SYSTOLIC BLOOD PRESSURE: 136 MMHG | TEMPERATURE: 98.3 F

## 2024-08-23 DIAGNOSIS — M54.50 LOW BACK PAIN: Primary | ICD-10-CM

## 2024-08-23 LAB
ALBUMIN SERPL BCG-MCNC: 4 G/DL (ref 3.5–5)
ALP SERPL-CCNC: 55 U/L (ref 34–104)
ALT SERPL W P-5'-P-CCNC: 15 U/L (ref 7–52)
ANION GAP SERPL CALCULATED.3IONS-SCNC: 9 MMOL/L (ref 4–13)
AST SERPL W P-5'-P-CCNC: 19 U/L (ref 13–39)
BACTERIA UR QL AUTO: NORMAL /HPF
BASOPHILS # BLD AUTO: 0.03 THOUSANDS/ÂΜL (ref 0–0.1)
BASOPHILS NFR BLD AUTO: 0 % (ref 0–1)
BILIRUB SERPL-MCNC: 0.51 MG/DL (ref 0.2–1)
BILIRUB UR QL STRIP: NEGATIVE
BUN SERPL-MCNC: 21 MG/DL (ref 5–25)
CALCIUM SERPL-MCNC: 9.3 MG/DL (ref 8.4–10.2)
CHLORIDE SERPL-SCNC: 104 MMOL/L (ref 96–108)
CLARITY UR: CLEAR
CO2 SERPL-SCNC: 27 MMOL/L (ref 21–32)
COLOR UR: YELLOW
CREAT SERPL-MCNC: 1.24 MG/DL (ref 0.6–1.3)
EOSINOPHIL # BLD AUTO: 0.14 THOUSAND/ÂΜL (ref 0–0.61)
EOSINOPHIL NFR BLD AUTO: 2 % (ref 0–6)
ERYTHROCYTE [DISTWIDTH] IN BLOOD BY AUTOMATED COUNT: 15.5 % (ref 11.6–15.1)
GFR SERPL CREATININE-BSD FRML MDRD: 53 ML/MIN/1.73SQ M
GLUCOSE SERPL-MCNC: 62 MG/DL (ref 65–140)
GLUCOSE UR STRIP-MCNC: NEGATIVE MG/DL
HCT VFR BLD AUTO: 46.3 % (ref 36.5–49.3)
HGB BLD-MCNC: 14.6 G/DL (ref 12–17)
HGB UR QL STRIP.AUTO: NEGATIVE
IMM GRANULOCYTES # BLD AUTO: 0.06 THOUSAND/UL (ref 0–0.2)
IMM GRANULOCYTES NFR BLD AUTO: 1 % (ref 0–2)
KETONES UR STRIP-MCNC: NEGATIVE MG/DL
LEUKOCYTE ESTERASE UR QL STRIP: NEGATIVE
LIPASE SERPL-CCNC: 14 U/L (ref 11–82)
LYMPHOCYTES # BLD AUTO: 1.47 THOUSANDS/ÂΜL (ref 0.6–4.47)
LYMPHOCYTES NFR BLD AUTO: 16 % (ref 14–44)
MCH RBC QN AUTO: 28.2 PG (ref 26.8–34.3)
MCHC RBC AUTO-ENTMCNC: 31.5 G/DL (ref 31.4–37.4)
MCV RBC AUTO: 89 FL (ref 82–98)
MONOCYTES # BLD AUTO: 0.61 THOUSAND/ÂΜL (ref 0.17–1.22)
MONOCYTES NFR BLD AUTO: 7 % (ref 4–12)
NEUTROPHILS # BLD AUTO: 6.73 THOUSANDS/ÂΜL (ref 1.85–7.62)
NEUTS SEG NFR BLD AUTO: 74 % (ref 43–75)
NITRITE UR QL STRIP: NEGATIVE
NON-SQ EPI CELLS URNS QL MICRO: NORMAL /HPF
NRBC BLD AUTO-RTO: 0 /100 WBCS
PH UR STRIP.AUTO: 7.5 [PH] (ref 4.5–8)
PLATELET # BLD AUTO: 246 THOUSANDS/UL (ref 149–390)
PMV BLD AUTO: 9.9 FL (ref 8.9–12.7)
POTASSIUM SERPL-SCNC: 5 MMOL/L (ref 3.5–5.3)
PROT SERPL-MCNC: 7.2 G/DL (ref 6.4–8.4)
PROT UR STRIP-MCNC: ABNORMAL MG/DL
RBC # BLD AUTO: 5.18 MILLION/UL (ref 3.88–5.62)
RBC #/AREA URNS AUTO: NORMAL /HPF
SODIUM SERPL-SCNC: 140 MMOL/L (ref 135–147)
SP GR UR STRIP.AUTO: 1.01 (ref 1–1.03)
UROBILINOGEN UR QL STRIP.AUTO: 0.2 E.U./DL
WBC # BLD AUTO: 9.04 THOUSAND/UL (ref 4.31–10.16)
WBC #/AREA URNS AUTO: NORMAL /HPF

## 2024-08-23 PROCEDURE — 96374 THER/PROPH/DIAG INJ IV PUSH: CPT

## 2024-08-23 PROCEDURE — 99285 EMERGENCY DEPT VISIT HI MDM: CPT | Performed by: EMERGENCY MEDICINE

## 2024-08-23 PROCEDURE — 99284 EMERGENCY DEPT VISIT MOD MDM: CPT

## 2024-08-23 PROCEDURE — 80053 COMPREHEN METABOLIC PANEL: CPT

## 2024-08-23 PROCEDURE — 36415 COLL VENOUS BLD VENIPUNCTURE: CPT

## 2024-08-23 PROCEDURE — 83690 ASSAY OF LIPASE: CPT

## 2024-08-23 PROCEDURE — 85025 COMPLETE CBC W/AUTO DIFF WBC: CPT

## 2024-08-23 PROCEDURE — 74177 CT ABD & PELVIS W/CONTRAST: CPT

## 2024-08-23 PROCEDURE — 81001 URINALYSIS AUTO W/SCOPE: CPT

## 2024-08-23 RX ORDER — HYDROMORPHONE HCL/PF 1 MG/ML
1 SYRINGE (ML) INJECTION ONCE
Status: COMPLETED | OUTPATIENT
Start: 2024-08-23 | End: 2024-08-23

## 2024-08-23 RX ADMIN — IOHEXOL 100 ML: 350 INJECTION, SOLUTION INTRAVENOUS at 13:33

## 2024-08-23 RX ADMIN — HYDROMORPHONE HYDROCHLORIDE 1 MG: 1 INJECTION, SOLUTION INTRAMUSCULAR; INTRAVENOUS; SUBCUTANEOUS at 12:40

## 2024-08-23 NOTE — ED PROVIDER NOTES
History  Chief Complaint   Patient presents with    Flank Pain     R sided flank pain started 2 days, took prescription oxy.      HPI    Patient is an 84-year-old male with a history of CAD, DM, sick sinus syndrome, A-fib on Pradaxa who presents with right flank and right lower quadrant pain.  Patient reports the pain began in his right flank about 3 days ago and has been persistent since.  Walking and movement make the pain worse. He has tried oxycodone without relief.  His last oxy was around 9 AM this morning.  He has also had intermittent right lower quadrant pain.  He has been able to tolerate p.o. but has had a loss of appetite. The pain does not worsen after eating.  He has a history of multiple hernia surgeries.  His last BM was yesterday and was normal.  He denies nausea, vomiting, and diarrhea.  He denies bloody stools.    Prior to Admission Medications   Prescriptions Last Dose Informant Patient Reported? Taking?   Ascorbic Acid (VITAMIN C PO)  Self Yes No   Sig: Take 2 tablets by mouth every morning   CHOLECALCIFEROL PO  Self Yes No   Sig: Take 2 tablets by mouth every morning   FERROUS FUMARATE PO  Self Yes No   Sig: Take 1 tablet by mouth every morning   acetaminophen (TYLENOL) 500 mg tablet  Self Yes No   Sig: Take 500 mg by mouth every 6 (six) hours as needed   albuterol (PROVENTIL HFA,VENTOLIN HFA) 90 mcg/act inhaler  Self Yes No   Sig: Inhale 2 puffs every 6 (six) hours as needed   allopurinol (ZYLOPRIM) 100 mg tablet  Self Yes No   Sig: Take 100 mg by mouth daily   azelastine (ASTELIN) 0.1 % nasal spray   No No   Si spray into each nostril 2 (two) times a day Use in each nostril as directed   bisacodyl (DULCOLAX) 5 mg EC tablet  Self No No   Sig: Take 1 tablet (5 mg total) by mouth daily as needed for constipation   colchicine (COLCRYS) 0.6 mg tablet  Self Yes No   Sig: Take 0.6 mg by mouth 2 (two) times a day   cyanocobalamin (VITAMIN B-12) 1000 MCG tablet  Self Yes No   Sig: Take 1,000 mcg  by mouth   dabigatran etexilate (PRADAXA) 150 mg capsu  Self No No   Sig: Take 1 capsule (150 mg total) by mouth every 12 (twelve) hours   ezetimibe (ZETIA) 10 mg tablet  Self Yes No   Sig: Take 10 mg by mouth daily   furosemide (LASIX) 20 mg tablet  Self Yes No   Sig: Take 20 mg by mouth daily   gabapentin (Neurontin) 300 mg capsule  Self No No   Sig: Take 1 capsule (300 mg total) by mouth 3 (three) times a day   insulin degludec (TRESIBA) 100 units/mL injection pen  Self Yes No   Sig: Inject 80 Units under the skin   ipratropium (ATROVENT) 0.03 % nasal spray   No No   Si sprays into each nostril every 12 (twelve) hours   levothyroxine 112 mcg tablet  Self Yes No   lisinopril (ZESTRIL) 5 mg tablet  Self Yes No   loratadine (CLARITIN) 10 mg tablet  Self Yes No   Sig: Take 10 mg by mouth daily   metoprolol succinate (TOPROL-XL) 50 mg 24 hr tablet  Self No No   Sig: Take 1 tablet (50 mg total) by mouth daily Do not start before 2022.   pantoprazole (PROTONIX) 40 mg tablet  Self No No   Sig: Take 1 tablet (40 mg total) by mouth daily   polyethylene glycol (MIRALAX) 17 g packet  Self No No   Sig: Take 17 g by mouth daily   tamsulosin (FLOMAX) 0.4 mg  Self No No   Sig: Take 1 capsule (0.4 mg total) by mouth daily with dinner      Facility-Administered Medications: None       Past Medical History:   Diagnosis Date    Diabetes (HCC)     Essential tremor     Hyperlipidemia     Hypertension     Obstructive sleep apnea     Paroxysmal atrial fibrillation (HCC)     Sick sinus syndrome (HCC)        Past Surgical History:   Procedure Laterality Date    CT NEEDLE BIOPSY LUNG  2021    CT NEEDLE BIOPSY LUNG  2021    FL GUIDED NEEDLE PLAC BX/ASP/INJ  3/11/2024       History reviewed. No pertinent family history.  I have reviewed and agree with the history as documented.    E-Cigarette/Vaping    E-Cigarette Use Never User      E-Cigarette/Vaping Substances     Social History     Tobacco Use    Smoking status:  Former     Current packs/day: 0.00     Average packs/day: 2.0 packs/day for 7.0 years (14.0 ttl pk-yrs)     Types: Cigarettes     Start date:      Quit date: 1965     Years since quittin.6    Smokeless tobacco: Never   Vaping Use    Vaping status: Never Used   Substance Use Topics    Alcohol use: Not Currently    Drug use: Never        Review of Systems   Constitutional:  Negative for chills and fever.   HENT:  Negative for congestion and sore throat.    Respiratory:  Negative for cough and shortness of breath.    Cardiovascular:  Negative for chest pain and palpitations.   Gastrointestinal:  Positive for abdominal pain. Negative for vomiting.   Genitourinary:  Negative for dysuria and hematuria.   Musculoskeletal:  Positive for back pain. Negative for neck pain.   Neurological:  Negative for syncope and headaches.   All other systems reviewed and are negative.      Physical Exam  ED Triage Vitals   Temperature Pulse Respirations Blood Pressure SpO2   24 1109 24 1109 24 1109 24 1110 24 1109   98.3 °F (36.8 °C) 66 20 140/94 96 %      Temp src Heart Rate Source Patient Position - Orthostatic VS BP Location FiO2 (%)   -- 24 1109 -- -- --    Monitor         Pain Score       24 1109       9             Orthostatic Vital Signs  Vitals:    24 1109 24 1110 24 1245 24 1415   BP:  140/94 163/98 136/77   Pulse: 66  72 68       Physical Exam  Vitals and nursing note reviewed.   HENT:      Head: Normocephalic and atraumatic.      Nose: No congestion or rhinorrhea.      Mouth/Throat:      Mouth: Mucous membranes are moist.   Eyes:      General:         Right eye: No discharge.         Left eye: No discharge.      Extraocular Movements: Extraocular movements intact.   Cardiovascular:      Rate and Rhythm: Normal rate and regular rhythm.   Pulmonary:      Effort: Pulmonary effort is normal. No respiratory distress.      Breath sounds: Normal breath sounds.  No wheezing or rhonchi.   Abdominal:      Palpations: Abdomen is soft.      Tenderness: There is abdominal tenderness.      Comments: RLQ and right flank tenderness. No erythema or ecchymosis.    Musculoskeletal:      Cervical back: Normal range of motion.      Right lower leg: Edema present.      Left lower leg: Edema present.   Skin:     General: Skin is warm and dry.      Capillary Refill: Capillary refill takes less than 2 seconds.   Neurological:      Mental Status: He is alert.   Psychiatric:         Mood and Affect: Mood normal.         ED Medications  Medications   HYDROmorphone (DILAUDID) injection 1 mg (1 mg Intravenous Given 8/23/24 1240)   iohexol (OMNIPAQUE) 350 MG/ML injection (SINGLE-DOSE) 100 mL (100 mL Intravenous Given 8/23/24 1333)       Diagnostic Studies  Results Reviewed       Procedure Component Value Units Date/Time    Urine Microscopic [[id number]]  (Normal) Collected: 08/23/24 1504    Lab Status: Final result Specimen: Urine, Other Updated: 08/23/24 1634     RBC, UA None Seen /hpf      WBC, UA None Seen /hpf      Epithelial Cells None Seen /hpf      Bacteria, UA None Seen /hpf     Urine Macroscopic, POC [[id number]]  (Abnormal) Collected: 08/23/24 1504    Lab Status: Final result Specimen: Urine Updated: 08/23/24 1505     Color, UA Yellow     Clarity, UA Clear     pH, UA 7.5     Leukocytes, UA Negative     Nitrite, UA Negative     Protein, UA 30 (1+) mg/dl      Glucose, UA Negative mg/dl      Ketones, UA Negative mg/dl      Urobilinogen, UA 0.2 E.U./dl      Bilirubin, UA Negative     Occult Blood, UA Negative     Specific [place], UA 1.015    Comprehensive metabolic panel [[id number]]  (Abnormal) Collected: 08/23/24 1241    Lab Status: Final result Specimen: Blood from Arm, Right Updated: 08/23/24 1314     Sodium 140 mmol/L      Potassium 5.0 mmol/L      Chloride 104 mmol/L      CO2 27 mmol/L      ANION GAP 9 mmol/L      BUN 21 mg/dL      Creatinine 1.24 mg/dL      Glucose 62 mg/dL       Calcium 9.3 mg/dL      AST 19 U/L      ALT 15 U/L      Alkaline Phosphatase 55 U/L      Total Protein 7.2 g/dL      Albumin 4.0 g/dL      Total Bilirubin 0.51 mg/dL      eGFR 53 ml/min/1.73sq m     Narrative:      National Kid[employer] guidelines for Chronic Kidney Disease (CKD):     Stage 1 with normal or high GFR (GFR > 90 mL/min/1.73 square meters)    Stage 2 Mild CKD (GFR = 60-89 mL/min/1.73 square meters)    Stage 3A Moderate CKD (GFR = 45-59 mL/min/1.73 square meters)    Stage 3B Moderate CKD (GFR = 30-44 mL/min/1.73 square meters)    Stage 4 Severe CKD (GFR = 15-29 mL/min/1.73 square meters)    Stage 5 End Stage CKD (GFR <15 mL/min/1.73 square meters)  Note: GFR calculation is accurate only with a steady state creatinine    Lipase [[id number]]  (Normal) Collected: 08/23/24 1241    Lab Status: Final result Specimen: Blood from Arm, Right Updated: 08/23/24 1314     Lipase 14 u/L     CBC and differential [[id number]]  (Abnormal) Collected: 08/23/24 1241    Lab Status: Final result Specimen: Blood from Arm, Right Updated: 08/23/24 1256     WBC 9.04 Thousand/uL      RBC 5.18 Million/uL      Hemoglobin 14.6 g/dL      Hematocrit 46.3 %      MCV 89 fL      MCH 28.2 pg      MCHC 31.5 g/dL      RDW 15.5 %      MPV 9.9 fL      Platelets 246 Thousands/uL      nRBC 0 /100 WBCs      Segmented % 74 %      Immature Grans % 1 %      Lymphocytes % 16 %      Monocytes % 7 %      Eosinophils Relative 2 %      Basophils Relative 0 %      Absolute Neutrophils 6.73 Thousands/µL      Absolute Immature Grans 0.06 Thousand/uL      Absolute Lymphocytes 1.47 Thousands/µL      Absolute Monocytes 0.61 Thousand/µL      Eosinophils Absolute 0.14 Thousand/µL      Basophils Absolute 0.03 Thousands/µL                    CT abdomen pelvis with contrast   Final Result by Javy Merchant MD (08/23 1433)      1. No acute findings in the abdomen or pelvis to explain the patient's symptoms.         Resident: Darvin Page      I, the  attending radiologist, have reviewed the images and agree with the final report above.      Workstation performed: [id number]               Procedures  Procedures      ED Course  ED Course as of 08/25/24 1028   Fri Aug 23, 2024   1409 Patient's symptoms resolved following medications. Resting comfortably in bed.   1511 Leukocytes, UA: Negative   1511 Nitrite, UA: Negative                                       Medical Decision Making  Differential includes ureterolithiasis, appendicitis, pyelonephritis, diverticulitis, SBO, cholecystitis.  Will order CBC, CMP, lipase, UA, and CT abdomen pelvis.  Will treat symptomatically with Dilaudid.    Labs are unremarkable. UA is negative for infection. CT is negative for acute findings.  Patient's pain has resolved following medications.  Pain is likely in setting of musculoskeletal strain.  He was told to follow-up with PCP.  He was given return precautions and discharged in stable condition.          Amount and/or Complexity of Data Reviewed  Labs: ordered. Decision-making details documented in ED Course.  Radiology: ordered.    Risk  Prescription drug management.          Disposition  Final diagnoses:   Low back pain     Time reflects when diagnosis was documented in both MDM as applicable and the Disposition within this note       Time User Action Codes Description Comment    8/23/2024  3:12 PM Jessy Pedroza Add [R10.9] Flank pain     8/23/2024  3:13 PM Jessy Pedroza Remove [R10.9] Flank pain     8/23/2024  3:13 PM Jessy Pedroza Add [M54.50] Low back pain           ED Disposition       ED Disposition   Discharge    Condition   Stable    Date/Time   Fri Aug 23, 2024  3:12 PM    Comment   Lyndon Chairez discharge to home/self care.                   Follow-up Information       Follow up With Specialties Details Why Contact Info    Adrianna Garcia MD Palliative Care   [address] [facility] [address] [phone]              Discharge Medication List as of  8/23/2024  3:13 PM        CONTINUE these medications which have NOT CHANGED    Details   acetaminophen (TYLENOL) 500 mg tablet Take 500 mg by mouth every 6 (six) hours as needed, Historical Med      albuterol (PROVENTIL HFA,VENTOLIN HFA) 90 mcg/act inhaler Inhale 2 puffs every 6 (six) hours as needed, Historical Med      allopurinol (ZYLOPRIM) 100 mg tablet Take 100 mg by mouth daily, Historical Med      Ascorbic Acid (VITAMIN C PO) Take 2 tablets by mouth every morning, Historical Med      azelastine (ASTELIN) 0.1 % nasal spray 1 spray into each nostril 2 (two) times a day Use in each nostril as directed, Starting Thu 8/3/2023, Until Sat 9/2/2023, Normal      bisacodyl (DULCOLAX) 5 mg EC tablet Take 1 tablet (5 mg total) by mouth daily as needed for constipation, Starting Mon 11/28/2022, Print      CHOLECALCIFEROL PO Take 2 tablets by mouth every morning, Historical Med      colchicine (COLCRYS) 0.6 mg tablet Take 0.6 mg by mouth 2 (two) times a day, Historical Med      cyanocobalamin (VITAMIN B-12) 1000 MCG tablet Take 1,000 mcg by mouth, Historical Med      dabigatran etexilate (PRADAXA) 150 mg capsu Take 1 capsule (150 mg total) by mouth every 12 (twelve) hours, Starting Sun 10/30/2022, No Print      ezetimibe (ZETIA) 10 mg tablet Take 10 mg by mouth daily, Starting Thu 6/2/2022, Until Fri 6/2/2023, Historical Med      FERROUS FUMARATE PO Take 1 tablet by mouth every morning, Historical Med      furosemide (LASIX) 20 mg tablet Take 20 mg by mouth daily, Historical Med      gabapentin (Neurontin) 300 mg capsule Take 1 capsule (300 mg total) by mouth 3 (three) times a day, Starting Tue 5/28/2024, Normal      insulin degludec (TRESIBA) 100 units/mL injection pen Inject 80 Units under the skin, Historical Med      ipratropium (ATROVENT) 0.03 % nasal spray 2 sprays into each nostril every 12 (twelve) hours, Starting Fri 1/20/2023, Until Sun 2/19/2023, Normal      levothyroxine 112 mcg tablet Starting Mon 9/19/2022,  Historical Med      lisinopril (ZESTRIL) 5 mg tablet Starting Tue 9/20/2022, Historical Med      loratadine (CLARITIN) 10 mg tablet Take 10 mg by mouth daily, Historical Med      metoprolol succinate (TOPROL-XL) 50 mg 24 hr tablet Take 1 tablet (50 mg total) by mouth daily Do not start before October 31, 2022., Starting Mon 10/31/2022, No Print      pantoprazole (PROTONIX) 40 mg tablet Take 1 tablet (40 mg total) by mouth daily, Starting Fri 3/24/2023, Normal      polyethylene glycol (MIRALAX) 17 g packet Take 17 g by mouth daily, Starting Mon 11/28/2022, Print      tamsulosin (FLOMAX) 0.4 mg Take 1 capsule (0.4 mg total) by mouth daily with dinner, Starting Sun 10/30/2022, No Print           No discharge procedures on file.    PDMP Review         Value Time User    PDMP Reviewed  Yes 12/29/2023 10:07 AM Will Heidi Slater MD             ED Provider  Attending physically available and evaluated Lyndon Chairez. I managed the patient along with the ED Attending.    Electronically Signed by           Jessy Pedroza MD  08/25/24 [id number]

## 2024-08-23 NOTE — PROGRESS NOTES
spoke with leona regarding procedure instructions for monday. he's currently in the ER with plan for admission vs. discharge. cath lab staff aware. patient stated cardiology office was supposed to setup transportation. cath lab aware will talk to marilee.

## 2024-08-23 NOTE — ED ATTENDING ATTESTATION
8/23/2024  IPaulo DO, saw and evaluated the patient. I have discussed the patient with the resident/non-physician practitioner and agree with the resident's/non-physician practitioner's findings, Plan of Care, and MDM as documented in the resident's/non-physician practitioner's note, except where noted. All available labs and Radiology studies were reviewed.  I was present for key portions of any procedure(s) performed by the resident/non-physician practitioner and I was immediately available to provide assistance.       At this point I agree with the current assessment done in the Emergency Department.  I have conducted an independent evaluation of this patient a history and physical is as follows:    84-year-old male presents for complaint of right flank pain radiating to his right hip and into his right groin for the past 3 days.  Symptoms have been worsening do not appear to be associated with dietary intake, bowel movement or urination.  He has increased pain with palpation and ambulation.  He has OxyContin available due to prior knee surgery.  He took 1 without relief.  He has no associated nausea, vomiting, diarrhea or constipation but has loss of appetite.    No hematochezia, melena or hematemesis. No recognized change in symptoms w/PO intake, BM or voiding. No recent travel or similar sick contacts. Denies f/c, CP, SOB. 12 system ROS o/w negative.    PE: Mild distress, appears uncomfortable, alert; PERRL, EOMI; MMM, no posterior oropharyngeal exudate, edema or erythema; HRR, no murmur; lungs CTA w/o w/r/r, POx 96% on RA (nl); abdomen s/nd, TTP in right flank, right lower quadrant and right groin as well as over the right iliac crest without r/g/r, (-) Rovsing's, nl BS in all 4 quadrant; (-) LE edema or calf TTP, FROM extremities x4; skin p/w/d; CNs GI/NF, oriented.    MDM/DDx: Abdominal/flank pain - ureteral stone, pyelonephritis, right-sided diverticular disease, colitis, atypical radiculopathy,  atypical biliary disease.     I independently reviewed and interpreted ordered labs from this encounter.    A/P: Will check CT a/p, abdominal labs, urine, treat symptoms, reevaluate for further work up and disposition.    ED Course         Critical Care Time  Procedures

## 2024-08-23 NOTE — DISCHARGE INSTRUCTIONS
You were seen in the Emergency Department today for  back pain.    Please follow up with your primary care doctor,   Please return to the Emergency Department if you experience worsening of your current symptoms, fever, recurrent vomiting, severe pain, or any other concerning symptoms.

## 2024-08-25 ENCOUNTER — ANESTHESIA EVENT (OUTPATIENT)
Dept: NON INVASIVE DIAGNOSTICS | Facility: HOSPITAL | Age: 84
End: 2024-08-25
Payer: MEDICARE

## 2024-08-26 ENCOUNTER — HOSPITAL ENCOUNTER (OUTPATIENT)
Facility: HOSPITAL | Age: 84
Setting detail: OUTPATIENT SURGERY
Discharge: HOME | End: 2024-08-26
Attending: INTERNAL MEDICINE | Admitting: INTERNAL MEDICINE
Payer: MEDICARE

## 2024-08-26 ENCOUNTER — ANESTHESIA (OUTPATIENT)
Dept: NON INVASIVE DIAGNOSTICS | Facility: HOSPITAL | Age: 84
End: 2024-08-26
Payer: MEDICARE

## 2024-08-26 VITALS
HEIGHT: 71 IN | HEART RATE: 69 BPM | SYSTOLIC BLOOD PRESSURE: 165 MMHG | TEMPERATURE: 97.9 F | RESPIRATION RATE: 16 BRPM | DIASTOLIC BLOOD PRESSURE: 76 MMHG | WEIGHT: 285 LBS | BODY MASS INDEX: 39.9 KG/M2 | OXYGEN SATURATION: 94 %

## 2024-08-26 DIAGNOSIS — Z95.0 PRESENCE OF CARDIAC PACEMAKER: ICD-10-CM

## 2024-08-26 LAB
ANION GAP SERPL CALCULATED.3IONS-SCNC: 11 MMOL/L (ref 4–13)
ATRIAL RATE: 141 BPM
ATRIAL RATE: 68 BPM
ATRIAL RATE: 76 BPM
BUN SERPL-MCNC: 23 MG/DL (ref 5–25)
CALCIUM SERPL-MCNC: 9 MG/DL (ref 8.4–10.2)
CHLORIDE SERPL-SCNC: 106 MMOL/L (ref 96–108)
CO2 SERPL-SCNC: 23 MMOL/L (ref 21–32)
CREAT SERPL-MCNC: 1.33 MG/DL (ref 0.6–1.3)
ERYTHROCYTE [DISTWIDTH] IN BLOOD BY AUTOMATED COUNT: 15.5 % (ref 11.6–15.1)
GFR SERPL CREATININE-BSD FRML MDRD: 49 ML/MIN/1.73SQ M
GLUCOSE P FAST SERPL-MCNC: 106 MG/DL (ref 65–99)
GLUCOSE SERPL-MCNC: 103 MG/DL (ref 65–140)
GLUCOSE SERPL-MCNC: 106 MG/DL (ref 65–140)
HCT VFR BLD AUTO: 41.4 % (ref 36.5–49.3)
HGB BLD-MCNC: 13.2 G/DL (ref 12–17)
INR PPP: 1.55 (ref 0.85–1.19)
MAGNESIUM SERPL-MCNC: 1.8 MG/DL (ref 1.9–2.7)
MCH RBC QN AUTO: 28 PG (ref 26.8–34.3)
MCHC RBC AUTO-ENTMCNC: 31.9 G/DL (ref 31.4–37.4)
MCV RBC AUTO: 88 FL (ref 82–98)
P AXIS: 63 DEGREES
PLATELET # BLD AUTO: 240 THOUSANDS/UL (ref 149–390)
PMV BLD AUTO: 10.3 FL (ref 8.9–12.7)
POTASSIUM SERPL-SCNC: 4.2 MMOL/L (ref 3.5–5.3)
PR INTERVAL: 182 MS
PROTHROMBIN TIME: 18.8 SECONDS (ref 12.3–15)
QRS AXIS: 11 DEGREES
QRS AXIS: 77 DEGREES
QRS AXIS: 77 DEGREES
QRSD INTERVAL: 172 MS
QRSD INTERVAL: 92 MS
QRSD INTERVAL: 92 MS
QT INTERVAL: 374 MS
QT INTERVAL: 404 MS
QT INTERVAL: 454 MS
QTC INTERVAL: 426 MS
QTC INTERVAL: 432 MS
QTC INTERVAL: 510 MS
RBC # BLD AUTO: 4.71 MILLION/UL (ref 3.88–5.62)
SODIUM SERPL-SCNC: 140 MMOL/L (ref 135–147)
T WAVE AXIS: 107 DEGREES
T WAVE AXIS: 51 DEGREES
T WAVE AXIS: 67 DEGREES
VENTRICULAR RATE: 69 BPM
VENTRICULAR RATE: 76 BPM
VENTRICULAR RATE: 78 BPM
WBC # BLD AUTO: 8.97 THOUSAND/UL (ref 4.31–10.16)

## 2024-08-26 PROCEDURE — 33222 RELOCATION POCKET PACEMAKER: CPT | Performed by: INTERNAL MEDICINE

## 2024-08-26 PROCEDURE — 83735 ASSAY OF MAGNESIUM: CPT | Performed by: PHYSICIAN ASSISTANT

## 2024-08-26 PROCEDURE — 82948 REAGENT STRIP/BLOOD GLUCOSE: CPT

## 2024-08-26 PROCEDURE — 93010 ELECTROCARDIOGRAM REPORT: CPT | Performed by: INTERNAL MEDICINE

## 2024-08-26 PROCEDURE — 33228 REMV&REPLC PM GEN DUAL LEAD: CPT | Performed by: INTERNAL MEDICINE

## 2024-08-26 PROCEDURE — 80048 BASIC METABOLIC PNL TOTAL CA: CPT | Performed by: PHYSICIAN ASSISTANT

## 2024-08-26 PROCEDURE — 85610 PROTHROMBIN TIME: CPT | Performed by: PHYSICIAN ASSISTANT

## 2024-08-26 PROCEDURE — C1785 PMKR, DUAL, RATE-RESP: HCPCS | Performed by: INTERNAL MEDICINE

## 2024-08-26 PROCEDURE — 85027 COMPLETE CBC AUTOMATED: CPT | Performed by: PHYSICIAN ASSISTANT

## 2024-08-26 PROCEDURE — 93005 ELECTROCARDIOGRAM TRACING: CPT

## 2024-08-26 PROCEDURE — NC001 PR NO CHARGE: Performed by: PHYSICIAN ASSISTANT

## 2024-08-26 DEVICE — IPG W1DR01 AZURE XT DR MRI USA
Type: IMPLANTABLE DEVICE | Site: CHEST | Status: FUNCTIONAL
Brand: AZURE™ XT DR MRI SURESCAN™

## 2024-08-26 DEVICE — ENVELOPE CMRM6122 ABSORB MED MR
Type: IMPLANTABLE DEVICE | Site: CHEST | Status: FUNCTIONAL
Brand: TYRX™

## 2024-08-26 RX ORDER — ONDANSETRON 2 MG/ML
INJECTION INTRAMUSCULAR; INTRAVENOUS AS NEEDED
Status: DISCONTINUED | OUTPATIENT
Start: 2024-08-26 | End: 2024-08-26

## 2024-08-26 RX ORDER — HYDROMORPHONE HCL IN WATER/PF 6 MG/30 ML
0.2 PATIENT CONTROLLED ANALGESIA SYRINGE INTRAVENOUS
Status: DISCONTINUED | OUTPATIENT
Start: 2024-08-26 | End: 2024-08-26 | Stop reason: HOSPADM

## 2024-08-26 RX ORDER — ONDANSETRON 2 MG/ML
4 INJECTION INTRAMUSCULAR; INTRAVENOUS ONCE AS NEEDED
Status: DISCONTINUED | OUTPATIENT
Start: 2024-08-26 | End: 2024-08-26 | Stop reason: HOSPADM

## 2024-08-26 RX ORDER — FENTANYL CITRATE/PF 50 MCG/ML
25 SYRINGE (ML) INJECTION
Status: DISCONTINUED | OUTPATIENT
Start: 2024-08-26 | End: 2024-08-26 | Stop reason: HOSPADM

## 2024-08-26 RX ORDER — FENTANYL CITRATE 50 UG/ML
INJECTION, SOLUTION INTRAMUSCULAR; INTRAVENOUS AS NEEDED
Status: DISCONTINUED | OUTPATIENT
Start: 2024-08-26 | End: 2024-08-26

## 2024-08-26 RX ORDER — ACETAMINOPHEN 325 MG/1
650 TABLET ORAL EVERY 4 HOURS PRN
Status: DISCONTINUED | OUTPATIENT
Start: 2024-08-26 | End: 2024-08-26 | Stop reason: HOSPADM

## 2024-08-26 RX ORDER — GENTAMICIN 40 MG/ML
INJECTION, SOLUTION INTRAMUSCULAR; INTRAVENOUS CODE/TRAUMA/SEDATION MEDICATION
Status: DISCONTINUED | OUTPATIENT
Start: 2024-08-26 | End: 2024-08-26 | Stop reason: HOSPADM

## 2024-08-26 RX ORDER — SODIUM CHLORIDE 9 MG/ML
20 INJECTION, SOLUTION INTRAVENOUS CONTINUOUS
Status: DISCONTINUED | OUTPATIENT
Start: 2024-08-26 | End: 2024-08-26 | Stop reason: HOSPADM

## 2024-08-26 RX ORDER — LIDOCAINE HYDROCHLORIDE 10 MG/ML
INJECTION, SOLUTION EPIDURAL; INFILTRATION; INTRACAUDAL; PERINEURAL AS NEEDED
Status: DISCONTINUED | OUTPATIENT
Start: 2024-08-26 | End: 2024-08-26

## 2024-08-26 RX ORDER — LIDOCAINE HYDROCHLORIDE 10 MG/ML
INJECTION, SOLUTION EPIDURAL; INFILTRATION; INTRACAUDAL; PERINEURAL CODE/TRAUMA/SEDATION MEDICATION
Status: DISCONTINUED | OUTPATIENT
Start: 2024-08-26 | End: 2024-08-26 | Stop reason: HOSPADM

## 2024-08-26 RX ORDER — PROPOFOL 10 MG/ML
INJECTION, EMULSION INTRAVENOUS AS NEEDED
Status: DISCONTINUED | OUTPATIENT
Start: 2024-08-26 | End: 2024-08-26

## 2024-08-26 RX ADMIN — FENTANYL CITRATE 25 MCG: 50 INJECTION INTRAMUSCULAR; INTRAVENOUS at 08:38

## 2024-08-26 RX ADMIN — CEFAZOLIN 3000 MG: 1 INJECTION, POWDER, FOR SOLUTION INTRAMUSCULAR; INTRAVENOUS at 08:10

## 2024-08-26 RX ADMIN — SODIUM CHLORIDE: 0.9 INJECTION, SOLUTION INTRAVENOUS at 07:52

## 2024-08-26 RX ADMIN — FENTANYL CITRATE 25 MCG: 50 INJECTION INTRAMUSCULAR; INTRAVENOUS at 09:53

## 2024-08-26 RX ADMIN — ONDANSETRON 4 MG: 2 INJECTION INTRAMUSCULAR; INTRAVENOUS at 09:01

## 2024-08-26 RX ADMIN — HYDROMORPHONE HYDROCHLORIDE 0.2 MG: 0.2 INJECTION, SOLUTION INTRAMUSCULAR; INTRAVENOUS; SUBCUTANEOUS at 10:05

## 2024-08-26 RX ADMIN — PROPOFOL 150 MG: 10 INJECTION, EMULSION INTRAVENOUS at 08:14

## 2024-08-26 RX ADMIN — LIDOCAINE HYDROCHLORIDE 50 MG: 10 INJECTION, SOLUTION EPIDURAL; INFILTRATION; INTRACAUDAL; PERINEURAL at 08:13

## 2024-08-26 RX ADMIN — PHENYLEPHRINE HYDROCHLORIDE 40 MCG/MIN: 10 INJECTION INTRAVENOUS at 08:23

## 2024-08-26 RX ADMIN — PROPOFOL 50 MG: 10 INJECTION, EMULSION INTRAVENOUS at 08:16

## 2024-08-26 RX ADMIN — HYDROMORPHONE HYDROCHLORIDE 0.2 MG: 0.2 INJECTION, SOLUTION INTRAMUSCULAR; INTRAVENOUS; SUBCUTANEOUS at 10:10

## 2024-08-26 RX ADMIN — HYDROMORPHONE HYDROCHLORIDE 0.2 MG: 0.2 INJECTION, SOLUTION INTRAMUSCULAR; INTRAVENOUS; SUBCUTANEOUS at 10:16

## 2024-08-26 RX ADMIN — FENTANYL CITRATE 25 MCG: 50 INJECTION INTRAMUSCULAR; INTRAVENOUS at 09:47

## 2024-08-26 RX ADMIN — FENTANYL CITRATE 25 MCG: 50 INJECTION INTRAMUSCULAR; INTRAVENOUS at 08:28

## 2024-08-26 RX ADMIN — HYDROMORPHONE HYDROCHLORIDE 0.2 MG: 0.2 INJECTION, SOLUTION INTRAMUSCULAR; INTRAVENOUS; SUBCUTANEOUS at 09:59

## 2024-08-26 RX ADMIN — FENTANYL CITRATE 25 MCG: 50 INJECTION INTRAMUSCULAR; INTRAVENOUS at 08:42

## 2024-08-26 RX ADMIN — FENTANYL CITRATE 25 MCG: 50 INJECTION INTRAMUSCULAR; INTRAVENOUS at 08:52

## 2024-08-26 NOTE — Clinical Note
The PACER GENERATOR MARIAM XT DR ZANE MARTINEZ - [id number] device was inserted. The leads were placed into the connector and visually verified to be in correct position. Injury current obtained.

## 2024-08-26 NOTE — DISCHARGE INSTR - AVS FIRST PAGE
Medication Adjustments:  - Restart Pradaxa tomorrow 8/27/24. DO NOT restart same day as procedure     Please refer to post pacemaker implantation discharge instructions and restrictions and your pacemaker booklet/temporary card.     Keep incision dry for one week. Leave outer bandage in place for 1 week - it is water proof, and as long as it is fully adhered to your skin you may shower with it.  If it appears as though the bandage is coming off and/or there is any communication to the area of device incision, please then keep the whole area dry for the remaining week.  After 1 week, please remove by pulling all edges away from the center of the bandage. After the bandage is removed, you may then shower normally and get the area wet with soap and water, no scrubbing, and pat dry. Do not use lotions/powders/creams on incision.    Please call the office if you notice redness, swelling, bleeding, or drainage from incision or if you develop fevers.       AFTER PACEMAKER CARE:    If you have any questions, please call [phone] to speak with a nurse (8:30am-4pm, or [phone] after hours). For appointments, please call [phone].      WHAT YOU SHOULD KNOW:   A pacemaker is a small, battery-powered device that is placed under your skin in your upper chest area with wires placed through a vein that lead directly into the heart. It helps regulate your heart rate and prevent your heart from beating too slowly.                 AFTER YOU LEAVE:     Medicines:     Pain medicine:  You may need medicine to take away or decrease pain.     Learn how to take your medicine. Ask what medicine and how much you should take. Be sure you know how, when, and how often to take it. Usually Over the counter pain medicine is sufficient to control pain (Acetominophen or Ibuprofen) Ask your doctor if you may take these. If this does not control your pain, narcotic pain killers may be prescribed, please call if you need prescription.      Do not wait until the pain is severe before you take your medicine. Tell caregivers if your pain does not decrease.    Pain medicine can make you dizzy or sleepy. Prevent falls by calling someone when you get out of bed or if you need help.        Take your medicine as directed.  Call your healthcare provider if you think your medicine is not helping or if you have side effects. Tell him if you are allergic to any medicine.    Follow up with your cardiologist after your procedure:  You will need a follow-up visit approximately 2 weeks after you leave the hospital. Your cardiologist will check your wound and make sure that your pacemaker is working correctly.     Follow the instructions to check your pacemaker:  Your cardiologist or primary healthcare provider will check your pacemaker and the battery regularly.  He will use a computer to check your pacemaker over the telephone or wireless device which will be given to you.     Pacemaker batteries usually last 8 to 10 years. The pacemaker unit will be replaced when the battery gets low. This is a simpler procedure than the original one to implant your pacemaker.    Wound care:  Keep your incision dry for one week.  Do not use lotions/powders/creams on incision. Leave outer bandage in place for 1 week - it is water proof, and as long as it is fully adhered to your skin you may shower with it.  If it appears as though the bandage is coming off and/or there is any communication to the area of device incision, please then keep the whole area dry for the remaining week.  After 1 week, please remove by pulling all edges away from the center of the bandage. Please call the office if you notice redness, swelling, bleeding, or drainage from incision or if you develop fevers.       Activity:   You may resume your normal activity following a generator change  Driving: you are able to drive 48 hours post PPM implant   Sports:  Ask your caregiver when it is okay to play  tennis, golf, basketball, or any sport that requires you to lift your arms. Do not play full contact sports, such as football, that could damage your pacemaker. Ask your cardiologist or primary healthcare provider how much and what kinds of physical activity are safe for you.    Living with a pacemaker:   Tell all caregivers you have a pacemaker:  This includes surgeons, radiologists, and medical technicians. You may want to wear a medical alert ID bracelet or necklace that states that you have a pacemaker.    Carry your pacemaker ID card:  Make sure you receive a pacemaker ID card. Carry it with you at all times. It lists important information about your pacemaker. Show it to airport security if you travel.     Avoid electrical interference:  Avoid welding equipment and other equipment with large magnets or electric fields. These things could interfere with how your pacemaker works. Use your cell phone on the ear opposite from your pacemaker. Do not carry your cell phone in your shirt pocket over your chest.     Some Pacemakers are MRI safe. Ask you doctor if it is safe to proceed with MRI and let the radiologist and staff know you have a pacemaker.     Do not touch the skin around your pacemaker:  This can cause damage to the lead wires or move the pacemaker unit from where it should be.    Contact your cardiologist or primary healthcare provider if:   The area around your pacemaker has increasing amount of pain after surgery. The pain should improve over first few days after implantation.     The skin around your stitches has increasing redness, swelling, or has drainage. This may mean that you have an infection.     You have a fever.     You have chills, a cough, and feel weak or achy. These are also signs of infection.    Your feet or ankles are more swollen than your baseline.     Your Heart rate is less than 50 beats per minute     Seek care immediately if:   Your bandage becomes soaked with blood.     Your  pacemaker is swelling rapidly    Your stitches open up.     You feel your heart suddenly beating very slowly or quickly.    You become too weak or dizzy to stand, or you pass out.     Your arm or leg feels warm, tender, and painful. It may look swollen and red.    You have chest pain that does not go away with rest or medicine.     You feel lightheaded, short of breath, and have chest pain.     You cough up blood.        © 2014 [employer] Information is for End User's use only and may not be sold, redistributed or otherwise used for commercial purposes. All illustrations and images included in CareNotes® are the copyrighted property of [employer], [employer] or [employer].  The above information is an  only. It is not intended as medical advice for individual conditions or treatments. Talk to your doctor, nurse or pharmacist before following any medical regimen to see if it is safe and effective for you.

## 2024-08-26 NOTE — ANESTHESIA PREPROCEDURE EVALUATION
Procedure:  Cardiac pacer generator change DC PM GEN CHANGE (Chest)  Cardiac pocket revision (Chest)    Relevant Problems   ANESTHESIA (within normal limits)      CARDIO   (+) Aortic stenosis   (+) Atypical chest pain   (+) HTN (hypertension)   (+) Paroxysmal atrial fibrillation (HCC)   (+) Sick sinus syndrome (HCC)   (+) Thoracic aortic aneurysm without rupture (HCC)      ENDO   (+) Type 2 diabetes mellitus (HCC)      GI/HEPATIC   (+) Dysphagia      /RENAL   (+) BPH (benign prostatic hyperplasia)   (+) CKD (chronic kidney disease) stage 2, GFR 60-89 ml/min      GYN (within normal limits)      HEMATOLOGY (within normal limits)      MUSCULOSKELETAL   (+) Sacroiliitis (HCC)      NEURO/PSYCH (within normal limits)      PULMONARY   (+) JAMES (obstructive sleep apnea)      Cardiovascular/Peripheral Vascular   (+) NSVT (nonsustained ventricular tachycardia) (HCC)      Other   (+) Obesity, morbid (HCC)        Physical Exam    Airway    Mallampati score: II         Dental    upper dentures    Cardiovascular  Cardiovascular exam normal    Pulmonary  Pulmonary exam normal     Other Findings        Anesthesia Plan  ASA Score- 3     Anesthesia Type- IV sedation with anesthesia with ASA Monitors.         Additional Monitors:     Airway Plan:            Plan Factors-    Chart reviewed. EKG reviewed. Imaging results reviewed. Existing labs reviewed. Patient summary reviewed.    Patient is not a current smoker.              Induction-     Postoperative Plan-     Perioperative Resuscitation Plan - Level 1 - Full Code.       Informed Consent- Anesthetic plan and risks discussed with patient.  I personally reviewed this patient with the CRNA. Discussed and agreed on the Anesthesia Plan with the CRNA..    Discussed IV Sedation with General Anesthesia as backup with patient including but not limited to risk of cardiac insult, pulmonary complication, stroke, reaction to medications and death. All questions answered and consent was  obtained.

## 2024-08-26 NOTE — ANESTHESIA POSTPROCEDURE EVALUATION
Post-Op Assessment Note    CV Status:  Stable  Pain Score: 0    Pain management: adequate       Mental Status:  Sleepy   Hydration Status:  Euvolemic   PONV Controlled:  Controlled   Airway Patency:  Patent     Post Op Vitals Reviewed: Yes    No anethesia notable event occurred.    Staff: Anesthesiologist, CRNA               BP   169/78   Temp   97.7   Pulse  77   Resp   16   SpO2   99

## 2024-08-26 NOTE — H&P
"H&P Exam - Electrophysiology - Cardiology   Lyndon Chairez 84 y.o. male MRN: [id number]  Unit/Bed#: [facility] RO[facility] Encounter: [id number]    Assessment & Plan     Assessment:  SSS s/p MDT DC pacer   Placed in 2016   Presented to EP clinic 8/8/24 for \"shocking\" reproducible pain around site   2 years to RRT   Decision made for generator change and pocket revision.   AP 81.9% and  28.5%   PAF   AC with Pradaxa for C2V 4  On metoprolol for rate control   HTN   On lisinopril, metoprolol, lasix   HLD   DM   JAMES  Obesity with BMI 45.34     Plan:  - plan for pocket revision and full generator change today   - plan for discharge pending coarse       History of Present Illness   HPI:  Lyndon Chairez is a 84 y.o. year old male with past medical history as mentioned above he presents to [facility] for elective generator reid e and pocket revision. Patient initially had device placed in 2016 for SSS. This has since progressed to intermittent 2 degree and 3rd degree heart gera. He does atrial pace and ventricular pace 81% and 28% respectively. He was seen in the EP office on 8/8 after complaining about a shocking pain around the device which had been ongoing for about a month. He was seen by Dr. Esquivel and recommended for pocket revision as it was felt that this would be the only way to treat this.       Review of Systems   Constitutional:  Negative for diaphoresis and fatigue.   Respiratory:  Negative for chest tightness and shortness of breath.    Cardiovascular:  Positive for chest pain (around pacer). Negative for palpitations.   Neurological:  Negative for dizziness and light-headedness.     ROS as noted above, otherwise 12 point review of systems was performed and is negative.     Historical Information   Past Medical History:   Diagnosis Date    Diabetes (HCC)     Essential tremor     Hyperlipidemia     Hypertension     Obstructive sleep apnea     Paroxysmal atrial fibrillation (HCC)     Sick sinus syndrome (HCC)      Past " Surgical History:   Procedure Laterality Date    CT NEEDLE BIOPSY LUNG  2021    CT NEEDLE BIOPSY LUNG  2021    FL GUIDED NEEDLE PLAC BX/ASP/INJ  3/11/2024     Family History: No family history on file.    Social History   Social History     Substance and Sexual Activity   Alcohol Use Not Currently     Social History     Substance and Sexual Activity   Drug Use Never     Social History     Tobacco Use   Smoking Status Former    Current packs/day: 0.00    Average packs/day: 2.0 packs/day for 7.0 years (14.0 ttl pk-yrs)    Types: Cigarettes    Start date:     Quit date:     Years since quittin.6   Smokeless Tobacco Never       Meds/Allergies   all medications and allergies reviewed  Home Medications:   Medications Prior to Admission:     acetaminophen (TYLENOL) 500 mg tablet    albuterol (PROVENTIL HFA,VENTOLIN HFA) 90 mcg/act inhaler    allopurinol (ZYLOPRIM) 100 mg tablet    Ascorbic Acid (VITAMIN C PO)    azelastine (ASTELIN) 0.1 % nasal spray    bisacodyl (DULCOLAX) 5 mg EC tablet    CHOLECALCIFEROL PO    colchicine (COLCRYS) 0.6 mg tablet    cyanocobalamin (VITAMIN B-12) 1000 MCG tablet    dabigatran etexilate (PRADAXA) 150 mg capsu    ezetimibe (ZETIA) 10 mg tablet    FERROUS FUMARATE PO    furosemide (LASIX) 20 mg tablet    gabapentin (Neurontin) 300 mg capsule    insulin degludec (TRESIBA) 100 units/mL injection pen    ipratropium (ATROVENT) 0.03 % nasal spray    levothyroxine 112 mcg tablet    lisinopril (ZESTRIL) 5 mg tablet    loratadine (CLARITIN) 10 mg tablet    metoprolol succinate (TOPROL-XL) 50 mg 24 hr tablet    pantoprazole (PROTONIX) 40 mg tablet    polyethylene glycol (MIRALAX) 17 g packet    tamsulosin (FLOMAX) 0.4 mg    Allergies   Allergen Reactions    Aspirin GI Bleeding and Other (See Comments)    Sitagliptin Hives and Rash    Canagliflozin Rash and Hives     Burning with urination and kidneys started to fail       Objective   Vitals: There were no vitals taken for this  visit.      No intake or output data in the 24 hours ending 08/26/24 0646    Invasive Devices       None                   Physical Exam   GEN: NAD, alert and oriented, well appearing  SKIN: dry without significant lesions or rashes  HEENT: NCAT, PERRL, EOMs intact  NECK: No JVD or carotid bruits appreciated  CARDIOVASCULAR: RRR, normal S1, S2 without murmurs, rubs, or gallops appreciated  LUNGS: Clear to auscultation bilaterally without wheezes, rhonchi, or rales  ABDOMEN: Soft, nontender, nondistended  EXTREMITIES/VASCULAR: perfused without clubbing, cyanosis, or edema b/l  PSYCH: Normal mood and affect  NEURO: CN ll-Xll grossly intact    Lab Results: I have personally reviewed pertinent lab results.    Results from last 7 days   Lab Units 08/23/24  1241 08/19/24  0936   WBC Thousand/uL 9.04 10.35*   HEMOGLOBIN g/dL 14.6 14.5   HEMATOCRIT % 46.3 46.4   PLATELETS Thousands/uL 246 254     Results from last 7 days   Lab Units 08/23/24  1241 08/19/24  0936   POTASSIUM mmol/L 5.0 4.4   CHLORIDE mmol/L 104 104   CO2 mmol/L 27 24   BUN mg/dL 21 24   CREATININE mg/dL 1.24 1.34*   CALCIUM mg/dL 9.3 9.8                 Imaging: I have personally reviewed pertinent reports.    No results found for this or any previous visit.      Code Status: Level 1 - Full Code    ** Please Note: Dictation voice to text software may have been used in the creation of this document. **     07-Oct-2022 10:14

## 2024-09-06 ENCOUNTER — TELEPHONE (OUTPATIENT)
Dept: OBGYN CLINIC | Facility: CLINIC | Age: 84
End: 2024-09-06

## 2024-09-06 NOTE — TELEPHONE ENCOUNTER
Ramez for leoncio strickland at [facility] to reschedule this patient's appt with dr. Osorio.

## 2024-09-09 ENCOUNTER — IN-CLINIC DEVICE VISIT (OUTPATIENT)
Dept: CARDIOLOGY CLINIC | Facility: CLINIC | Age: 84
End: 2024-09-09

## 2024-09-09 DIAGNOSIS — Z95.0 PRESENCE OF PERMANENT CARDIAC PACEMAKER: Primary | ICD-10-CM

## 2024-09-09 PROCEDURE — 99024 POSTOP FOLLOW-UP VISIT: CPT | Performed by: STUDENT IN AN ORGANIZED HEALTH CARE EDUCATION/TRAINING PROGRAM

## 2024-09-09 NOTE — PROGRESS NOTES
Results for orders placed or performed in visit on 09/09/24   Cardiac EP device report    Narrative    MDT DC PPM (MVP ON) - ACTIVE SYSTEM IS MRI CONDITIONAL  DEVICE INTERROGATED IN THE [place] OFFICE. BATTERY VOLTAGE ADEQUATE. (13 YRS) AP 55%  70%. ALL LEAD PARAMETERS WITHIN NORMAL LIMITS. NO SIGNIFICANT HIGH RATE EPISODES. NO PROGRAMMING CHANGES MADE TO DEVICE PARAMETERS. NORMAL DEVICE FUNCTION. WOUND CHECK: INCISION CLEAN AND DRY WITH EDGES APPROXIMATED; WOUND CARE AND RESTRICTIONS REVIEWED WITH PATIENT.--FLANNERY

## 2024-10-03 ENCOUNTER — OFFICE VISIT (OUTPATIENT)
Dept: OBGYN CLINIC | Facility: CLINIC | Age: 84
End: 2024-10-03
Payer: MEDICARE

## 2024-10-03 VITALS
WEIGHT: 285 LBS | SYSTOLIC BLOOD PRESSURE: 127 MMHG | BODY MASS INDEX: 39.9 KG/M2 | DIASTOLIC BLOOD PRESSURE: 76 MMHG | HEART RATE: 65 BPM | HEIGHT: 71 IN

## 2024-10-03 DIAGNOSIS — Z96.659 FAILURE OF TOTAL KNEE REPLACEMENT, INITIAL ENCOUNTER  (HCC): Primary | ICD-10-CM

## 2024-10-03 DIAGNOSIS — T14.8XXA JOINT INJURY: ICD-10-CM

## 2024-10-03 DIAGNOSIS — T84.018A FAILURE OF TOTAL KNEE REPLACEMENT, INITIAL ENCOUNTER  (HCC): Primary | ICD-10-CM

## 2024-10-03 PROCEDURE — 99214 OFFICE O/P EST MOD 30 MIN: CPT | Performed by: STUDENT IN AN ORGANIZED HEALTH CARE EDUCATION/TRAINING PROGRAM

## 2024-10-03 NOTE — PROGRESS NOTES
Date: 10/03/24  Lyndon Chairez   MRN# [id number]  : 1940      Chief Complaint: Right Knee Pain    Assessment and Plan:  The patient verbalized understanding of exam findings and treatment plan. We engaged in the shared decision-making process and treatment options were discussed at length with the patient. Surgical and conservative management discussed today along with risks and benefits. Patient was agreeable with the plan and all questions were answered to satisfaction.     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     -Patient has had significant weight loss since his last visit and has achieved a BMI of 40 which was required prior to undergoing revision knee arthroplasty.  -Patient states that his hemoglobin A1c is much improved as per his assisted living facility, but we have no documentation of this A1c.  Orders been placed for preoperative labs including a hemoglobin A1c  -While I continue to believe that the patient's knee would likely benefit from revision knee arthroplasty, we must proceed with caution as he is medically friable.  As such, I recommended evaluation by Dr. Vishal Herron and Ирина Rios to assess his fitness for possible revision surgery.  -Once the patient has received confirmation that he could safely undergo revision knee arthroplasty from a medical standpoint, I would like to see the patient back in clinic to discuss further surgical options.  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  Patient states that he continues to have daily dysfunction about the right knee since his last visit.  He does walk with the assistance of a walker at this time.  He has tried losing weight which is required for him to undergo revision surgery.  He also states that he has made significant prevents in his hemoglobin A1c.  His knee continues to hurt despite anti-inflammatories and Tylenol as well as activity modification.  Patient also complains of radicular pain going down the right lower extremity which is worse with prolonged ambulation.  Patient would like to pursue revision knee surgery if medically cleared.    External Records Reviewed: Clinic notes, radiographic reports and laboratory analysis    Prior treatment:  NSAIDs Yes    Bracing No   Physical Therapy Yes   Cortisone Injections No   Viscosupplementation No     Allergy:  Allergies   Allergen Reactions    Aspirin GI Bleeding and Other (See Comments)    Sitagliptin Hives and Rash    Canagliflozin Rash and Hives     Burning with urination and kidneys started to fail     Medications:  All current active meds have been reviewed   Past Medical History:  Past Medical History:   Diagnosis Date    Diabetes (HCC)     Essential tremor     Hyperlipidemia     Hypertension     Obstructive sleep apnea     Paroxysmal atrial fibrillation (HCC)     Sick sinus syndrome (HCC)      Past Surgical History:  Past Surgical History:   Procedure Laterality Date    CARDIAC ELECTROPHYSIOLOGY PROCEDURE N/A 8/26/2024    Procedure: Cardiac pacer generator change DC PM GEN CHANGE;  Surgeon: Wilton Esquivel DO;  [facility];  Service: Cardiology    CARDIAC ELECTROPHYSIOLOGY PROCEDURE N/A 8/26/2024    Procedure: Cardiac pocket revision;  Surgeon: Wilton Esquivel DO;  Location: [facility];  Service: Cardiology    CT NEEDLE BIOPSY LUNG  2/4/2021    CT NEEDLE BIOPSY LUNG  2/4/2021    FL GUIDED NEEDLE PLAC  "BX/ASP/INJ  3/11/2024     Family History:  History reviewed. No pertinent family history.  Social History:  Social History     Substance and Sexual Activity   Alcohol Use Not Currently     Social History     Substance and Sexual Activity   Drug Use Never     Social History     Tobacco Use   Smoking Status Former    Current packs/day: 0.00    Average packs/day: 2.0 packs/day for 7.0 years (14.0 ttl pk-yrs)    Types: Cigarettes    Start date:     Quit date:     Years since quittin.7   Smokeless Tobacco Never           Review of Systems:  General- denies fever/chills  HEENT- denies hearing loss or sore throat  Eyes- denies eye pain or visual disturbances, denies red eyes  Respiratory- denies cough or SOB  Cardio- denies chest pain or palpitations  GI- denies abdominal pain  Endocrine- denies urinary frequency  Urinary- denies pain with urination  Musculoskeletal- Negative except noted above  Skin- denies rashes or wounds  Neurological- denies dizziness or headache  Psychiatric- denies anxiety or difficulty concentrating      Objective:   BP Readings from Last 1 Encounters:   10/03/24 127/76      Wt Readings from Last 1 Encounters:   10/03/24 129 kg (285 lb)      Pulse Readings from Last 1 Encounters:   10/03/24 65        BMI: Estimated body mass index is 40.32 kg/m² as calculated from the following:    Height as of this encounter: 5' 10.5\" (1.791 m).    Weight as of this encounter: 129 kg (285 lb).      Physical Exam  /76   Pulse 65   Ht 5' 10.5\" (1.791 m)   Wt 129 kg (285 lb)   BMI 40.32 kg/m²   General/Constitutional: No apparent distress: well-nourished and well developed.  Eyes: normal ocular motion  Cardio: RRR, Normal S1S2, No m/r/g.   Lymphatic: No appreciable lymphadenopathy  Respiratory: Non-labored breathing, CTA b/l no w/c/r  Vascular: No edema, swelling or tenderness, except as noted in detailed exam. Extremities well perfused.  1+ LE edema  Integumentary: No impressive skin lesions " present, except as noted in detailed exam.  Neuro: No ataxia or tremors noted  Psych: Normal mood and affect, oriented to person, place and time. Appropriate affect.  Musculoskeletal: Normal, except as noted in detailed exam and in HPI.    Gait and Station:   antalgic and walker assisted    Right Knee:      Inspection: Well-healed anterior knee incision.  Brawny edema appreciated to the lower extremity    Overall limb alignment: neutral    Effusion: minimal    ROM 0 to 120 with pain    Extensor Lag: Absent    Palpation: Medial and Lateral joint line tenderness to palpation    unstable to AP translation at 90 deg    Coronal plane stable in full extension    Coronal plane equivocal in mid-flexion secondary to guarding    Motor: 5/5 EHL/FHL/TA/GS/Qd/Hs    Vascular: Toes WWP with BCR    Sensory: SILT DP/SP/Vasiliy/Saph/Ti        Images:  I personally reviewed relevant images in the PACS system and my interpretation is as follows:    X-rays of the right knee: presence of a right total knee arthroplasty in appropriate position with no evidence of loosening or subsidence        Hector Osorio MD  Adult Reconstruction Specialist   [facility]

## 2024-10-03 NOTE — ASSESSMENT & PLAN NOTE
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     -Patient has had significant weight loss since his last visit and has achieved a BMI of 40 which was required prior to undergoing revision knee arthroplasty.  -Patient states that his hemoglobin A1c is much improved as per his assisted living facility, but we have no documentation of this A1c.  Orders been placed for preoperative labs including a hemoglobin A1c  -While I continue to believe that the patient's knee would likely benefit from revision knee arthroplasty, we must proceed with caution as he is medically friable.  As such, I recommended evaluation by Dr. Vishal Herron and Ирина Rios to assess his fitness for possible revision surgery.  -Once the patient has received confirmation that he could safely undergo revision knee arthroplasty from a medical standpoint, I would like to see the patient back in clinic to discuss further surgical options.  -Referral made to spine and pain for lumbar DDD, stenosis and radiculopathy  -WBAT RLE  -Continue Tylenol 1000mg TID

## 2024-10-25 ENCOUNTER — OFFICE VISIT (OUTPATIENT)
Dept: PAIN MEDICINE | Facility: CLINIC | Age: 84
End: 2024-10-25
Payer: MEDICARE

## 2024-10-25 VITALS — WEIGHT: 285 LBS | HEIGHT: 71 IN | BODY MASS INDEX: 39.9 KG/M2

## 2024-10-25 DIAGNOSIS — M47.816 LUMBAR SPONDYLOSIS: ICD-10-CM

## 2024-10-25 DIAGNOSIS — Z98.890 HISTORY OF LUMBAR LAMINECTOMY: ICD-10-CM

## 2024-10-25 DIAGNOSIS — M46.1 SACROILIITIS, NOT ELSEWHERE CLASSIFIED (HCC): Primary | ICD-10-CM

## 2024-10-25 PROCEDURE — 99214 OFFICE O/P EST MOD 30 MIN: CPT | Performed by: ANESTHESIOLOGY

## 2024-10-25 PROCEDURE — G2211 COMPLEX E/M VISIT ADD ON: HCPCS | Performed by: ANESTHESIOLOGY

## 2024-10-25 RX ORDER — OXYCODONE HYDROCHLORIDE 15 MG/1
15 TABLET ORAL EVERY 4 HOURS PRN
COMMUNITY

## 2024-10-25 NOTE — PROGRESS NOTES
Assessment:  1. Sacroiliitis, not elsewhere classified (HCC)    2. History of lumbar laminectomy    3. Lumbar spondylosis       Patient presenting for follow up visit. He has a history of chronic right > left sided back/hip pain radiating to the buttock, lateral hip and posterior thigh for >1 year with waxing and waning significant pain episodes.     Pain is consistent with sacroiliac joint dysfunction, greater trochanteric bursitis and accompanied by pain at times >7/10 on the pain scale with inability to participate in IADLs for >6 weeks. Patient has fully participated with physical therapy in the past with no benefit.  Has been taking oxycodone, Tylenol with modest benefit.  Denies any bowel or bladder incontinence, saddle anesthesia.     Reviewed and interpreted lumbar and hip XRs. This shows multilevel extensive facet arthropathy in the lumbar spine. Hip/pelvis x-rays show stable right total hip arthroplasty.     Patient reports a history of bilateral laminectomies at L3-4 and L4-5 in 2016.  Subsequently he did have significant benefit with right L3 and L4 transforaminal LEANNE's at [facility] around 2020.      After R SIJ injection on 10/5/23, he noticed significant improvement of his lower lumbar pain but continues to have more lateral pain. He did have significant tenderness overlying the greater trochanter.     After right greater troch bursa injection on 11/30/23, he has noticed almost complete resolution of his residual lateral hip symptoms.     Patient was made aware he can follow up anytime for repeat SIJ or hip bursa injections.     Today is following up with worsening bilateral R>L LBP symptoms.    Plan:    Discussed and offered bilateral SIJ injections.  Patient does fulfill the criteria for sacroiliac joint injection at this time with the following:  -Moderate to severe low back pain over the anatomical location of the bilateral sacroiliac joint below L5,  -Pain duration of at least 3 months,  -No untreated  radiculopathy,  -3 positive provocative maneuvers noted on exam: Evette, compression, distraction test  -Inadequate response to conservative methods for 4 weeks    The SI joint injection will be performed under fluoroscopic guidance, and subsequent therapeutic sacroiliac joint injections will be done if the diagnostic injections provide at least 75% sustained relief for the duration of the local anesthetic or anti-inflammatory .     He is endorsing worsening right knee pain. He has a prior total knee replacement. Unfortunately genicular nerve blocks are now deemed experimental is not something I can offer.     Reviewed pertinent laboratory studies, specifically renal function, hemoglobin A1c, CBC, coagulation studies, prior to recommending medication therapies/interventional treatment options.     My impressions and treatment recommendations were discussed in detail with the patient who verbalized understanding and had no further questions.  Discharge instructions were provided. I personally saw and examined the patient and I agree with the above discussed plan of care.    Orders Placed This Encounter   Procedures    FL spine and pain procedure     Standing Status:   Future     Standing Expiration Date:   10/25/2028     Order Specific Question:   Reason for Exam:     Answer:   bilateral SIJ injections     Order Specific Question:   Anticoagulant hold needed?     Answer:   no     New Medications Ordered This Visit   Medications    oxyCODONE (ROXICODONE) 15 mg immediate release tablet     Sig: Take 15 mg by mouth every 4 (four) hours as needed for moderate pain       History of Present Illness:  Lyndon Chairez is a 84 y.o. male who presents for a follow up office visit in regards to Back Pain.   The patient’s current symptoms include continued back, hip and knee pain symptoms. Pain is rated 8/10 and described as a constant throbbing pain in these areas.    I have personally reviewed and/or updated the patient's past  medical history, past surgical history, family history, social history, current medications, allergies, and vital signs today.     Review of Systems   Constitutional:  Negative for chills and fever.   HENT:  Negative for ear pain and sore throat.    Eyes:  Negative for pain and visual disturbance.   Respiratory:  Negative for cough and shortness of breath.    Cardiovascular:  Negative for chest pain and palpitations.   Gastrointestinal:  Negative for abdominal pain and vomiting.   Genitourinary:  Negative for dysuria and hematuria.   Musculoskeletal:  Positive for arthralgias, back pain, gait problem and myalgias.   Skin:  Negative for color change and rash.   Neurological:  Positive for weakness. Negative for seizures and syncope.   All other systems reviewed and are negative.      Patient Active Problem List   Diagnosis    Obesity, morbid (HCC)    Atypical chest pain    Dysphagia    Sick sinus syndrome (HCC)    Paroxysmal atrial fibrillation (HCC)    BPH (benign prostatic hyperplasia)    Type 2 diabetes mellitus (HCC)    COVID-19    Lung nodules    Excessive daytime sleepiness    Sacroiliitis (HCC)    Greater trochanteric bursitis of right hip    Aortic stenosis    HTN (hypertension)    NSVT (nonsustained ventricular tachycardia) (HCC)    Dyslipidemia    Coronary artery calcification    JAMES (obstructive sleep apnea)    Thoracic aortic aneurysm without rupture (HCC)    CKD (chronic kidney disease) stage 2, GFR 60-89 ml/min    Failed total knee arthroplasty  (HCC)       Past Medical History:   Diagnosis Date    Diabetes (HCC)     Essential tremor     Hyperlipidemia     Hypertension     Obstructive sleep apnea     Paroxysmal atrial fibrillation (HCC)     Sick sinus syndrome (HCC)        Past Surgical History:   Procedure Laterality Date    CARDIAC ELECTROPHYSIOLOGY PROCEDURE N/A 8/26/2024    Procedure: Cardiac pacer generator change DC PM GEN CHANGE;  Surgeon: Wilton Esquivel DO;  [facility];   Service: Cardiology    CARDIAC ELECTROPHYSIOLOGY PROCEDURE N/A 2024    Procedure: Cardiac pocket revision;  Surgeon: Wilton Esquivel DO;  [facility];  Service: Cardiology    CT NEEDLE BIOPSY LUNG  2021    CT NEEDLE BIOPSY LUNG  2021    FL GUIDED NEEDLE PLAC BX/ASP/INJ  3/11/2024       History reviewed. No pertinent family history.    Social History     Occupational History    Not on file   Tobacco Use    Smoking status: Former     Current packs/day: 0.00     Average packs/day: 2.0 packs/day for 7.0 years (14.0 ttl pk-yrs)     Types: Cigarettes     Start date:      Quit date:      Years since quittin.8    Smokeless tobacco: Never   Vaping Use    Vaping status: Never Used   Substance and Sexual Activity    Alcohol use: Not Currently    Drug use: Never    Sexual activity: Not on file       Current Outpatient Medications on File Prior to Visit   Medication Sig    acetaminophen (TYLENOL) 500 mg tablet Take 500 mg by mouth every 6 (six) hours as needed    albuterol (PROVENTIL HFA,VENTOLIN HFA) 90 mcg/act inhaler Inhale 2 puffs every 6 (six) hours as needed    allopurinol (ZYLOPRIM) 100 mg tablet Take 100 mg by mouth daily    Ascorbic Acid (VITAMIN C PO) Take 2 tablets by mouth every morning    bisacodyl (DULCOLAX) 5 mg EC tablet Take 1 tablet (5 mg total) by mouth daily as needed for constipation    CHOLECALCIFEROL PO Take 2 tablets by mouth every morning    colchicine (COLCRYS) 0.6 mg tablet Take 0.6 mg by mouth 2 (two) times a day    cyanocobalamin (VITAMIN B-12) 1000 MCG tablet Take 1,000 mcg by mouth    dabigatran etexilate (PRADAXA) 150 mg capsu Take 1 capsule (150 mg total) by mouth every 12 (twelve) hours    FERROUS FUMARATE PO Take 1 tablet by mouth every morning    furosemide (LASIX) 20 mg tablet Take 20 mg by mouth daily    gabapentin (Neurontin) 300 mg capsule Take 1 capsule (300 mg total) by mouth 3 (three) times a day    insulin degludec (TRESIBA) 100 units/mL  "injection pen Inject 80 Units under the skin    levothyroxine 112 mcg tablet     lisinopril (ZESTRIL) 5 mg tablet     loratadine (CLARITIN) 10 mg tablet Take 10 mg by mouth daily    metoprolol succinate (TOPROL-XL) 50 mg 24 hr tablet Take 1 tablet (50 mg total) by mouth daily Do not start before October 31, 2022.    oxyCODONE (ROXICODONE) 15 mg immediate release tablet Take 15 mg by mouth every 4 (four) hours as needed for moderate pain    pantoprazole (PROTONIX) 40 mg tablet Take 1 tablet (40 mg total) by mouth daily    polyethylene glycol (MIRALAX) 17 g packet Take 17 g by mouth daily    tamsulosin (FLOMAX) 0.4 mg Take 1 capsule (0.4 mg total) by mouth daily with dinner    azelastine (ASTELIN) 0.1 % nasal spray 1 spray into each nostril 2 (two) times a day Use in each nostril as directed    ezetimibe (ZETIA) 10 mg tablet Take 10 mg by mouth daily    ipratropium (ATROVENT) 0.03 % nasal spray 2 sprays into each nostril every 12 (twelve) hours     No current facility-administered medications on file prior to visit.       Allergies   Allergen Reactions    Aspirin GI Bleeding and Other (See Comments)    Sitagliptin Hives and Rash    Canagliflozin Rash and Hives     Burning with urination and kidneys started to fail       Physical Exam:    Ht 5' 10.5\" (1.791 m)   Wt 129 kg (285 lb)   BMI 40.32 kg/m²     Constitutional:normal, well developed, well nourished, alert, in no distress and non-toxic and no overt pain behavior.  Eyes:anicteric  HEENT:grossly intact  Neck:supple, symmetric, trachea midline and no masses   Pulmonary:even and unlabored  Cardiovascular:No edema or pitting edema present  Skin:Normal without rashes or lesions and well hydrated  Psychiatric:Mood and affect appropriate  Neurologic: Motor function is grossly intact with no focal neurologic deficits   Musculoskeletal: Posterior pelvic pain provocation. +Bilateral Everardo's test. +Bilateral SI compression tests. +Bilateral SI distraction " tests.    Imaging

## 2024-11-11 ENCOUNTER — HOSPITAL ENCOUNTER (OUTPATIENT)
Dept: RADIOLOGY | Facility: MEDICAL CENTER | Age: 84
Discharge: HOME | End: 2024-11-11
Payer: MEDICARE

## 2024-11-11 VITALS
SYSTOLIC BLOOD PRESSURE: 136 MMHG | DIASTOLIC BLOOD PRESSURE: 73 MMHG | RESPIRATION RATE: 20 BRPM | HEART RATE: 80 BPM | OXYGEN SATURATION: 97 % | TEMPERATURE: 98 F

## 2024-11-11 DIAGNOSIS — M46.1 SACROILIITIS, NOT ELSEWHERE CLASSIFIED (HCC): ICD-10-CM

## 2024-11-11 PROCEDURE — 27096 INJECT SACROILIAC JOINT: CPT | Performed by: ANESTHESIOLOGY

## 2024-11-11 RX ORDER — METHYLPREDNISOLONE ACETATE 40 MG/ML
80 INJECTION, SUSPENSION INTRA-ARTICULAR; INTRALESIONAL; INTRAMUSCULAR; PARENTERAL; SOFT TISSUE ONCE
Status: COMPLETED | OUTPATIENT
Start: 2024-11-11 | End: 2024-11-11

## 2024-11-11 RX ORDER — LIDOCAINE HYDROCHLORIDE 10 MG/ML
5 INJECTION, SOLUTION EPIDURAL; INFILTRATION; INTRACAUDAL; PERINEURAL ONCE
Status: COMPLETED | OUTPATIENT
Start: 2024-11-11 | End: 2024-11-11

## 2024-11-11 RX ORDER — ROPIVACAINE HYDROCHLORIDE 2 MG/ML
6 INJECTION, SOLUTION EPIDURAL; INFILTRATION; PERINEURAL ONCE
Status: COMPLETED | OUTPATIENT
Start: 2024-11-11 | End: 2024-11-11

## 2024-11-11 RX ADMIN — IOHEXOL 1 ML: 300 INJECTION, SOLUTION INTRAVENOUS at 09:43

## 2024-11-11 RX ADMIN — LIDOCAINE HYDROCHLORIDE 5 ML: 10 INJECTION, SOLUTION EPIDURAL; INFILTRATION; INTRACAUDAL; PERINEURAL at 09:42

## 2024-11-11 RX ADMIN — METHYLPREDNISOLONE ACETATE 80 MG: 40 INJECTION, SUSPENSION INTRA-ARTICULAR; INTRALESIONAL; INTRAMUSCULAR; SOFT TISSUE at 09:43

## 2024-11-11 RX ADMIN — ROPIVACAINE HYDROCHLORIDE 6 ML: 2 INJECTION, SOLUTION EPIDURAL; INFILTRATION; PERINEURAL at 09:43

## 2024-11-11 NOTE — DISCHARGE INSTRUCTIONS

## 2024-11-11 NOTE — PROGRESS NOTES
Per pt he took his insulin this AM at 0600 and his BS was 125 and per pt he did not eat.  Pt given apple juice at present, no c/o dizziness or shakiness wears a monitor but did not bring reader with him.

## 2024-11-11 NOTE — H&P
History of Present Illness: The patient is a 84 y.o. male who presents with complaints of back pain    Past Medical History:   Diagnosis Date    Diabetes (HCC)     Essential tremor     Hyperlipidemia     Hypertension     Obstructive sleep apnea     Paroxysmal atrial fibrillation (HCC)     Sick sinus syndrome (HCC)        Past Surgical History:   Procedure Laterality Date    CARDIAC ELECTROPHYSIOLOGY PROCEDURE N/A 8/26/2024    Procedure: Cardiac pacer generator change DC PM GEN CHANGE;  Surgeon: Wilton Esquivel DO;  [facility];  Service: Cardiology    CARDIAC ELECTROPHYSIOLOGY PROCEDURE N/A 8/26/2024    Procedure: Cardiac pocket revision;  Surgeon: Wilton Esquivel DO;  [facility];  Service: Cardiology    CT NEEDLE BIOPSY LUNG  2/4/2021    CT NEEDLE BIOPSY LUNG  2/4/2021    FL GUIDED NEEDLE PLAC BX/ASP/INJ  3/11/2024         Current Outpatient Medications:     acetaminophen (TYLENOL) 500 mg tablet, Take 500 mg by mouth every 6 (six) hours as needed, Disp: , Rfl:     albuterol (PROVENTIL HFA,VENTOLIN HFA) 90 mcg/act inhaler, Inhale 2 puffs every 6 (six) hours as needed, Disp: , Rfl:     allopurinol (ZYLOPRIM) 100 mg tablet, Take 100 mg by mouth daily, Disp: , Rfl:     Ascorbic Acid (VITAMIN C PO), Take 2 tablets by mouth every morning, Disp: , Rfl:     azelastine (ASTELIN) 0.1 % nasal spray, 1 spray into each nostril 2 (two) times a day Use in each nostril as directed, Disp: 5 mL, Rfl: 3    bisacodyl (DULCOLAX) 5 mg EC tablet, Take 1 tablet (5 mg total) by mouth daily as needed for constipation, Disp: 60 tablet, Rfl: 5    CHOLECALCIFEROL PO, Take 2 tablets by mouth every morning, Disp: , Rfl:     colchicine (COLCRYS) 0.6 mg tablet, Take 0.6 mg by mouth 2 (two) times a day, Disp: , Rfl:     cyanocobalamin (VITAMIN B-12) 1000 MCG tablet, Take 1,000 mcg by mouth, Disp: , Rfl:     dabigatran etexilate (PRADAXA) 150 mg capsu, Take 1 capsule (150 mg total) by mouth every 12 (twelve) hours,  Disp: , Rfl: 0    ezetimibe (ZETIA) 10 mg tablet, Take 10 mg by mouth daily, Disp: , Rfl:     FERROUS FUMARATE PO, Take 1 tablet by mouth every morning, Disp: , Rfl:     furosemide (LASIX) 20 mg tablet, Take 20 mg by mouth daily, Disp: , Rfl:     gabapentin (Neurontin) 300 mg capsule, Take 1 capsule (300 mg total) by mouth 3 (three) times a day, Disp: 90 capsule, Rfl: 0    insulin degludec (TRESIBA) 100 units/mL injection pen, Inject 80 Units under the skin, Disp: , Rfl:     ipratropium (ATROVENT) 0.03 % nasal spray, 2 sprays into each nostril every 12 (twelve) hours, Disp: 10 mL, Rfl: 1    levothyroxine 112 mcg tablet, , Disp: , Rfl:     lisinopril (ZESTRIL) 5 mg tablet, , Disp: , Rfl:     loratadine (CLARITIN) 10 mg tablet, Take 10 mg by mouth daily, Disp: , Rfl:     metoprolol succinate (TOPROL-XL) 50 mg 24 hr tablet, Take 1 tablet (50 mg total) by mouth daily Do not start before October 31, 2022., Disp: , Rfl: 0    oxyCODONE (ROXICODONE) 15 mg immediate release tablet, Take 15 mg by mouth every 4 (four) hours as needed for moderate pain, Disp: , Rfl:     pantoprazole (PROTONIX) 40 mg tablet, Take 1 tablet (40 mg total) by mouth daily, Disp: 30 tablet, Rfl: 2    polyethylene glycol (MIRALAX) 17 g packet, Take 17 g by mouth daily, Disp: 1700 g, Rfl: 3    tamsulosin (FLOMAX) 0.4 mg, Take 1 capsule (0.4 mg total) by mouth daily with dinner, Disp: , Rfl: 0    Current Facility-Administered Medications:     iohexol (OMNIPAQUE) 300 mg/mL injection 1 mL, 1 mL, Intra-articular, Once, Will Heidi Slater MD    lidocaine (PF) (XYLOCAINE-MPF) 1 % injection 5 mL, 5 mL, Infiltration, Once, Will Heidi Slater MD    methylPREDNISolone acetate (DEPO-MEDROL) injection 80 mg, 80 mg, Intra-articular, Once, Will Heidi Slater MD    ropivacaine (NAROPIN) injection 6 mL, 6 mL, Intra-articular, Once, Will Heidi Slater MD    Allergies   Allergen Reactions    Aspirin GI Bleeding and Other (See Comments)    Sitagliptin Hives and Rash     Canagliflozin Rash and Hives     Burning with urination and kidneys started to fail       Physical Exam:   Vitals:    11/11/24 0932   BP: 136/79   Pulse: 87   Resp: 20   Temp: 98 °F (36.7 °C)   SpO2: 97%     General: Awake, Alert, Oriented x 3, Mood and affect appropriate  Respiratory: Respirations even and unlabored  Cardiovascular: Peripheral pulses intact; no edema  Musculoskeletal Exam: back pain    ASA Score: 3    Patient/Chart Verification  Patient ID Verified: Verbal  ID Band Applied: No  Consents Confirmed: Procedural, To be obtained in the Pre-Procedure area  H&P( within 30 days) Verified: To be obtained in the Procedural area  Interval H&P(within 24 hr) Complete (required for Outpatients and Surgery Admit only): To be obtained in the Procedural area  Allergies Reviewed: No  Anticoag/NSAID held?: NA  Currently on antibiotics?: No    Assessment:   1. Sacroiliitis, not elsewhere classified (HCC)        Plan: bilateral SIJ injections

## 2024-11-25 ENCOUNTER — TELEPHONE (OUTPATIENT)
Dept: PAIN MEDICINE | Facility: CLINIC | Age: 84
End: 2024-11-25

## 2024-11-25 NOTE — TELEPHONE ENCOUNTER
1st attempt  Unable to lm to cb with % improvement and pain level.   Call disconnected while being transferred

## 2024-12-11 ENCOUNTER — REMOTE DEVICE CLINIC VISIT (OUTPATIENT)
Dept: CARDIOLOGY CLINIC | Facility: CLINIC | Age: 84
End: 2024-12-11
Payer: MEDICARE

## 2024-12-11 DIAGNOSIS — Z95.0 CARDIAC PACEMAKER IN SITU: Primary | ICD-10-CM

## 2024-12-11 PROCEDURE — 93296 REM INTERROG EVL PM/IDS: CPT | Performed by: INTERNAL MEDICINE

## 2024-12-11 PROCEDURE — 93294 REM INTERROG EVL PM/LDLS PM: CPT | Performed by: INTERNAL MEDICINE

## 2024-12-11 NOTE — PROGRESS NOTES
Results for orders placed or performed in visit on 12/11/24   Cardiac EP device report    Narrative    MDT DC PPM (MVP ON) - ACTIVE SYSTEM IS MRI CONDITIONAL  CARELINK TRANSMISSION: BATTERY VOLTAGE ADEQUATE (13.5 YRS). AP-63%, -28%. ALL AVAILABLE LEAD PARAMETERS WITHIN NORMAL LIMITS. NO SIGNIFICANT HIGH RATE EPISODES. NORMAL DEVICE FUNCTION. GV

## 2024-12-24 ENCOUNTER — RESULTS FOLLOW-UP (OUTPATIENT)
Dept: CARDIOLOGY CLINIC | Facility: CLINIC | Age: 84
End: 2024-12-24

## 2025-01-15 ENCOUNTER — TELEPHONE (OUTPATIENT)
Age: 85
End: 2025-01-15

## 2025-01-15 NOTE — TELEPHONE ENCOUNTER
[facility] called to schedule patient for impacted cerumen; scheduled next available and advised we would reach out with sooner appointment; please call [phone] [phone]; ASK FOR LUISA with updated appointment details.

## 2025-01-21 ENCOUNTER — TELEPHONE (OUTPATIENT)
Age: 85
End: 2025-01-21

## 2025-01-21 NOTE — TELEPHONE ENCOUNTER
Thuan from [facility] called to confirmed if patient had 2 appointments. Advised Thuan patient has appointment 01/23/2025 at 1:00 pm.

## 2025-01-22 ENCOUNTER — OFFICE VISIT (OUTPATIENT)
Dept: PAIN MEDICINE | Facility: CLINIC | Age: 85
End: 2025-01-22
Payer: MEDICARE

## 2025-01-22 VITALS — WEIGHT: 285 LBS | HEIGHT: 71 IN | BODY MASS INDEX: 39.9 KG/M2

## 2025-01-22 DIAGNOSIS — M54.16 LUMBAR RADICULOPATHY: Primary | ICD-10-CM

## 2025-01-22 DIAGNOSIS — M70.61 GREATER TROCHANTERIC BURSITIS OF RIGHT HIP: ICD-10-CM

## 2025-01-22 PROCEDURE — G2211 COMPLEX E/M VISIT ADD ON: HCPCS | Performed by: ANESTHESIOLOGY

## 2025-01-22 PROCEDURE — 99214 OFFICE O/P EST MOD 30 MIN: CPT | Performed by: ANESTHESIOLOGY

## 2025-01-22 NOTE — PROGRESS NOTES
Assessment:  1. Lumbar radiculopathy    2. Greater trochanteric bursitis of right hip      Patient presenting for follow up visit. He has a history of chronic right > left sided back/hip pain radiating to the buttock, lateral hip and posterior thigh for >1 year with waxing and waning significant pain episodes.     Pain is multifactorial with components of lumbar radiculopathy, sacroiliac joint dysfunction, greater trochanteric bursitis and accompanied by pain at times >7/10 on the pain scale with inability to participate in IADLs for >6 weeks. Patient has fully participated with physical therapy in the past with no benefit.  Has been taking oxycodone, Tylenol with minimal benefit.  Denies any bowel or bladder incontinence, saddle anesthesia.     Reviewed and interpreted lumbar and hip XRs. This shows multilevel extensive facet arthropathy in the lumbar spine. Hip/pelvis x-rays show stable right total hip arthroplasty.     Patient reports a history of bilateral laminectomies at L3-4 and L4-5 in 2016.  Subsequently he did have significant benefit with right L3 and L4 transforaminal LEANNE's at [facility] around 2020.      After R SIJ injection on 10/5/23, he noticed significant improvement of his lower lumbar pain but continues to have more lateral pain. He did have significant tenderness overlying the greater trochanter.     After right greater troch bursa injection on 11/30/23, he has noticed almost complete resolution of his residual lateral hip symptoms.      Plan:    Patient was made aware he can follow up anytime for repeat SIJ or hip bursa injections.     Today he has worsening pain overlying the right greater trochanteric region consistent with greater trochanteric bursitis.  Discussed and offered scheduled for right greater trochanteric bursa injection.    Recommend to obtain updated lumbar MRI given worsening of right radiating leg pains with numbness and pins/needles and history of lumbar spinal surgery.    Reviewed  "pertinent laboratory studies, specifically renal function, hemoglobin A1c, CBC, coagulation studies, prior to recommending medication therapies/interventional treatment options.    My impressions and treatment recommendations were discussed in detail with the patient who verbalized understanding and had no further questions.  Discharge instructions were provided. I personally saw and examined the patient and I agree with the above discussed plan of care.    Orders Placed This Encounter   Procedures    MRI lumbar spine wo contrast     Standing Status:   Future     Expected Date:   1/22/2025     Expiration Date:   1/22/2029     Scheduling Instructions:      There is no preparation for this test. Please leave your jewelry and valuables at home, wedding rings are the exception. All patients will be required to change into a hospital gown and pants.  Street clothes are not permitted in the MRI.  Magnetic nail polish must be removed prior to arrival for your test. Please bring your insurance cards, a form of photo ID and a list of your medications with you. Arrive 15 minutes prior to your appointment time in order to register. Please bring any prior CT or MRI studies of this area that were not performed at a [facility].            To schedule this appointment, please contact Central Scheduling at [phone].            Prior to your appointment, please make sure you complete the MRI Screening Form when you e-Check in for your appointment. This will be available starting 7 days before your appointment in [email or web address]. You may receive an e-mail with an activation code if you do not have a [email or web address] account. If you do not have access to a device, we will complete your screening at your appointment.     Reason for Exam:   worsening right leg pain and numbness from lumbar region     For OP exams needed \"URGENT\", choose the appropriate timeframe below and call Central Scheduling at [phone]. No need to speak with a " Radiologist.:   Not URGENT     What is the patient's sedation requirement?:   No Sedation     Does the patient need medication for Claustrophobia? If yes, order medication at this point.:   No     Does the patient wear a life vest, have an implanted cardiac device, a stimulation device, a sleep apnea stimulator, or a breast tissue expansion device?:   Yes     If yes, what type of device is implanted?:   Pacemaker     Release to patient through Mychart:   Immediate    FL spine and pain procedure     Standing Status:   Future     Expected Date:   1/22/2025     Expiration Date:   1/22/2029     Reason for Exam::   right hip bursa injection     Anticoagulant hold needed?:   no     No orders of the defined types were placed in this encounter.      History of Present Illness:  Lyndon Chairez is a 84 y.o. male who presents for a follow up office visit in regards to Back Pain.   The patient’s current symptoms include worsening right hip, knee and leg pain symptoms.  Pain is rated 8 out of 10 described as a constant sharp, throbbing pain with numbness and pins/needles throughout the entire day.    I have personally reviewed and/or updated the patient's past medical history, past surgical history, family history, social history, current medications, allergies, and vital signs today.     Review of Systems   Constitutional:  Negative for chills and fever.   HENT:  Negative for ear pain and sore throat.    Eyes:  Negative for pain and visual disturbance.   Respiratory:  Negative for cough and shortness of breath.    Cardiovascular:  Negative for chest pain and palpitations.   Gastrointestinal:  Negative for abdominal pain and vomiting.   Genitourinary:  Negative for dysuria and hematuria.   Musculoskeletal:  Positive for arthralgias, back pain, gait problem and myalgias.   Skin:  Negative for color change and rash.   Neurological:  Positive for weakness. Negative for seizures and syncope.   All other systems reviewed and are  negative.      Patient Active Problem List   Diagnosis    Obesity, morbid (HCC)    Atypical chest pain    Dysphagia    Sick sinus syndrome (HCC)    Paroxysmal atrial fibrillation (HCC)    BPH (benign prostatic hyperplasia)    Type 2 diabetes mellitus (HCC)    COVID-19    Lung nodules    Excessive daytime sleepiness    Sacroiliitis (HCC)    Greater trochanteric bursitis of right hip    Aortic stenosis    HTN (hypertension)    NSVT (nonsustained ventricular tachycardia) (HCC)    Dyslipidemia    Coronary artery calcification    JAMES (obstructive sleep apnea)    Thoracic aortic aneurysm without rupture (HCC)    CKD (chronic kidney disease) stage 2, GFR 60-89 ml/min    Failed total knee arthroplasty  (HCC)       Past Medical History:   Diagnosis Date    Diabetes (HCC)     Essential tremor     Hyperlipidemia     Hypertension     Obstructive sleep apnea     Paroxysmal atrial fibrillation (HCC)     Sick sinus syndrome (HCC)        Past Surgical History:   Procedure Laterality Date    CARDIAC ELECTROPHYSIOLOGY PROCEDURE N/A 2024    Procedure: Cardiac pacer generator change DC PM GEN CHANGE;  Surgeon: Wilton Esquivel DO;  [facility];  Service: Cardiology    CARDIAC ELECTROPHYSIOLOGY PROCEDURE N/A 2024    Procedure: Cardiac pocket revision;  Surgeon: Wilton Esquivel DO;  [facility];  Service: Cardiology    CT NEEDLE BIOPSY LUNG  2021    CT NEEDLE BIOPSY LUNG  2021    FL GUIDED NEEDLE PLAC BX/ASP/INJ  3/11/2024       History reviewed. No pertinent family history.    Social History     Occupational History    Not on file   Tobacco Use    Smoking status: Former     Current packs/day: 0.00     Average packs/day: 2.0 packs/day for 7.0 years (14.0 ttl pk-yrs)     Types: Cigarettes     Start date:      Quit date: 1965     Years since quittin.0    Smokeless tobacco: Never   Vaping Use    Vaping status: Never Used   Substance and Sexual Activity    Alcohol use: Not Currently     Drug use: Never    Sexual activity: Not on file       Current Outpatient Medications on File Prior to Visit   Medication Sig    acetaminophen (TYLENOL) 500 mg tablet Take 500 mg by mouth every 6 (six) hours as needed    albuterol (PROVENTIL HFA,VENTOLIN HFA) 90 mcg/act inhaler Inhale 2 puffs every 6 (six) hours as needed    allopurinol (ZYLOPRIM) 100 mg tablet Take 100 mg by mouth daily    Ascorbic Acid (VITAMIN C PO) Take 2 tablets by mouth every morning    bisacodyl (DULCOLAX) 5 mg EC tablet Take 1 tablet (5 mg total) by mouth daily as needed for constipation    CHOLECALCIFEROL PO Take 2 tablets by mouth every morning    colchicine (COLCRYS) 0.6 mg tablet Take 0.6 mg by mouth 2 (two) times a day    cyanocobalamin (VITAMIN B-12) 1000 MCG tablet Take 1,000 mcg by mouth    dabigatran etexilate (PRADAXA) 150 mg capsu Take 1 capsule (150 mg total) by mouth every 12 (twelve) hours    FERROUS FUMARATE PO Take 1 tablet by mouth every morning    furosemide (LASIX) 20 mg tablet Take 20 mg by mouth daily    gabapentin (Neurontin) 300 mg capsule Take 1 capsule (300 mg total) by mouth 3 (three) times a day    insulin degludec (TRESIBA) 100 units/mL injection pen Inject 80 Units under the skin    levothyroxine 112 mcg tablet     lisinopril (ZESTRIL) 5 mg tablet     loratadine (CLARITIN) 10 mg tablet Take 10 mg by mouth daily    metoprolol succinate (TOPROL-XL) 50 mg 24 hr tablet Take 1 tablet (50 mg total) by mouth daily Do not start before October 31, 2022.    oxyCODONE (ROXICODONE) 15 mg immediate release tablet Take 15 mg by mouth every 4 (four) hours as needed for moderate pain    pantoprazole (PROTONIX) 40 mg tablet Take 1 tablet (40 mg total) by mouth daily    polyethylene glycol (MIRALAX) 17 g packet Take 17 g by mouth daily    tamsulosin (FLOMAX) 0.4 mg Take 1 capsule (0.4 mg total) by mouth daily with dinner    azelastine (ASTELIN) 0.1 % nasal spray 1 spray into each nostril 2 (two) times a day Use in each nostril  "as directed    ezetimibe (ZETIA) 10 mg tablet Take 10 mg by mouth daily    ipratropium (ATROVENT) 0.03 % nasal spray 2 sprays into each nostril every 12 (twelve) hours     No current facility-administered medications on file prior to visit.       Allergies   Allergen Reactions    Aspirin GI Bleeding and Other (See Comments)    Sitagliptin Hives and Rash    Canagliflozin Rash and Hives     Burning with urination and kidneys started to fail       Physical Exam:    Ht 5' 10.5\" (1.791 m)   Wt 129 kg (285 lb)   BMI 40.32 kg/m²     Constitutional:normal, well developed, well nourished, alert, in no distress and non-toxic and no overt pain behavior.  Eyes:anicteric  HEENT:grossly intact  Neck:supple, symmetric, trachea midline and no masses   Pulmonary:even and unlabored  Cardiovascular:No edema or pitting edema present  Skin:Normal without rashes or lesions and well hydrated  Psychiatric:Mood and affect appropriate  Neurologic: Motor function is grossly intact with no focal neurologic deficits   Musculoskeletal: Slow gait favoring left lower extremity.  Use of walker for ambulation at baseline.  Obese.    Imaging    "

## 2025-01-23 ENCOUNTER — OFFICE VISIT (OUTPATIENT)
Dept: OTOLARYNGOLOGY | Facility: CLINIC | Age: 85
End: 2025-01-23

## 2025-01-23 VITALS — WEIGHT: 285 LBS | HEIGHT: 69 IN | BODY MASS INDEX: 42.21 KG/M2

## 2025-01-23 DIAGNOSIS — H90.6 MIXED CONDUCTIVE AND SENSORINEURAL HEARING LOSS OF BOTH EARS: Primary | ICD-10-CM

## 2025-01-23 DIAGNOSIS — H72.91 PERFORATION OF RIGHT TYMPANIC MEMBRANE: ICD-10-CM

## 2025-01-23 DIAGNOSIS — H69.92 ETD (EUSTACHIAN TUBE DYSFUNCTION), LEFT: ICD-10-CM

## 2025-01-23 DIAGNOSIS — H61.23 BILATERAL IMPACTED CERUMEN: ICD-10-CM

## 2025-01-23 NOTE — PROGRESS NOTES
Consultation - Otolaryngology - Head and Neck Surgery  Lyndon Chairez 84 y.o. male MRN: [id number]  Encounter: [id number]        Assessment/Plan:  1. Mixed conductive and sensorineural hearing loss of both ears  Ambulatory Referral to Audiology      2. Perforation of right tympanic membrane        3. ETD (Eustachian tube dysfunction), left        4. Bilateral impacted cerumen            Cerumen was removed from bilateral auditory canals. There was right tympanic membrane perforation for which dry ear precautions were advised. The left tympanic membrane was retracted, and patient reports improvement of hearing with valsalva maneuver. His audiogram from 2023 was reviewed which showed mixed hearing loss. Recommend obtaining updated audiogram and he will follow up afterwards to review, determine if he would benefit from left myringotomy with PET placement. He will continue with amplification for now.    History of Present Illness   Physician Requesting Consult: Adrianna Garcia MD   Reason for Consult / Principal Problem: Ear Cleaning, Hearing Loss  HPI: Lyndon Chairez is a 84 y.o. year old male who presents for evaluation. He requires cerumen removal. He has history of right tympanic membrane perforation and left tympanoplasty in the past. He uses hearing aids. Audiometry from 2023 showed bilateral mixed hearing loss. He reports improvement of hearing in his left ear with valsalva maneuver.    Review of systems:  10 Point ROS was performed and negative except as above or otherwise noted in the medical record.    Historical Information   Past Medical History:   Diagnosis Date    Diabetes (HCC)     Essential tremor     Hyperlipidemia     Hypertension     Obstructive sleep apnea     Paroxysmal atrial fibrillation (HCC)     Sick sinus syndrome (HCC)      Past Surgical History:   Procedure Laterality Date    CARDIAC ELECTROPHYSIOLOGY PROCEDURE N/A 8/26/2024    Procedure: Cardiac pacer generator change DC PM GEN CHANGE;   Surgeon: Wilton Esquivel DO;  [facility];  Service: Cardiology    CARDIAC ELECTROPHYSIOLOGY PROCEDURE N/A 2024    Procedure: Cardiac pocket revision;  Surgeon: Wilton Esquivel DO;  [facility];  Service: Cardiology    CT NEEDLE BIOPSY LUNG  2021    CT NEEDLE BIOPSY LUNG  2021    FL GUIDED NEEDLE PLAC BX/ASP/INJ  3/11/2024     Social History   Social History     Substance and Sexual Activity   Alcohol Use Not Currently     Social History     Substance and Sexual Activity   Drug Use Never     Social History     Tobacco Use   Smoking Status Former    Current packs/day: 0.00    Average packs/day: 2.0 packs/day for 7.0 years (14.0 ttl pk-yrs)    Types: Cigarettes    Start date:     Quit date:     Years since quittin.1   Smokeless Tobacco Never       Current Outpatient Medications on File Prior to Visit   Medication Sig    acetaminophen (TYLENOL) 500 mg tablet Take 500 mg by mouth every 6 (six) hours as needed    albuterol (PROVENTIL HFA,VENTOLIN HFA) 90 mcg/act inhaler Inhale 2 puffs every 6 (six) hours as needed    allopurinol (ZYLOPRIM) 100 mg tablet Take 100 mg by mouth daily    Ascorbic Acid (VITAMIN C PO) Take 2 tablets by mouth every morning    azelastine (ASTELIN) 0.1 % nasal spray 1 spray into each nostril 2 (two) times a day Use in each nostril as directed    bisacodyl (DULCOLAX) 5 mg EC tablet Take 1 tablet (5 mg total) by mouth daily as needed for constipation    CHOLECALCIFEROL PO Take 2 tablets by mouth every morning    colchicine (COLCRYS) 0.6 mg tablet Take 0.6 mg by mouth 2 (two) times a day    cyanocobalamin (VITAMIN B-12) 1000 MCG tablet Take 1,000 mcg by mouth    dabigatran etexilate (PRADAXA) 150 mg capsu Take 1 capsule (150 mg total) by mouth every 12 (twelve) hours    ezetimibe (ZETIA) 10 mg tablet Take 10 mg by mouth daily    FERROUS FUMARATE PO Take 1 tablet by mouth every morning    furosemide (LASIX) 20 mg tablet Take 20 mg by mouth  daily    gabapentin (Neurontin) 300 mg capsule Take 1 capsule (300 mg total) by mouth 3 (three) times a day    insulin degludec (TRESIBA) 100 units/mL injection pen Inject 80 Units under the skin    ipratropium (ATROVENT) 0.03 % nasal spray 2 sprays into each nostril every 12 (twelve) hours    levothyroxine 112 mcg tablet     lisinopril (ZESTRIL) 5 mg tablet     loratadine (CLARITIN) 10 mg tablet Take 10 mg by mouth daily    metoprolol succinate (TOPROL-XL) 50 mg 24 hr tablet Take 1 tablet (50 mg total) by mouth daily Do not start before October 31, 2022.    oxyCODONE (ROXICODONE) 15 mg immediate release tablet Take 15 mg by mouth every 4 (four) hours as needed for moderate pain    pantoprazole (PROTONIX) 40 mg tablet Take 1 tablet (40 mg total) by mouth daily    polyethylene glycol (MIRALAX) 17 g packet Take 17 g by mouth daily    tamsulosin (FLOMAX) 0.4 mg Take 1 capsule (0.4 mg total) by mouth daily with dinner     No current facility-administered medications on file prior to visit.       Allergies   Allergen Reactions    Aspirin GI Bleeding and Other (See Comments)    Sitagliptin Hives and Rash    Canagliflozin Rash and Hives     Burning with urination and kidneys started to fail       There were no vitals filed for this visit.    Physical Exam   Constitutional: Oriented to person, place, and time. Well-developed and well-nourished, no apparent distress, non-toxic appearance. Cooperative, able to hear and answer questions without difficulty.    Voice: Voice quality is normal.  Head: Normocephalic, atraumatic. No scars, masses or lesions.  Face: Symmetric, no edema. Facial nerve function is symmetric/fully intact bilaterally.  Ears: External ears are normal bilaterally. Cerumen present in auditory canals bilaterally. There is small dry perforation of right tympanic membrane. Left tympanic membrane is retracted.  Nose: External nose is normal. Septum is midline. Turbinates are normal.  Oral cavity: Dentition  intact. Mucosa moist, lips without lesions or masses. Tongue mobile, floor of mouth soft and flat. Hard palate without masses or lesions.   Oropharynx: Uvula is midline. Soft palate without masses or lesions. Posterior pharynx is clear without masses or lesions present.  Neck: Trachea is midline. No masses or lesions present. No palpable adenopathy. Thyroid is without tenderness or palpable nodules. Parotid and submandibular glands are normal.

## 2025-01-30 ENCOUNTER — HOSPITAL ENCOUNTER (OUTPATIENT)
Dept: RADIOLOGY | Facility: MEDICAL CENTER | Age: 85
End: 2025-01-30
Payer: MEDICARE

## 2025-01-30 VITALS
OXYGEN SATURATION: 95 % | HEART RATE: 71 BPM | RESPIRATION RATE: 18 BRPM | DIASTOLIC BLOOD PRESSURE: 78 MMHG | TEMPERATURE: 97.9 F | SYSTOLIC BLOOD PRESSURE: 130 MMHG

## 2025-01-30 DIAGNOSIS — M70.61 GREATER TROCHANTERIC BURSITIS OF RIGHT HIP: ICD-10-CM

## 2025-01-30 PROCEDURE — 77002 NEEDLE LOCALIZATION BY XRAY: CPT

## 2025-01-30 PROCEDURE — 20610 DRAIN/INJ JOINT/BURSA W/O US: CPT | Performed by: ANESTHESIOLOGY

## 2025-01-30 PROCEDURE — 77002 NEEDLE LOCALIZATION BY XRAY: CPT | Performed by: ANESTHESIOLOGY

## 2025-01-30 RX ORDER — METHYLPREDNISOLONE ACETATE 40 MG/ML
40 INJECTION, SUSPENSION INTRA-ARTICULAR; INTRALESIONAL; INTRAMUSCULAR; PARENTERAL; SOFT TISSUE ONCE
Status: COMPLETED | OUTPATIENT
Start: 2025-01-30 | End: 2025-01-30

## 2025-01-30 RX ORDER — LIDOCAINE HYDROCHLORIDE 10 MG/ML
5 INJECTION, SOLUTION EPIDURAL; INFILTRATION; INTRACAUDAL; PERINEURAL ONCE
Status: COMPLETED | OUTPATIENT
Start: 2025-01-30 | End: 2025-01-30

## 2025-01-30 RX ORDER — ROPIVACAINE HYDROCHLORIDE 2 MG/ML
3 INJECTION, SOLUTION EPIDURAL; INFILTRATION; PERINEURAL ONCE
Status: COMPLETED | OUTPATIENT
Start: 2025-01-30 | End: 2025-01-30

## 2025-01-30 RX ADMIN — IOHEXOL 1 ML: 300 INJECTION, SOLUTION INTRAVENOUS at 08:39

## 2025-01-30 RX ADMIN — METHYLPREDNISOLONE ACETATE 40 MG: 40 INJECTION, SUSPENSION INTRA-ARTICULAR; INTRALESIONAL; INTRAMUSCULAR; SOFT TISSUE at 08:40

## 2025-01-30 RX ADMIN — ROPIVACAINE HYDROCHLORIDE 3 ML: 2 INJECTION, SOLUTION EPIDURAL; INFILTRATION at 08:40

## 2025-01-30 RX ADMIN — LIDOCAINE HYDROCHLORIDE 5 ML: 10 INJECTION, SOLUTION EPIDURAL; INFILTRATION; INTRACAUDAL at 08:38

## 2025-01-30 NOTE — DISCHARGE INSTR - LAB

## 2025-01-30 NOTE — H&P
History of Present Illness: The patient is a 84 y.o. male who presents with complaints of hip pain    Past Medical History:   Diagnosis Date    Diabetes (HCC)     Essential tremor     Hyperlipidemia     Hypertension     Obstructive sleep apnea     Paroxysmal atrial fibrillation (HCC)     Sick sinus syndrome (HCC)        Past Surgical History:   Procedure Laterality Date    CARDIAC ELECTROPHYSIOLOGY PROCEDURE N/A 8/26/2024    Procedure: Cardiac pacer generator change DC PM GEN CHANGE;  Surgeon: Wilton Esquivel DO;  [facility];  Service: Cardiology    CARDIAC ELECTROPHYSIOLOGY PROCEDURE N/A 8/26/2024    Procedure: Cardiac pocket revision;  Surgeon: Wilton Esquivel DO;  [facility];  Service: Cardiology    CT NEEDLE BIOPSY LUNG  2/4/2021    CT NEEDLE BIOPSY LUNG  2/4/2021    FL GUIDED NEEDLE PLAC BX/ASP/INJ  3/11/2024         Current Outpatient Medications:     acetaminophen (TYLENOL) 500 mg tablet, Take 500 mg by mouth every 6 (six) hours as needed, Disp: , Rfl:     albuterol (PROVENTIL HFA,VENTOLIN HFA) 90 mcg/act inhaler, Inhale 2 puffs every 6 (six) hours as needed, Disp: , Rfl:     allopurinol (ZYLOPRIM) 100 mg tablet, Take 100 mg by mouth daily, Disp: , Rfl:     Ascorbic Acid (VITAMIN C PO), Take 2 tablets by mouth every morning, Disp: , Rfl:     azelastine (ASTELIN) 0.1 % nasal spray, 1 spray into each nostril 2 (two) times a day Use in each nostril as directed, Disp: 5 mL, Rfl: 3    bisacodyl (DULCOLAX) 5 mg EC tablet, Take 1 tablet (5 mg total) by mouth daily as needed for constipation, Disp: 60 tablet, Rfl: 5    CHOLECALCIFEROL PO, Take 2 tablets by mouth every morning, Disp: , Rfl:     colchicine (COLCRYS) 0.6 mg tablet, Take 0.6 mg by mouth 2 (two) times a day, Disp: , Rfl:     cyanocobalamin (VITAMIN B-12) 1000 MCG tablet, Take 1,000 mcg by mouth, Disp: , Rfl:     dabigatran etexilate (PRADAXA) 150 mg capsu, Take 1 capsule (150 mg total) by mouth every 12 (twelve) hours,  Disp: , Rfl: 0    ezetimibe (ZETIA) 10 mg tablet, Take 10 mg by mouth daily, Disp: , Rfl:     FERROUS FUMARATE PO, Take 1 tablet by mouth every morning, Disp: , Rfl:     furosemide (LASIX) 20 mg tablet, Take 20 mg by mouth daily, Disp: , Rfl:     gabapentin (Neurontin) 300 mg capsule, Take 1 capsule (300 mg total) by mouth 3 (three) times a day, Disp: 90 capsule, Rfl: 0    insulin degludec (TRESIBA) 100 units/mL injection pen, Inject 80 Units under the skin, Disp: , Rfl:     ipratropium (ATROVENT) 0.03 % nasal spray, 2 sprays into each nostril every 12 (twelve) hours, Disp: 10 mL, Rfl: 1    levothyroxine 112 mcg tablet, , Disp: , Rfl:     lisinopril (ZESTRIL) 5 mg tablet, , Disp: , Rfl:     loratadine (CLARITIN) 10 mg tablet, Take 10 mg by mouth daily, Disp: , Rfl:     metoprolol succinate (TOPROL-XL) 50 mg 24 hr tablet, Take 1 tablet (50 mg total) by mouth daily Do not start before October 31, 2022., Disp: , Rfl: 0    oxyCODONE (ROXICODONE) 15 mg immediate release tablet, Take 15 mg by mouth every 4 (four) hours as needed for moderate pain, Disp: , Rfl:     pantoprazole (PROTONIX) 40 mg tablet, Take 1 tablet (40 mg total) by mouth daily, Disp: 30 tablet, Rfl: 2    polyethylene glycol (MIRALAX) 17 g packet, Take 17 g by mouth daily, Disp: 1700 g, Rfl: 3    tamsulosin (FLOMAX) 0.4 mg, Take 1 capsule (0.4 mg total) by mouth daily with dinner, Disp: , Rfl: 0    Current Facility-Administered Medications:     iohexol (OMNIPAQUE) 300 mg/mL injection 1 mL, 1 mL, Intra-articular, Once, Will Heidi Slater MD    lidocaine (PF) (XYLOCAINE-MPF) 1 % injection 5 mL, 5 mL, Infiltration, Once, Will Heidi Slater MD    methylPREDNISolone acetate (DEPO-MEDROL) injection 40 mg, 40 mg, Intra-articular, Once, Will Heidi Slater MD    ropivacaine (NAROPIN) injection 3 mL, 3 mL, Intra-articular, Once, Will Heidi Salter MD    Allergies   Allergen Reactions    Aspirin GI Bleeding and Other (See Comments)    Sitagliptin Hives and Rash     Canagliflozin Rash and Hives     Burning with urination and kidneys started to fail       Physical Exam:   Vitals:    01/30/25 0821   BP: 130/82   Pulse: 67   Resp: 18   Temp: 97.9 °F (36.6 °C)   SpO2: 96%       General: Awake, Alert, Oriented x 3, Mood and affect appropriate  Respiratory: Respirations even and unlabored  Cardiovascular: Peripheral pulses intact; no edema  Musculoskeletal Exam: hip pain    ASA Score: 3    Patient/Chart Verification  Patient ID Verified: Verbal  ID Band Applied: No  Consents Confirmed: To be obtained in the Procedural area  Interval H&P(within 24 hr) Complete (required for Outpatients and Surgery Admit only): To be obtained in the Procedural area  Allergies Reviewed: Yes  Anticoag/NSAID held?: NA  Currently on antibiotics?: No    Assessment:   1. Greater trochanteric bursitis of right hip        Plan: right hip bursa injection

## 2025-02-13 ENCOUNTER — TELEPHONE (OUTPATIENT)
Dept: PAIN MEDICINE | Facility: CLINIC | Age: 85
End: 2025-02-13

## 2025-03-12 ENCOUNTER — REMOTE DEVICE CLINIC VISIT (OUTPATIENT)
Dept: CARDIOLOGY CLINIC | Facility: CLINIC | Age: 85
End: 2025-03-12
Payer: MEDICARE

## 2025-03-12 ENCOUNTER — OFFICE VISIT (OUTPATIENT)
Dept: PAIN MEDICINE | Facility: CLINIC | Age: 85
End: 2025-03-12
Payer: MEDICARE

## 2025-03-12 ENCOUNTER — RESULTS FOLLOW-UP (OUTPATIENT)
Dept: CARDIOLOGY CLINIC | Facility: CLINIC | Age: 85
End: 2025-03-12

## 2025-03-12 VITALS — WEIGHT: 284.39 LBS | BODY MASS INDEX: 42.12 KG/M2 | HEIGHT: 69 IN

## 2025-03-12 DIAGNOSIS — Z95.0 CARDIAC PACEMAKER IN SITU: Primary | ICD-10-CM

## 2025-03-12 DIAGNOSIS — Z98.890 HISTORY OF LUMBAR LAMINECTOMY: ICD-10-CM

## 2025-03-12 DIAGNOSIS — M79.18 MYOFASCIAL PAIN SYNDROME: Primary | ICD-10-CM

## 2025-03-12 PROCEDURE — G2211 COMPLEX E/M VISIT ADD ON: HCPCS | Performed by: ANESTHESIOLOGY

## 2025-03-12 PROCEDURE — 99214 OFFICE O/P EST MOD 30 MIN: CPT | Performed by: ANESTHESIOLOGY

## 2025-03-12 PROCEDURE — 93296 REM INTERROG EVL PM/IDS: CPT | Performed by: INTERNAL MEDICINE

## 2025-03-12 PROCEDURE — 93294 REM INTERROG EVL PM/LDLS PM: CPT | Performed by: INTERNAL MEDICINE

## 2025-03-12 NOTE — PROGRESS NOTES
Assessment:  1. Myofascial pain syndrome    2. History of lumbar laminectomy      Patient presenting for follow up visit. He has a history of chronic right > left sided back/hip pain radiating to the buttock, lateral hip and posterior thigh for >1 year with waxing and waning significant pain episodes.     Pain is multifactorial with components of lumbar radiculopathy, sacroiliac joint dysfunction, greater trochanteric bursitis, myofascial pain and accompanied by pain at times >7/10 on the pain scale with inability to participate in IADLs for >6 weeks. Patient has fully participated with physical therapy in the past with no benefit.  Has been taking oxycodone, Tylenol with minimal benefit.  Denies any bowel or bladder incontinence, saddle anesthesia.     Reviewed and interpreted lumbar and hip XRs. This shows multilevel extensive facet arthropathy in the lumbar spine. Hip/pelvis x-rays show stable right total hip arthroplasty.     Patient reports a history of bilateral laminectomies at L3-4 and L4-5 in 2016.  Subsequently he did have significant benefit with right L3 and L4 transforaminal LEANNE's at [facility] around 2020.      After R SIJ injection on 10/5/23, he noticed significant improvement of his lower lumbar pain but continues to have more lateral pain. He did have significant tenderness overlying the greater trochanter.     After recent repeat R hip bursa injection, he notes no improvement. His pain is isolated to the R buttock region at this time.      Plan:    Discussed and offered US guided trigger point injection for his focal myofascial symptoms in the R gluteal region.    Recommend to obtain updated lumbar MRI given worsening of right radiating leg pains with numbness and pins/needles and history of lumbar spinal surgery.    Reviewed pertinent laboratory studies, specifically renal function, hemoglobin A1c, CBC, coagulation studies, prior to recommending medication therapies/interventional treatment  options.    My impressions and treatment recommendations were discussed in detail with the patient who verbalized understanding and had no further questions.  Discharge instructions were provided. I personally saw and examined the patient and I agree with the above discussed plan of care.    Orders Placed This Encounter   Procedures    US msk guidance     Right US guided TPI for SPA     Standing Status:   Future     Expected Date:   3/12/2025     Expiration Date:   3/12/2029     Indicate laterality/region of interest (please specify in COMMENTS):   Right     No orders of the defined types were placed in this encounter.      History of Present Illness:  Lyndon Chairez is a 84 y.o. male who presents for a follow up office visit in regards to Hip Pain and Knee Pain.   The patient’s current symptoms include worsening right hip, knee and leg pain symptoms.  Pain is rated 8 out of 10 described as a constant sharp, throbbing pain with numbness and pins/needles throughout the entire day.    I have personally reviewed and/or updated the patient's past medical history, past surgical history, family history, social history, current medications, allergies, and vital signs today.     Review of Systems   Constitutional:  Negative for chills and fever.   HENT:  Negative for ear pain and sore throat.    Eyes:  Negative for pain and visual disturbance.   Respiratory:  Negative for cough and shortness of breath.    Cardiovascular:  Negative for chest pain and palpitations.   Gastrointestinal:  Negative for abdominal pain and vomiting.   Genitourinary:  Negative for dysuria and hematuria.   Musculoskeletal:  Positive for arthralgias, back pain, gait problem and myalgias.   Skin:  Negative for color change and rash.   Neurological:  Positive for weakness. Negative for seizures and syncope.   All other systems reviewed and are negative.      Patient Active Problem List   Diagnosis    Obesity, morbid (HCC)    Atypical chest pain     Dysphagia    Sick sinus syndrome (HCC)    Paroxysmal atrial fibrillation (HCC)    BPH (benign prostatic hyperplasia)    Type 2 diabetes mellitus (HCC)    COVID-19    Lung nodules    Excessive daytime sleepiness    Sacroiliitis (HCC)    Greater trochanteric bursitis of right hip    Aortic stenosis    HTN (hypertension)    NSVT (nonsustained ventricular tachycardia) (HCC)    Dyslipidemia    Coronary artery calcification    JAMES (obstructive sleep apnea)    Thoracic aortic aneurysm without rupture (HCC)    CKD (chronic kidney disease) stage 2, GFR 60-89 ml/min    Failed total knee arthroplasty  (HCC)       Past Medical History:   Diagnosis Date    Diabetes (HCC)     Essential tremor     Hyperlipidemia     Hypertension     Obstructive sleep apnea     Paroxysmal atrial fibrillation (HCC)     Sick sinus syndrome (HCC)        Past Surgical History:   Procedure Laterality Date    CARDIAC ELECTROPHYSIOLOGY PROCEDURE N/A 2024    Procedure: Cardiac pacer generator change DC PM GEN CHANGE;  Surgeon: Wilton Esquivel DO;  [facility];  Service: Cardiology    CARDIAC ELECTROPHYSIOLOGY PROCEDURE N/A 2024    Procedure: Cardiac pocket revision;  Surgeon: Wilton Esquivel DO;  [facility];  Service: Cardiology    CT NEEDLE BIOPSY LUNG  2021    CT NEEDLE BIOPSY LUNG  2021    FL GUIDED NEEDLE PLAC BX/ASP/INJ  3/11/2024       No family history on file.    Social History     Occupational History    Not on file   Tobacco Use    Smoking status: Former     Current packs/day: 0.00     Average packs/day: 2.0 packs/day for 7.0 years (14.0 ttl pk-yrs)     Types: Cigarettes     Start date:      Quit date: 1965     Years since quittin.2    Smokeless tobacco: Never   Vaping Use    Vaping status: Never Used   Substance and Sexual Activity    Alcohol use: Not Currently    Drug use: Never    Sexual activity: Not on file       Current Outpatient Medications on File Prior to Visit   Medication Sig     acetaminophen (TYLENOL) 500 mg tablet Take 500 mg by mouth every 6 (six) hours as needed    albuterol (PROVENTIL HFA,VENTOLIN HFA) 90 mcg/act inhaler Inhale 2 puffs every 6 (six) hours as needed    allopurinol (ZYLOPRIM) 100 mg tablet Take 100 mg by mouth daily    Ascorbic Acid (VITAMIN C PO) Take 2 tablets by mouth every morning    bisacodyl (DULCOLAX) 5 mg EC tablet Take 1 tablet (5 mg total) by mouth daily as needed for constipation    CHOLECALCIFEROL PO Take 2 tablets by mouth every morning    colchicine (COLCRYS) 0.6 mg tablet Take 0.6 mg by mouth 2 (two) times a day    cyanocobalamin (VITAMIN B-12) 1000 MCG tablet Take 1,000 mcg by mouth    dabigatran etexilate (PRADAXA) 150 mg capsu Take 1 capsule (150 mg total) by mouth every 12 (twelve) hours    FERROUS FUMARATE PO Take 1 tablet by mouth every morning    furosemide (LASIX) 20 mg tablet Take 20 mg by mouth daily    gabapentin (Neurontin) 300 mg capsule Take 1 capsule (300 mg total) by mouth 3 (three) times a day    insulin degludec (TRESIBA) 100 units/mL injection pen Inject 80 Units under the skin    levothyroxine 112 mcg tablet     lisinopril (ZESTRIL) 5 mg tablet     loratadine (CLARITIN) 10 mg tablet Take 10 mg by mouth daily    metoprolol succinate (TOPROL-XL) 50 mg 24 hr tablet Take 1 tablet (50 mg total) by mouth daily Do not start before October 31, 2022.    oxyCODONE (ROXICODONE) 15 mg immediate release tablet Take 15 mg by mouth every 4 (four) hours as needed for moderate pain    pantoprazole (PROTONIX) 40 mg tablet Take 1 tablet (40 mg total) by mouth daily    polyethylene glycol (MIRALAX) 17 g packet Take 17 g by mouth daily    tamsulosin (FLOMAX) 0.4 mg Take 1 capsule (0.4 mg total) by mouth daily with dinner    azelastine (ASTELIN) 0.1 % nasal spray 1 spray into each nostril 2 (two) times a day Use in each nostril as directed    ezetimibe (ZETIA) 10 mg tablet Take 10 mg by mouth daily    ipratropium (ATROVENT) 0.03 % nasal spray 2 sprays  "into each nostril every 12 (twelve) hours     No current facility-administered medications on file prior to visit.       Allergies   Allergen Reactions    Aspirin GI Bleeding and Other (See Comments)    Sitagliptin Hives and Rash    Canagliflozin Rash and Hives     Burning with urination and kidneys started to fail       Physical Exam:    Ht 5' 9\" (1.753 m)   Wt 129 kg (284 lb 6.3 oz)   BMI 42.00 kg/m²     Constitutional:normal, well developed, well nourished, alert, in no distress and non-toxic and no overt pain behavior.  Eyes:anicteric  HEENT:grossly intact  Neck:supple, symmetric, trachea midline and no masses   Pulmonary:even and unlabored  Cardiovascular:No edema or pitting edema present  Skin:Normal without rashes or lesions and well hydrated  Psychiatric:Mood and affect appropriate  Neurologic: Motor function is grossly intact with no focal neurologic deficits   Musculoskeletal: Slow gait favoring left lower extremity.  Use of walker for ambulation at baseline.  Obese.    Imaging    "

## 2025-03-12 NOTE — PROGRESS NOTES
Results for orders placed or performed in visit on 03/12/25   Cardiac EP device report    Narrative    MDT DC PPM (MVP ON) - ACTIVE SYSTEM IS MRI CONDITIONAL  CARELINK TRANSMISSION: BATTERY VOLTAGE ADEQUATE (13 YRS). AP-65%, -37%. ALL AVAILABLE LEAD PARAMETERS WITHIN NORMAL LIMITS. NO SIGNIFICANT HIGH RATE EPISODES. NORMAL DEVICE FUNCTION. GV

## 2025-03-17 ENCOUNTER — PROCEDURE VISIT (OUTPATIENT)
Dept: PAIN MEDICINE | Facility: CLINIC | Age: 85
End: 2025-03-17
Payer: MEDICARE

## 2025-03-17 VITALS — WEIGHT: 284.39 LBS | HEIGHT: 69 IN | BODY MASS INDEX: 42.12 KG/M2

## 2025-03-17 DIAGNOSIS — M79.18 MYOFASCIAL PAIN SYNDROME: ICD-10-CM

## 2025-03-17 PROCEDURE — 76942 ECHO GUIDE FOR BIOPSY: CPT | Performed by: ANESTHESIOLOGY

## 2025-03-17 PROCEDURE — 20552 NJX 1/MLT TRIGGER POINT 1/2: CPT | Performed by: ANESTHESIOLOGY

## 2025-03-17 RX ORDER — BUPIVACAINE HYDROCHLORIDE 5 MG/ML
4 INJECTION, SOLUTION PERINEURAL ONCE
Status: COMPLETED | OUTPATIENT
Start: 2025-03-17 | End: 2025-03-17

## 2025-03-17 RX ORDER — TRIAMCINOLONE ACETONIDE 40 MG/ML
40 INJECTION, SUSPENSION INTRA-ARTICULAR; INTRAMUSCULAR ONCE
Status: COMPLETED | OUTPATIENT
Start: 2025-03-17 | End: 2025-03-17

## 2025-03-17 RX ADMIN — BUPIVACAINE HYDROCHLORIDE 4 ML: 5 INJECTION, SOLUTION PERINEURAL at 14:10

## 2025-03-17 RX ADMIN — TRIAMCINOLONE ACETONIDE 40 MG: 40 INJECTION, SUSPENSION INTRA-ARTICULAR; INTRAMUSCULAR at 14:10

## 2025-03-17 NOTE — PROGRESS NOTES
Procedure Note  Indication: buttock pain, right  Preoperative diagnosis: Myofascial pain syndrome  Postoperative diagnosis: Same    Procedure: Ultrasound guided trigger point injection    Muscles targeted: right lateral gluteus maximum (1 site)  Medications: 4mL used of 10mL mixture containing 40mg Kenalog with 5mL 0.5% bupivacaine     After discussing the risks, benefits, and alternatives to the procedure, the patient expressed understanding and wished to proceed. The patient was placed in sitting position. A procedural pause was conducted to verify: Correct patient identity, procedure to be performed and as applicable, correct side and site, correct patient position, and availability of implants, special equipment or special requirements.    Following this, the area was prepped with Chloraprep. A 2 inch 25 gauge needle was advanced into the identified trigger points with live ultrasound guidance using a 8-12MHz linear ultrasound probe. After negative aspiration of blood, air, or bodily fluids; 4mL of the above injectate was injected into each trigger point.    The patient tolerated the procedure well and there were no apparent complications. After an appropriate amount of observation, the patient was dismissed from the office in good condition under their own power.

## 2025-03-27 ENCOUNTER — TELEPHONE (OUTPATIENT)
Age: 85
End: 2025-03-27

## 2025-03-31 ENCOUNTER — TELEPHONE (OUTPATIENT)
Dept: PAIN MEDICINE | Facility: CLINIC | Age: 85
End: 2025-03-31

## 2025-04-01 NOTE — QUICK NOTE
Investigation Report    Device investigated: Pacemaker            Method investigated (surgical report, imaging report, vendor contacted, website used etc): Epic, MRI Verify.    Time spent investigating in minutes: 15 minutes    Investigation findings: MR Conditional    Risk vs Benefit performed.  If so, list physician and outcome: n/a

## 2025-04-08 ENCOUNTER — HOSPITAL ENCOUNTER (OUTPATIENT)
Dept: RADIOLOGY | Facility: HOSPITAL | Age: 85
Discharge: HOME | End: 2025-04-08

## 2025-04-11 ENCOUNTER — OFFICE VISIT (OUTPATIENT)
Dept: PAIN MEDICINE | Facility: CLINIC | Age: 85
End: 2025-04-11
Payer: MEDICARE

## 2025-04-11 VITALS — BODY MASS INDEX: 42.06 KG/M2 | WEIGHT: 284 LBS | HEIGHT: 69 IN

## 2025-04-11 DIAGNOSIS — M79.18 MYOFASCIAL PAIN SYNDROME: Primary | ICD-10-CM

## 2025-04-11 DIAGNOSIS — M54.16 LUMBAR RADICULITIS: ICD-10-CM

## 2025-04-11 PROCEDURE — G2211 COMPLEX E/M VISIT ADD ON: HCPCS | Performed by: ANESTHESIOLOGY

## 2025-04-11 PROCEDURE — 99214 OFFICE O/P EST MOD 30 MIN: CPT | Performed by: ANESTHESIOLOGY

## 2025-04-11 NOTE — PROGRESS NOTES
Assessment:  1. Myofascial pain syndrome    2. Lumbar radiculitis      Patient presenting for follow up visit. He has a history of chronic right > left sided back/hip pain radiating to the buttock, lateral hip and posterior thigh for >1 year with waxing and waning significant pain episodes.     Pain is multifactorial with components of lumbar radiculopathy, sacroiliac joint dysfunction, greater trochanteric bursitis, myofascial pain and accompanied by pain at times >7/10 on the pain scale with inability to participate in IADLs for >6 weeks. Patient has fully participated with physical therapy in the past with no benefit.  Has been taking oxycodone, Tylenol with minimal benefit.  Denies any bowel or bladder incontinence, saddle anesthesia.     Reviewed and interpreted lumbar and hip XRs. This shows multilevel extensive facet arthropathy in the lumbar spine. Hip/pelvis x-rays show stable right total hip arthroplasty.     Patient reports a history of bilateral laminectomies at L3-4 and L4-5 in 2016.  Subsequently he did have significant benefit with right L3 and L4 transforaminal LEANNE's at [facility] around 2020.      After R SIJ injection on 10/5/23, he noticed significant improvement of his lower lumbar pain but continues to have more lateral pain. He did have significant tenderness overlying the greater trochanter.     After repeat R hip bursa injection, he notes no improvement. His pain is isolated to the R buttock region at this time.    Recent TPI in the gluteus medius muscle has provided 50% ongoing relief. He still has pain that stems from the gluteus medius down the right leg.      Plan:    Discussed and offered repeat US guided trigger point injection for his focal myofascial symptoms in the R gluteal region.    Recommend to obtain updated lumbar MRI given worsening of right radiating leg pains with numbness and pins/needles and history of lumbar spinal surgery. He has not yet obtained this. Pending MRI results may  discuss LEANNE.    Reviewed pertinent laboratory studies, specifically renal function, hemoglobin A1c, CBC, coagulation studies, prior to recommending medication therapies/interventional treatment options.    My impressions and treatment recommendations were discussed in detail with the patient who verbalized understanding and had no further questions.  Discharge instructions were provided. I personally saw and examined the patient and I agree with the above discussed plan of care.    Orders Placed This Encounter   Procedures    US msk guidance     Right gluteus medius TPI with US guidance (for SPA)     Standing Status:   Future     Expected Date:   4/11/2025     Expiration Date:   4/11/2029     Indicate laterality/region of interest (please specify in COMMENTS):   Right     No orders of the defined types were placed in this encounter.      History of Present Illness:  Lyndon Chairez is a 84 y.o. male who presents for a follow up office visit in regards to Back Pain.   The patient’s current symptoms include improved right sided buttock and leg pain symptoms.  Pain is rated 6 out of 10 described as a constant sharp, throbbing pain with numbness and pins/needles throughout the entire day.    I have personally reviewed and/or updated the patient's past medical history, past surgical history, family history, social history, current medications, allergies, and vital signs today.     Review of Systems   Constitutional:  Negative for chills and fever.   HENT:  Negative for ear pain and sore throat.    Eyes:  Negative for pain and visual disturbance.   Respiratory:  Negative for cough and shortness of breath.    Cardiovascular:  Negative for chest pain and palpitations.   Gastrointestinal:  Negative for abdominal pain and vomiting.   Genitourinary:  Negative for dysuria and hematuria.   Musculoskeletal:  Positive for arthralgias, back pain, gait problem and myalgias.   Skin:  Negative for color change and rash.    Neurological:  Positive for weakness. Negative for seizures and syncope.   All other systems reviewed and are negative.      Patient Active Problem List   Diagnosis    Obesity, morbid (HCC)    Atypical chest pain    Dysphagia    Sick sinus syndrome (HCC)    Paroxysmal atrial fibrillation (HCC)    BPH (benign prostatic hyperplasia)    Type 2 diabetes mellitus (HCC)    COVID-19    Lung nodules    Excessive daytime sleepiness    Sacroiliitis (HCC)    Greater trochanteric bursitis of right hip    Aortic stenosis    HTN (hypertension)    NSVT (nonsustained ventricular tachycardia) (HCC)    Dyslipidemia    Coronary artery calcification    JAMES (obstructive sleep apnea)    Thoracic aortic aneurysm without rupture (HCC)    CKD (chronic kidney disease) stage 2, GFR 60-89 ml/min    Failed total knee arthroplasty  (HCC)       Past Medical History:   Diagnosis Date    Diabetes (HCC)     Essential tremor     Hyperlipidemia     Hypertension     Obstructive sleep apnea     Paroxysmal atrial fibrillation (HCC)     Sick sinus syndrome (HCC)        Past Surgical History:   Procedure Laterality Date    CARDIAC ELECTROPHYSIOLOGY PROCEDURE N/A 2024    Procedure: Cardiac pacer generator change DC PM GEN CHANGE;  Surgeon: Wilton Esquivel DO;  [facility];  Service: Cardiology    CARDIAC ELECTROPHYSIOLOGY PROCEDURE N/A 2024    Procedure: Cardiac pocket revision;  Surgeon: Wilton Esquivel DO;  [facility];  Service: Cardiology    CT NEEDLE BIOPSY LUNG  2021    CT NEEDLE BIOPSY LUNG  2021    FL GUIDED NEEDLE PLAC BX/ASP/INJ  3/11/2024       No family history on file.    Social History     Occupational History    Not on file   Tobacco Use    Smoking status: Former     Current packs/day: 0.00     Average packs/day: 2.0 packs/day for 7.0 years (14.0 ttl pk-yrs)     Types: Cigarettes     Start date:      Quit date: 1965     Years since quittin.3    Smokeless tobacco: Never   Vaping Use     Vaping status: Never Used   Substance and Sexual Activity    Alcohol use: Not Currently    Drug use: Never    Sexual activity: Not on file       Current Outpatient Medications on File Prior to Visit   Medication Sig    acetaminophen (TYLENOL) 500 mg tablet Take 500 mg by mouth every 6 (six) hours as needed    albuterol (PROVENTIL HFA,VENTOLIN HFA) 90 mcg/act inhaler Inhale 2 puffs every 6 (six) hours as needed    allopurinol (ZYLOPRIM) 100 mg tablet Take 100 mg by mouth daily    Ascorbic Acid (VITAMIN C PO) Take 2 tablets by mouth every morning    azelastine (ASTELIN) 0.1 % nasal spray 1 spray into each nostril 2 (two) times a day Use in each nostril as directed    bisacodyl (DULCOLAX) 5 mg EC tablet Take 1 tablet (5 mg total) by mouth daily as needed for constipation    CHOLECALCIFEROL PO Take 2 tablets by mouth every morning    colchicine (COLCRYS) 0.6 mg tablet Take 0.6 mg by mouth 2 (two) times a day    cyanocobalamin (VITAMIN B-12) 1000 MCG tablet Take 1,000 mcg by mouth    dabigatran etexilate (PRADAXA) 150 mg capsu Take 1 capsule (150 mg total) by mouth every 12 (twelve) hours    ezetimibe (ZETIA) 10 mg tablet Take 10 mg by mouth daily    FERROUS FUMARATE PO Take 1 tablet by mouth every morning    furosemide (LASIX) 20 mg tablet Take 20 mg by mouth daily    gabapentin (Neurontin) 300 mg capsule Take 1 capsule (300 mg total) by mouth 3 (three) times a day    insulin degludec (TRESIBA) 100 units/mL injection pen Inject 80 Units under the skin    ipratropium (ATROVENT) 0.03 % nasal spray 2 sprays into each nostril every 12 (twelve) hours    levothyroxine 112 mcg tablet     lisinopril (ZESTRIL) 5 mg tablet     loratadine (CLARITIN) 10 mg tablet Take 10 mg by mouth daily    metoprolol succinate (TOPROL-XL) 50 mg 24 hr tablet Take 1 tablet (50 mg total) by mouth daily Do not start before October 31, 2022.    oxyCODONE (ROXICODONE) 15 mg immediate release tablet Take 15 mg by mouth every 4 (four) hours as needed  "for moderate pain    pantoprazole (PROTONIX) 40 mg tablet Take 1 tablet (40 mg total) by mouth daily    polyethylene glycol (MIRALAX) 17 g packet Take 17 g by mouth daily    tamsulosin (FLOMAX) 0.4 mg Take 1 capsule (0.4 mg total) by mouth daily with dinner     No current facility-administered medications on file prior to visit.       Allergies   Allergen Reactions    Aspirin GI Bleeding and Other (See Comments)    Sitagliptin Hives and Rash    Canagliflozin Rash and Hives     Burning with urination and kidneys started to fail       Physical Exam:    Ht 5' 9\" (1.753 m)   Wt 129 kg (284 lb)   BMI 41.94 kg/m²     Constitutional:normal, well developed, well nourished, alert, in no distress and non-toxic and no overt pain behavior.  Eyes:anicteric  HEENT:grossly intact  Neck:supple, symmetric, trachea midline and no masses   Pulmonary:even and unlabored  Cardiovascular:No edema or pitting edema present  Skin:Normal without rashes or lesions and well hydrated  Psychiatric:Mood and affect appropriate  Neurologic: Motor function is grossly intact with no focal neurologic deficits   Musculoskeletal: Slow gait favoring left lower extremity.  Use of walker for ambulation at baseline.  Obese.    Imaging    "

## 2025-04-18 ENCOUNTER — PROCEDURE VISIT (OUTPATIENT)
Dept: PAIN MEDICINE | Facility: CLINIC | Age: 85
End: 2025-04-18
Payer: MEDICARE

## 2025-04-18 DIAGNOSIS — M79.18 MYOFASCIAL PAIN SYNDROME: Primary | ICD-10-CM

## 2025-04-18 PROCEDURE — 20552 NJX 1/MLT TRIGGER POINT 1/2: CPT | Performed by: ANESTHESIOLOGY

## 2025-04-18 PROCEDURE — 76942 ECHO GUIDE FOR BIOPSY: CPT | Performed by: ANESTHESIOLOGY

## 2025-04-18 RX ORDER — BUPIVACAINE HYDROCHLORIDE 5 MG/ML
4 INJECTION, SOLUTION PERINEURAL ONCE
Status: COMPLETED | OUTPATIENT
Start: 2025-04-18 | End: 2025-04-18

## 2025-04-18 RX ORDER — TRIAMCINOLONE ACETONIDE 40 MG/ML
40 INJECTION, SUSPENSION INTRA-ARTICULAR; INTRAMUSCULAR ONCE
Status: COMPLETED | OUTPATIENT
Start: 2025-04-18 | End: 2025-04-18

## 2025-04-18 RX ADMIN — TRIAMCINOLONE ACETONIDE 40 MG: 40 INJECTION, SUSPENSION INTRA-ARTICULAR; INTRAMUSCULAR at 11:21

## 2025-04-18 RX ADMIN — BUPIVACAINE HYDROCHLORIDE 4 ML: 5 INJECTION, SOLUTION PERINEURAL at 11:20

## 2025-04-18 NOTE — PROGRESS NOTES
Procedure Note  Indication: buttock pain, right  Preoperative diagnosis: Myofascial pain syndrome  Postoperative diagnosis: Same    Procedure: Ultrasound guided trigger point injection    Muscles targeted: right lateral gluteus maximum (1 site)  Medications: 5mL used of 10mL mixture containing 40mg Kenalog with 4mL 0.5% bupivacaine     After discussing the risks, benefits, and alternatives to the procedure, the patient expressed understanding and wished to proceed. The patient was placed in sitting position. A procedural pause was conducted to verify: Correct patient identity, procedure to be performed and as applicable, correct side and site, correct patient position, and availability of implants, special equipment or special requirements.    Following this, the area was prepped with Chloraprep. A 3.5 inch 25 gauge needle was advanced into the identified trigger points with live ultrasound guidance using a curvilinear ultrasound probe. After negative aspiration of blood, air, or bodily fluids; the above injectate was injected into each trigger point.    The patient tolerated the procedure well and there were no apparent complications. After an appropriate amount of observation, the patient was dismissed from the office in good condition under their own power.

## 2025-05-02 ENCOUNTER — TELEPHONE (OUTPATIENT)
Dept: PAIN MEDICINE | Facility: CLINIC | Age: 85
End: 2025-05-02

## 2025-05-02 NOTE — TELEPHONE ENCOUNTER
S/W pt.  Advised pt of the same.  Pt stated [facility] needs to schedule the MRI and he told them about the MRI.  Pt verbalized understanding.

## 2025-05-02 NOTE — TELEPHONE ENCOUNTER
Patient has a patient pacemaker and when he was in the office last I reached out to central scheduling and they said they have been trying to contact him to schedule the MRI. I am unsure if they are calling the patient or the nursing home to schedule. I will try reaching out to central scheduling again and request they call the nursing home.

## 2025-05-23 ENCOUNTER — OFFICE VISIT (OUTPATIENT)
Dept: PAIN MEDICINE | Facility: CLINIC | Age: 85
End: 2025-05-23

## 2025-05-23 VITALS — BODY MASS INDEX: 42.06 KG/M2 | HEIGHT: 69 IN | WEIGHT: 284 LBS

## 2025-05-23 DIAGNOSIS — M54.16 LUMBAR RADICULOPATHY: Primary | ICD-10-CM

## 2025-05-23 DIAGNOSIS — M79.18 MYOFASCIAL PAIN SYNDROME: ICD-10-CM

## 2025-05-23 DIAGNOSIS — M47.816 LUMBAR SPONDYLOSIS: ICD-10-CM

## 2025-05-23 NOTE — PROGRESS NOTES
Name: Lyndon Chairez      : 1940      MRN: [id number]  Encounter Provider: Frederick Slater MD  Encounter Date: 2025   Encounter department: [facility] [place]  :  Assessment & Plan  Lumbar radiculopathy    Orders:    MRI lumbar spine wo contrast; Future    Lumbar spondylosis         Myofascial pain syndrome         Discussed and offered repeat US guided trigger point injection for his focal myofascial symptoms in the R gluteal region as needed.     Recommend to obtain updated lumbar MRI given worsening of right radiating leg pains with numbness and pins/needles and history of lumbar spinal surgery. He has not yet obtained this. Pending MRI results may discuss LEANNE.     Reviewed pertinent laboratory studies, specifically renal function, hemoglobin A1c, CBC, coagulation studies, prior to recommending medication therapies/interventional treatment options.     My impressions and treatment recommendations were discussed in detail with the patient who verbalized understanding and had no further questions.  Discharge instructions were provided. I personally saw and examined the patient and I agree with the above discussed plan of care.    History of Present Illness     Lyndon Chairez is a 85 y.o. male who presents for a follow up office visit in regards to Back Pain. The patient’s current symptoms include improved back pain symptoms. Pain is rated 6/10 and described as a constant throbbing pain with pins/needles in the legs.    He has a history of chronic right > left sided back/hip pain radiating to the buttock, lateral hip and posterior thigh for >1 year with waxing and waning significant pain episodes.     Pain is multifactorial with components of lumbar radiculopathy, sacroiliac joint dysfunction, greater trochanteric bursitis, myofascial pain and accompanied by pain at times >7/10 on the pain scale with inability to participate in IADLs for >6 weeks. Patient has fully participated with  "physical therapy in the past with no benefit.  Has been taking oxycodone, Tylenol with minimal benefit.  Denies any bowel or bladder incontinence, saddle anesthesia.      Patient reports a history of bilateral laminectomies at L3-4 and L4-5 in 2016.  Subsequently he did have significant benefit with right L3 and L4 transforaminal LEANNE's at [facility] around 2020.      After R SIJ injection on 10/5/23, he noticed significant improvement of his lower lumbar pain but continues to have more lateral pain. He did have significant tenderness overlying the greater trochanter.     After repeat R hip bursa injection, he notes no improvement. His pain is isolated to the R buttock region at this time.     Recent TPI in the gluteus medius muscle has provided 50% ongoing relief. He still has pain that stems from the gluteus medius down the right leg.    Review of Systems   Constitutional:  Negative for chills and fever.   HENT:  Negative for ear pain and sore throat.    Eyes:  Negative for pain and visual disturbance.   Respiratory:  Negative for cough and shortness of breath.    Cardiovascular:  Negative for chest pain and palpitations.   Gastrointestinal:  Negative for abdominal pain and vomiting.   Genitourinary:  Negative for dysuria and hematuria.   Musculoskeletal:  Positive for arthralgias, back pain, gait problem and myalgias.   Skin:  Negative for color change and rash.   Neurological:  Positive for weakness. Negative for seizures and syncope.   All other systems reviewed and are negative.      Medical History Reviewed by provider this encounter:     .  Medications Ordered Prior to Encounter[1]      Objective   Ht 5' 9\" (1.753 m)   Wt 129 kg (284 lb)   BMI 41.94 kg/m²      Pain Score:   6  Physical Exam  Constitutional:normal, well developed, well nourished, alert, in no distress and non-toxic and no overt pain behavior.  Eyes:anicteric  HEENT:grossly intact  Neck:supple, symmetric, trachea midline and no masses "   Pulmonary:even and unlabored  Cardiovascular:No edema or pitting edema present  Skin:Normal without rashes or lesions and well hydrated  Psychiatric:Mood and affect appropriate  Neurologic: Motor function is grossly intact with no focal neurologic deficits   Musculoskeletal: Slow gait favoring left lower extremity.  Use of walker for ambulation at baseline.  Obese.    Radiology Results Review: I personally reviewed the following image studies in PACS and associated radiology reports: xray(s). My interpretation of the radiology images/reports is: Reviewed and interpreted lumbar and hip XRs. This shows multilevel extensive facet arthropathy in the lumbar spine. Hip/pelvis x-rays show stable right total hip arthroplasty..         [1]   Current Outpatient Medications on File Prior to Visit   Medication Sig Dispense Refill    acetaminophen (TYLENOL) 500 mg tablet Take 500 mg by mouth every 6 (six) hours as needed      albuterol (PROVENTIL HFA,VENTOLIN HFA) 90 mcg/act inhaler Inhale 2 puffs every 6 (six) hours as needed      allopurinol (ZYLOPRIM) 100 mg tablet Take 100 mg by mouth daily      Ascorbic Acid (VITAMIN C PO) Take 2 tablets by mouth every morning      azelastine (ASTELIN) 0.1 % nasal spray 1 spray into each nostril 2 (two) times a day Use in each nostril as directed 5 mL 3    bisacodyl (DULCOLAX) 5 mg EC tablet Take 1 tablet (5 mg total) by mouth daily as needed for constipation 60 tablet 5    CHOLECALCIFEROL PO Take 2 tablets by mouth every morning      colchicine (COLCRYS) 0.6 mg tablet Take 0.6 mg by mouth 2 (two) times a day      cyanocobalamin (VITAMIN B-12) 1000 MCG tablet Take 1,000 mcg by mouth      dabigatran etexilate (PRADAXA) 150 mg capsu Take 1 capsule (150 mg total) by mouth every 12 (twelve) hours  0    ezetimibe (ZETIA) 10 mg tablet Take 10 mg by mouth daily      FERROUS FUMARATE PO Take 1 tablet by mouth every morning      furosemide (LASIX) 20 mg tablet Take 20 mg by mouth daily       gabapentin (Neurontin) 300 mg capsule Take 1 capsule (300 mg total) by mouth 3 (three) times a day 90 capsule 0    insulin degludec (TRESIBA) 100 units/mL injection pen Inject 80 Units under the skin      ipratropium (ATROVENT) 0.03 % nasal spray 2 sprays into each nostril every 12 (twelve) hours 10 mL 1    levothyroxine 112 mcg tablet       lisinopril (ZESTRIL) 5 mg tablet       loratadine (CLARITIN) 10 mg tablet Take 10 mg by mouth daily      metoprolol succinate (TOPROL-XL) 50 mg 24 hr tablet Take 1 tablet (50 mg total) by mouth daily Do not start before October 31, 2022.  0    oxyCODONE (ROXICODONE) 15 mg immediate release tablet Take 15 mg by mouth every 4 (four) hours as needed for moderate pain      pantoprazole (PROTONIX) 40 mg tablet Take 1 tablet (40 mg total) by mouth daily 30 tablet 2    polyethylene glycol (MIRALAX) 17 g packet Take 17 g by mouth daily 1700 g 3    tamsulosin (FLOMAX) 0.4 mg Take 1 capsule (0.4 mg total) by mouth daily with dinner  0     No current facility-administered medications on file prior to visit.

## 2025-06-11 ENCOUNTER — RESULTS FOLLOW-UP (OUTPATIENT)
Dept: NON INVASIVE DIAGNOSTICS | Facility: HOSPITAL | Age: 85
End: 2025-06-11

## 2025-06-11 ENCOUNTER — REMOTE DEVICE CLINIC VISIT (OUTPATIENT)
Dept: CARDIOLOGY CLINIC | Facility: CLINIC | Age: 85
End: 2025-06-11
Payer: MEDICARE

## 2025-06-11 DIAGNOSIS — Z95.0 PRESENCE OF CARDIAC PACEMAKER: Primary | ICD-10-CM

## 2025-06-11 PROCEDURE — 93294 REM INTERROG EVL PM/LDLS PM: CPT | Performed by: STUDENT IN AN ORGANIZED HEALTH CARE EDUCATION/TRAINING PROGRAM

## 2025-06-11 PROCEDURE — 93296 REM INTERROG EVL PM/IDS: CPT | Performed by: STUDENT IN AN ORGANIZED HEALTH CARE EDUCATION/TRAINING PROGRAM

## 2025-06-11 NOTE — PROGRESS NOTES
MDT DC PPM (MVP ON) - ACTIVE SYSTEM IS MRI CONDITIONAL   CARELINK TRANSMISSION: BATTERY VOLTAGE ADEQUATE (13.2 YR). AP 43.3%  21.6% (AAIR-DDDR 60 PPM, -280MS). ALL AVAILABLE LEAD PARAMETERS WITHIN NORMAL LIMITS. SINCE 3/11/25: NO SIGNIFICANT HIGH RATE EPISODES. AT/AF BURDEN <0.1%. PVC SINGLE COUNT 11.4/HR : RUNS 0.2/HR. EF 60% (12/15/2023 ECHO). PATIENT ON PRADAXA, METOPROLOL SUCC. NORMAL DEVICE FUNCTION.  ES

## 2025-06-12 ENCOUNTER — OFFICE VISIT (OUTPATIENT)
Dept: OTOLARYNGOLOGY | Facility: CLINIC | Age: 85
End: 2025-06-12
Payer: MEDICARE

## 2025-06-12 DIAGNOSIS — H69.92 ETD (EUSTACHIAN TUBE DYSFUNCTION), LEFT: ICD-10-CM

## 2025-06-12 DIAGNOSIS — H90.6 MIXED CONDUCTIVE AND SENSORINEURAL HEARING LOSS OF BOTH EARS: Primary | ICD-10-CM

## 2025-06-12 DIAGNOSIS — H72.91 PERFORATION OF RIGHT TYMPANIC MEMBRANE: ICD-10-CM

## 2025-06-12 PROCEDURE — 99213 OFFICE O/P EST LOW 20 MIN: CPT | Performed by: OTOLARYNGOLOGY

## 2025-06-12 NOTE — PROGRESS NOTES
[facility]  [facility]  [facility] Otolaryngology      Otolaryngology --Follow up      Assessment:   1. Mixed conductive and sensorineural hearing loss of both ears        2. Perforation of right tympanic membrane        3. ETD (Eustachian tube dysfunction), left            Orders  No orders of the defined types were placed in this encounter.        Discussion/Plan:    1. Right TM perf. Dry ear precautions. Unable to see more recent audiogram. The left TM is retracted. He would like to consider left myringotomy with PE tube placement. Discussed risk of perforation and that he has prior surgery on that side. He would like proceed with left PE tube. Will have him follow up in the [place] office.         Lyndon Chairez is a 85 y.o. who presents with a chief complaint of hearing    HPI:  Lyndon Chairez presents to the office with concerns of hearing. Last seen by Hector. Hx right TM perf and left tympanoplasty in the past. He uses hearing aids. He needs to use the valsalva maneuver to help with his left ear hearing.       Allergies   Allergen Reactions    Aspirin GI Bleeding and Other (See Comments)    Sitagliptin Hives and Rash    Canagliflozin Rash and Hives     Burning with urination and kidneys started to fail     Past Medical History[1]  Past Surgical History[2]  Family History[3]  Medications Ordered Prior to Encounter[4]        Results reviewed; images from any scan have been personally reviewed:    Audio 2023  Prior notes    Physical exam:    There were no vitals taken for this visit.    Physical Exam  Vitals and nursing note reviewed.   Constitutional:       General: He is not in acute distress.     Appearance: He is well-developed.   HENT:      Head: Normocephalic and atraumatic.      Right Ear: Ear canal and external ear normal. There is no impacted cerumen.      Left Ear: Ear canal and external ear normal. There is no impacted cerumen.      Ears:      Comments: Right TM  perf, Left TM retracted.     Nose: Nose normal. No congestion.      Mouth/Throat:      Mouth: Mucous membranes are moist.      Pharynx: Oropharynx is clear. No oropharyngeal exudate.     Eyes:      General:         Right eye: No discharge.         Left eye: No discharge.      Extraocular Movements: Extraocular movements intact.      Conjunctiva/sclera: Conjunctivae normal.      Pupils: Pupils are equal, round, and reactive to light.     Pulmonary:      Effort: Pulmonary effort is normal. No respiratory distress.     Musculoskeletal:      Cervical back: Neck supple.     Skin:     General: Skin is warm and dry.     Neurological:      Mental Status: He is alert.     Psychiatric:         Mood and Affect: Mood normal.         Procedures        Dictation software was used to dictate this note. It may contain errors with dictating incorrect words/spelling. Please contact provider directly for any questions.     Thank you for allowing me to participate in the care of your patient.         [1]   Past Medical History:  Diagnosis Date    Diabetes (HCC)     Essential tremor     Hyperlipidemia     Hypertension     Obstructive sleep apnea     Paroxysmal atrial fibrillation (HCC)     Sick sinus syndrome (HCC)    [2]   Past Surgical History:  Procedure Laterality Date    CARDIAC ELECTROPHYSIOLOGY PROCEDURE N/A 8/26/2024    Procedure: Cardiac pacer generator change DC PM GEN CHANGE;  Surgeon: Wilton Esquivel DO;  Location: [facility];  Service: Cardiology    CARDIAC ELECTROPHYSIOLOGY PROCEDURE N/A 8/26/2024    Procedure: Cardiac pocket revision;  Surgeon: Wilton Esquivel DO;  Location: [facility];  Service: Cardiology    CT NEEDLE BIOPSY LUNG  2/4/2021    CT NEEDLE BIOPSY LUNG  2/4/2021    FL GUIDED NEEDLE PLAC BX/ASP/INJ  3/11/2024   [3] No family history on file.  [4]   Current Outpatient Medications on File Prior to Visit   Medication Sig Dispense Refill    acetaminophen (TYLENOL) 500 mg tablet Take 500 mg by mouth  every 6 (six) hours as needed      albuterol (PROVENTIL HFA,VENTOLIN HFA) 90 mcg/act inhaler Inhale 2 puffs every 6 (six) hours as needed      allopurinol (ZYLOPRIM) 100 mg tablet Take 100 mg by mouth daily      Ascorbic Acid (VITAMIN C PO) Take 2 tablets by mouth every morning      azelastine (ASTELIN) 0.1 % nasal spray 1 spray into each nostril 2 (two) times a day Use in each nostril as directed 5 mL 3    bisacodyl (DULCOLAX) 5 mg EC tablet Take 1 tablet (5 mg total) by mouth daily as needed for constipation 60 tablet 5    CHOLECALCIFEROL PO Take 2 tablets by mouth every morning      colchicine (COLCRYS) 0.6 mg tablet Take 0.6 mg by mouth 2 (two) times a day      cyanocobalamin (VITAMIN B-12) 1000 MCG tablet Take 1,000 mcg by mouth      dabigatran etexilate (PRADAXA) 150 mg capsu Take 1 capsule (150 mg total) by mouth every 12 (twelve) hours  0    ezetimibe (ZETIA) 10 mg tablet Take 10 mg by mouth daily      FERROUS FUMARATE PO Take 1 tablet by mouth every morning      furosemide (LASIX) 20 mg tablet Take 20 mg by mouth daily      gabapentin (Neurontin) 300 mg capsule Take 1 capsule (300 mg total) by mouth 3 (three) times a day 90 capsule 0    insulin degludec (TRESIBA) 100 units/mL injection pen Inject 80 Units under the skin      ipratropium (ATROVENT) 0.03 % nasal spray 2 sprays into each nostril every 12 (twelve) hours 10 mL 1    levothyroxine 112 mcg tablet       lisinopril (ZESTRIL) 5 mg tablet       loratadine (CLARITIN) 10 mg tablet Take 10 mg by mouth daily      metoprolol succinate (TOPROL-XL) 50 mg 24 hr tablet Take 1 tablet (50 mg total) by mouth daily Do not start before October 31, 2022.  0    oxyCODONE (ROXICODONE) 15 mg immediate release tablet Take 15 mg by mouth every 4 (four) hours as needed for moderate pain      pantoprazole (PROTONIX) 40 mg tablet Take 1 tablet (40 mg total) by mouth daily 30 tablet 2    polyethylene glycol (MIRALAX) 17 g packet Take 17 g by mouth daily 1700 g 3    tamsulosin  (FLOMAX) 0.4 mg Take 1 capsule (0.4 mg total) by mouth daily with dinner  0     No current facility-administered medications on file prior to visit.

## 2025-06-19 ENCOUNTER — HOSPITAL ENCOUNTER (OUTPATIENT)
Dept: RADIOLOGY | Facility: IMAGING CENTER | Age: 85
Discharge: HOME | End: 2025-06-19

## 2025-06-19 DIAGNOSIS — Z95.0 PACEMAKER: ICD-10-CM

## 2025-07-05 ENCOUNTER — APPOINTMENT (EMERGENCY)
Dept: RADIOLOGY | Facility: HOSPITAL | Age: 85
DRG: 552 | End: 2025-07-05
Payer: MEDICARE

## 2025-07-05 ENCOUNTER — HOSPITAL ENCOUNTER (INPATIENT)
Facility: HOSPITAL | Age: 85
LOS: 1 days | Discharge: HOME | DRG: 552 | End: 2025-07-07
Attending: EMERGENCY MEDICINE | Admitting: FAMILY MEDICINE
Payer: MEDICARE

## 2025-07-05 DIAGNOSIS — M54.50 ACUTE LEFT-SIDED LOW BACK PAIN WITHOUT SCIATICA: ICD-10-CM

## 2025-07-05 DIAGNOSIS — R26.2 AMBULATORY DYSFUNCTION: ICD-10-CM

## 2025-07-05 DIAGNOSIS — R10.9 LEFT FLANK PAIN: Primary | ICD-10-CM

## 2025-07-05 LAB
ALBUMIN SERPL BCG-MCNC: 3.8 G/DL (ref 3.5–5)
ALP SERPL-CCNC: 54 U/L (ref 34–104)
ALT SERPL W P-5'-P-CCNC: 12 U/L (ref 7–52)
ANION GAP SERPL CALCULATED.3IONS-SCNC: 9 MMOL/L (ref 4–13)
AST SERPL W P-5'-P-CCNC: 15 U/L (ref 13–39)
BACTERIA UR QL AUTO: ABNORMAL /HPF
BASOPHILS # BLD AUTO: 0.03 THOUSANDS/ÂΜL (ref 0–0.1)
BASOPHILS NFR BLD AUTO: 0 % (ref 0–1)
BILIRUB SERPL-MCNC: 0.34 MG/DL (ref 0.2–1)
BILIRUB UR QL STRIP: NEGATIVE
BUN SERPL-MCNC: 24 MG/DL (ref 5–25)
CALCIUM SERPL-MCNC: 8.7 MG/DL (ref 8.4–10.2)
CHLORIDE SERPL-SCNC: 104 MMOL/L (ref 96–108)
CLARITY UR: CLEAR
CO2 SERPL-SCNC: 24 MMOL/L (ref 21–32)
COLOR UR: YELLOW
CREAT SERPL-MCNC: 1.4 MG/DL (ref 0.6–1.3)
EOSINOPHIL # BLD AUTO: 0.23 THOUSAND/ÂΜL (ref 0–0.61)
EOSINOPHIL NFR BLD AUTO: 3 % (ref 0–6)
ERYTHROCYTE [DISTWIDTH] IN BLOOD BY AUTOMATED COUNT: 15.6 % (ref 11.6–15.1)
GFR SERPL CREATININE-BSD FRML MDRD: 46 ML/MIN/1.73SQ M
GLUCOSE SERPL-MCNC: 127 MG/DL (ref 65–140)
GLUCOSE SERPL-MCNC: 149 MG/DL (ref 65–140)
GLUCOSE UR STRIP-MCNC: NEGATIVE MG/DL
HCT VFR BLD AUTO: 41.1 % (ref 36.5–49.3)
HGB BLD-MCNC: 13.2 G/DL (ref 12–17)
HGB UR QL STRIP.AUTO: ABNORMAL
IMM GRANULOCYTES # BLD AUTO: 0.08 THOUSAND/UL (ref 0–0.2)
IMM GRANULOCYTES NFR BLD AUTO: 1 % (ref 0–2)
KETONES UR STRIP-MCNC: ABNORMAL MG/DL
LEUKOCYTE ESTERASE UR QL STRIP: ABNORMAL
LYMPHOCYTES # BLD AUTO: 1.83 THOUSANDS/ÂΜL (ref 0.6–4.47)
LYMPHOCYTES NFR BLD AUTO: 21 % (ref 14–44)
MCH RBC QN AUTO: 28.4 PG (ref 26.8–34.3)
MCHC RBC AUTO-ENTMCNC: 32.1 G/DL (ref 31.4–37.4)
MCV RBC AUTO: 88 FL (ref 82–98)
MONOCYTES # BLD AUTO: 0.63 THOUSAND/ÂΜL (ref 0.17–1.22)
MONOCYTES NFR BLD AUTO: 7 % (ref 4–12)
NEUTROPHILS # BLD AUTO: 5.81 THOUSANDS/ÂΜL (ref 1.85–7.62)
NEUTS SEG NFR BLD AUTO: 68 % (ref 43–75)
NITRITE UR QL STRIP: NEGATIVE
NON-SQ EPI CELLS URNS QL MICRO: ABNORMAL /HPF
NRBC BLD AUTO-RTO: 0 /100 WBCS
PH UR STRIP.AUTO: 5.5 [PH]
PLATELET # BLD AUTO: 238 THOUSANDS/UL (ref 149–390)
PMV BLD AUTO: 10.7 FL (ref 8.9–12.7)
POTASSIUM SERPL-SCNC: 4.3 MMOL/L (ref 3.5–5.3)
PROT SERPL-MCNC: 6.6 G/DL (ref 6.4–8.4)
PROT UR STRIP-MCNC: ABNORMAL MG/DL
RBC # BLD AUTO: 4.65 MILLION/UL (ref 3.88–5.62)
RBC #/AREA URNS AUTO: ABNORMAL /HPF
SODIUM SERPL-SCNC: 137 MMOL/L (ref 135–147)
SP GR UR STRIP.AUTO: 1.02 (ref 1–1.03)
UROBILINOGEN UR STRIP-ACNC: 2 MG/DL
WBC # BLD AUTO: 8.61 THOUSAND/UL (ref 4.31–10.16)
WBC #/AREA URNS AUTO: ABNORMAL /HPF

## 2025-07-05 PROCEDURE — 83036 HEMOGLOBIN GLYCOSYLATED A1C: CPT | Performed by: INTERNAL MEDICINE

## 2025-07-05 PROCEDURE — 99223 1ST HOSP IP/OBS HIGH 75: CPT | Performed by: INTERNAL MEDICINE

## 2025-07-05 PROCEDURE — 81001 URINALYSIS AUTO W/SCOPE: CPT

## 2025-07-05 PROCEDURE — 87086 URINE CULTURE/COLONY COUNT: CPT

## 2025-07-05 PROCEDURE — 96365 THER/PROPH/DIAG IV INF INIT: CPT

## 2025-07-05 PROCEDURE — 36415 COLL VENOUS BLD VENIPUNCTURE: CPT

## 2025-07-05 PROCEDURE — 80053 COMPREHEN METABOLIC PANEL: CPT

## 2025-07-05 PROCEDURE — 82948 REAGENT STRIP/BLOOD GLUCOSE: CPT

## 2025-07-05 PROCEDURE — 93005 ELECTROCARDIOGRAM TRACING: CPT

## 2025-07-05 PROCEDURE — 99284 EMERGENCY DEPT VISIT MOD MDM: CPT

## 2025-07-05 PROCEDURE — 85025 COMPLETE CBC W/AUTO DIFF WBC: CPT

## 2025-07-05 PROCEDURE — 74176 CT ABD & PELVIS W/O CONTRAST: CPT

## 2025-07-05 PROCEDURE — 99285 EMERGENCY DEPT VISIT HI MDM: CPT | Performed by: EMERGENCY MEDICINE

## 2025-07-05 RX ORDER — BISACODYL 5 MG/1
5 TABLET, DELAYED RELEASE ORAL DAILY PRN
Status: DISCONTINUED | OUTPATIENT
Start: 2025-07-05 | End: 2025-07-07 | Stop reason: HOSPADM

## 2025-07-05 RX ORDER — EZETIMIBE 10 MG/1
10 TABLET ORAL DAILY
Status: DISCONTINUED | OUTPATIENT
Start: 2025-07-06 | End: 2025-07-07 | Stop reason: HOSPADM

## 2025-07-05 RX ORDER — POLYETHYLENE GLYCOL 3350 17 G/17G
17 POWDER, FOR SOLUTION ORAL DAILY
Status: DISCONTINUED | OUTPATIENT
Start: 2025-07-06 | End: 2025-07-07 | Stop reason: HOSPADM

## 2025-07-05 RX ORDER — OXYCODONE HYDROCHLORIDE 5 MG/1
5 TABLET ORAL EVERY 4 HOURS PRN
Refills: 0 | Status: DISCONTINUED | OUTPATIENT
Start: 2025-07-05 | End: 2025-07-07 | Stop reason: HOSPADM

## 2025-07-05 RX ORDER — COLCHICINE 0.6 MG/1
0.6 TABLET ORAL 2 TIMES DAILY
Status: DISCONTINUED | OUTPATIENT
Start: 2025-07-05 | End: 2025-07-07 | Stop reason: HOSPADM

## 2025-07-05 RX ORDER — ACETAMINOPHEN 325 MG/1
975 TABLET ORAL EVERY 8 HOURS SCHEDULED
Status: DISCONTINUED | OUTPATIENT
Start: 2025-07-05 | End: 2025-07-07 | Stop reason: HOSPADM

## 2025-07-05 RX ORDER — OXYCODONE HYDROCHLORIDE 10 MG/1
10 TABLET ORAL EVERY 4 HOURS PRN
Refills: 0 | Status: DISCONTINUED | OUTPATIENT
Start: 2025-07-05 | End: 2025-07-07 | Stop reason: HOSPADM

## 2025-07-05 RX ORDER — LISINOPRIL 5 MG/1
5 TABLET ORAL DAILY
Status: DISCONTINUED | OUTPATIENT
Start: 2025-07-06 | End: 2025-07-07 | Stop reason: HOSPADM

## 2025-07-05 RX ORDER — LEVOTHYROXINE SODIUM 112 UG/1
112 TABLET ORAL
Status: DISCONTINUED | OUTPATIENT
Start: 2025-07-06 | End: 2025-07-07 | Stop reason: HOSPADM

## 2025-07-05 RX ORDER — ONDANSETRON 2 MG/ML
4 INJECTION INTRAMUSCULAR; INTRAVENOUS EVERY 6 HOURS PRN
Status: DISCONTINUED | OUTPATIENT
Start: 2025-07-05 | End: 2025-07-07 | Stop reason: HOSPADM

## 2025-07-05 RX ORDER — ALLOPURINOL 100 MG/1
100 TABLET ORAL DAILY
Status: DISCONTINUED | OUTPATIENT
Start: 2025-07-06 | End: 2025-07-07 | Stop reason: HOSPADM

## 2025-07-05 RX ORDER — SODIUM CHLORIDE, SODIUM GLUCONATE, SODIUM ACETATE, POTASSIUM CHLORIDE, MAGNESIUM CHLORIDE, SODIUM PHOSPHATE, DIBASIC, AND POTASSIUM PHOSPHATE .53; .5; .37; .037; .03; .012; .00082 G/100ML; G/100ML; G/100ML; G/100ML; G/100ML; G/100ML; G/100ML
500 INJECTION, SOLUTION INTRAVENOUS ONCE
Status: COMPLETED | OUTPATIENT
Start: 2025-07-05 | End: 2025-07-05

## 2025-07-05 RX ORDER — ALBUTEROL SULFATE 90 UG/1
2 INHALANT RESPIRATORY (INHALATION) EVERY 6 HOURS PRN
Status: DISCONTINUED | OUTPATIENT
Start: 2025-07-05 | End: 2025-07-07 | Stop reason: HOSPADM

## 2025-07-05 RX ORDER — DOCUSATE SODIUM 100 MG/1
100 CAPSULE, LIQUID FILLED ORAL 2 TIMES DAILY
Status: DISCONTINUED | OUTPATIENT
Start: 2025-07-05 | End: 2025-07-07 | Stop reason: HOSPADM

## 2025-07-05 RX ORDER — FUROSEMIDE 20 MG/1
20 TABLET ORAL DAILY
Status: DISCONTINUED | OUTPATIENT
Start: 2025-07-06 | End: 2025-07-07 | Stop reason: HOSPADM

## 2025-07-05 RX ORDER — PANTOPRAZOLE SODIUM 40 MG/1
40 TABLET, DELAYED RELEASE ORAL DAILY
Status: DISCONTINUED | OUTPATIENT
Start: 2025-07-06 | End: 2025-07-07 | Stop reason: HOSPADM

## 2025-07-05 RX ORDER — INSULIN GLARGINE 100 [IU]/ML
40 INJECTION, SOLUTION SUBCUTANEOUS
Status: DISCONTINUED | OUTPATIENT
Start: 2025-07-05 | End: 2025-07-07 | Stop reason: HOSPADM

## 2025-07-05 RX ORDER — LORATADINE 10 MG/1
10 TABLET ORAL DAILY
Status: DISCONTINUED | OUTPATIENT
Start: 2025-07-06 | End: 2025-07-07 | Stop reason: HOSPADM

## 2025-07-05 RX ORDER — METOPROLOL SUCCINATE 50 MG/1
50 TABLET, EXTENDED RELEASE ORAL DAILY
Status: DISCONTINUED | OUTPATIENT
Start: 2025-07-06 | End: 2025-07-07 | Stop reason: HOSPADM

## 2025-07-05 RX ORDER — TAMSULOSIN HYDROCHLORIDE 0.4 MG/1
0.4 CAPSULE ORAL
Status: DISCONTINUED | OUTPATIENT
Start: 2025-07-06 | End: 2025-07-07 | Stop reason: HOSPADM

## 2025-07-05 RX ORDER — GABAPENTIN 300 MG/1
300 CAPSULE ORAL 3 TIMES DAILY
Status: DISCONTINUED | OUTPATIENT
Start: 2025-07-05 | End: 2025-07-07 | Stop reason: HOSPADM

## 2025-07-05 RX ORDER — KETOROLAC TROMETHAMINE 30 MG/ML
1 INJECTION, SOLUTION INTRAMUSCULAR; INTRAVENOUS ONCE
Status: COMPLETED | OUTPATIENT
Start: 2025-07-05 | End: 2025-07-05

## 2025-07-05 RX ORDER — DABIGATRAN ETEXILATE 150 MG/1
150 CAPSULE ORAL EVERY 12 HOURS SCHEDULED
Status: DISCONTINUED | OUTPATIENT
Start: 2025-07-05 | End: 2025-07-07 | Stop reason: HOSPADM

## 2025-07-05 RX ORDER — METHOCARBAMOL 750 MG/1
750 TABLET, FILM COATED ORAL EVERY 8 HOURS SCHEDULED
Status: DISCONTINUED | OUTPATIENT
Start: 2025-07-05 | End: 2025-07-07 | Stop reason: HOSPADM

## 2025-07-05 RX ADMIN — METHOCARBAMOL 750 MG: 750 TABLET ORAL at 19:57

## 2025-07-05 RX ADMIN — INSULIN GLARGINE 40 UNITS: 100 INJECTION, SOLUTION SUBCUTANEOUS at 22:01

## 2025-07-05 RX ADMIN — COLCHICINE 0.6 MG: 0.6 TABLET ORAL at 19:57

## 2025-07-05 RX ADMIN — GABAPENTIN 300 MG: 300 CAPSULE ORAL at 20:40

## 2025-07-05 RX ADMIN — ACETAMINOPHEN 975 MG: 325 TABLET ORAL at 22:01

## 2025-07-05 RX ADMIN — OXYCODONE HYDROCHLORIDE 5 MG: 5 TABLET ORAL at 19:57

## 2025-07-05 RX ADMIN — DABIGATRAN ETEXILATE MESYLATE 150 MG: 150 CAPSULE ORAL at 22:01

## 2025-07-05 RX ADMIN — SODIUM CHLORIDE, SODIUM GLUCONATE, SODIUM ACETATE, POTASSIUM CHLORIDE, MAGNESIUM CHLORIDE, SODIUM PHOSPHATE, DIBASIC, AND POTASSIUM PHOSPHATE 500 ML: .53; .5; .37; .037; .03; .012; .00082 INJECTION, SOLUTION INTRAVENOUS at 18:39

## 2025-07-05 NOTE — Clinical Note
Case was discussed with ELBERT and the patient's admission status was agreed to be Admission Status: inpatient status to the service of

## 2025-07-05 NOTE — ASSESSMENT & PLAN NOTE
"Lab Results   Component Value Date    HGBA1C 8.6 (H) 10/13/2023       No results for input(s): \"POCGLU\" in the last 72 hours.    Blood Sugar Average: Last 72 hrs:  diabetic diet while inpatient  He is unsure of his insulin dose and pharmacy records are not available  Will start with lantus 40 units at HS  Add prandial insulin if needed  Sliding scale coverage for now.    "

## 2025-07-05 NOTE — ASSESSMENT & PLAN NOTE
Lab Results   Component Value Date    EGFR 46 07/05/2025    EGFR 49 08/26/2024    EGFR 53 08/23/2024    CREATININE 1.40 (H) 07/05/2025    CREATININE 1.33 (H) 08/26/2024    CREATININE 1.24 08/23/2024   Creatinine baseline is about 1.2-1.4  Currently at baseline  Pt received 30mg of toradol prior to arrival, further NSAIDs should be avoided  Monitor BMP for now

## 2025-07-05 NOTE — ASSESSMENT & PLAN NOTE
Suspect muscular strain/sprain as there is no radiation or weakness  Due to his inability to walk due to severe pain will place in observation and establish a pain regimen and monitor for effectiveness  Start tylenol 650mg q8h, robaxin 750mg q8h and oxycodone 5/10mg PRN  Avoid NSAIDs due to CKD  Can also consider corticosteroids but this will likely worsening his diabetes control  If he fails to respond can consider an MRI of the lumbar spine but this will have to wait anyway due to the presence of a pacemaker  PT/OT

## 2025-07-05 NOTE — H&P
"H&P - Hospitalist   Name: Lyndon Chairez 85 y.o. male I MRN: [id number]  Unit/Bed#: ED 01 I Date of Admission: 7/5/2025   Date of Service: 7/5/2025 I Hospital Day: 0     Assessment & Plan  Acute left-sided low back pain without sciatica  Suspect muscular strain/sprain as there is no radiation or weakness  Due to his inability to walk due to severe pain will place in observation and establish a pain regimen and monitor for effectiveness  Start tylenol 650mg q8h, robaxin 750mg q8h and oxycodone 5/10mg PRN  Avoid NSAIDs due to CKD  Can also consider corticosteroids but this will likely worsening his diabetes control  If he fails to respond can consider an MRI of the lumbar spine but this will have to wait anyway due to the presence of a pacemaker  PT/OT  Paroxysmal atrial fibrillation (HCC)  Rate controlled with metoprolol  Has a pacemaker  Continue pradaxa for AC  Type 2 diabetes mellitus (HCC)  Lab Results   Component Value Date    HGBA1C 8.6 (H) 10/13/2023       No results for input(s): \"POCGLU\" in the last 72 hours.    Blood Sugar Average: Last 72 hrs:  diabetic diet while inpatient  He is unsure of his insulin dose and pharmacy records are not available  Will start with lantus 40 units at HS  Add prandial insulin if needed  Sliding scale coverage for now.    Obesity, morbid (HCC)  Affects all aspects of his care and complicated mobility in the setting of severe low back pain  Outpatient follow up with PCP for weight loss counseling  Turn q2h  HTN (hypertension)  BP is acceptable  Continue metoprolol, lisinopril and furosemide  CKD (chronic kidney disease) stage 2, GFR 60-89 ml/min  Lab Results   Component Value Date    EGFR 46 07/05/2025    EGFR 49 08/26/2024    EGFR 53 08/23/2024    CREATININE 1.40 (H) 07/05/2025    CREATININE 1.33 (H) 08/26/2024    CREATININE 1.24 08/23/2024   Creatinine baseline is about 1.2-1.4  Currently at baseline  Pt received 30mg of toradol prior to arrival, further NSAIDs should be " avoided  Monitor BMP for now      VTE Pharmacologic Prophylaxis:   Moderate Risk (Score 3-4) - Pharmacological DVT Prophylaxis Ordered: dabigatran (Pradaxa).  Code Status: Level 1 - Full Code     Anticipated Length of Stay: Patient will be admitted on an observation basis with an anticipated length of stay of less than 2 midnights secondary to low back pain.    History of Present Illness   Chief Complaint: low back pain    Lyndon Chairez is a 85 y.o. male with a PMH of chronic pain, DM, HTN, A fib who presents with low back pain. He reports left sided back pain for about the last two weeks. Initially his PCP found a UTI and treated with antibiotics. Despite that his pain did not improve. Today while bending over to get the mail he felt a sudden worsening and could not get back up. EMS was called and he presented to the ED for evaluation. CT scan of the abdomen and pelvis was unremarkable.    Review of Systems   All other systems reviewed and are negative.      Historical Information   Past Medical History[1]  Past Surgical History[2]  Social History[3]  E-Cigarette/Vaping    E-Cigarette Use Never User      E-Cigarette/Vaping Substances     Family history non-contributory  Social History:  Marital Status: Unknown   Occupation: retired  Patient Pre-hospital Living Situation: Home  Patient Pre-hospital Level of Mobility: walks with walker  Patient Pre-hospital Diet Restrictions: None    Meds/Allergies   I have reviewed home medications using recent Epic encounter.  Prior to Admission medications    Medication Sig Start Date End Date Taking? Authorizing Provider   acetaminophen (TYLENOL) 500 mg tablet Take 500 mg by mouth every 6 (six) hours as needed    Historical Provider, MD   albuterol (PROVENTIL HFA,VENTOLIN HFA) 90 mcg/act inhaler Inhale 2 puffs every 6 (six) hours as needed    Historical Provider, MD   allopurinol (ZYLOPRIM) 100 mg tablet Take 100 mg by mouth daily    Historical Provider, MD   Ascorbic Acid  (VITAMIN C PO) Take 2 tablets by mouth every morning    Historical Provider, MD   azelastine (ASTELIN) 0.1 % nasal spray 1 spray into each nostril 2 (two) times a day Use in each nostril as directed 8/3/23 1/23/25  Maranda Hector MD   bisacodyl (DULCOLAX) 5 mg EC tablet Take 1 tablet (5 mg total) by mouth daily as needed for constipation 11/28/22   Nahed Romeo MD   CHOLECALCIFEROL PO Take 2 tablets by mouth every morning    Historical Provider, MD   colchicine (COLCRYS) 0.6 mg tablet Take 0.6 mg by mouth 2 (two) times a day    Historical Provider, MD   cyanocobalamin (VITAMIN B-12) 1000 MCG tablet Take 1,000 mcg by mouth    Historical Provider, MD   dabigatran etexilate (PRADAXA) 150 mg capsu Take 1 capsule (150 mg total) by mouth every 12 (twelve) hours 10/30/22   Yesenia Weber MD   ezetimibe (ZETIA) 10 mg tablet Take 10 mg by mouth daily 6/2/22 1/23/25  Historical Provider, MD   FERROUS FUMARATE PO Take 1 tablet by mouth every morning    Historical Provider, MD   furosemide (LASIX) 20 mg tablet Take 20 mg by mouth daily    Historical Provider, MD   gabapentin (Neurontin) 300 mg capsule Take 1 capsule (300 mg total) by mouth 3 (three) times a day 5/28/24   Jessy Mckoy PA-C   insulin degludec (TRESIBA) 100 units/mL injection pen Inject 80 Units under the skin    Historical Provider, MD   ipratropium (ATROVENT) 0.03 % nasal spray 2 sprays into each nostril every 12 (twelve) hours 1/20/23 1/23/25  Maranda Hector MD   levothyroxine 112 mcg tablet  9/19/22   Historical Provider, MD   lisinopril (ZESTRIL) 5 mg tablet  9/20/22   Historical Provider, MD   loratadine (CLARITIN) 10 mg tablet Take 10 mg by mouth daily    Historical Provider, MD   metoprolol succinate (TOPROL-XL) 50 mg 24 hr tablet Take 1 tablet (50 mg total) by mouth daily Do not start before October 31, 2022. 10/31/22   Yesenia Weber MD   oxyCODONE (ROXICODONE) 15 mg immediate release tablet Take 15 mg by mouth every 4 (four)  hours as needed for moderate pain    Historical Provider, MD   pantoprazole (PROTONIX) 40 mg tablet Take 1 tablet (40 mg total) by mouth daily 3/24/23   Zabrina Haywood PA-C   polyethylene glycol (MIRALAX) 17 g packet Take 17 g by mouth daily 11/28/22   Nahed Romeo MD   tamsulosin (FLOMAX) 0.4 mg Take 1 capsule (0.4 mg total) by mouth daily with dinner 10/30/22   Yesenia Weber MD     Allergies   Allergen Reactions    Aspirin GI Bleeding and Other (See Comments)    Sitagliptin Hives and Rash    Canagliflozin Rash and Hives     Burning with urination and kidneys started to fail       Objective :  Temp:  [98.1 °F (36.7 °C)] 98.1 °F (36.7 °C)  HR:  [61-73] 61  BP: (117-154)/(59-67) 144/65  Resp:  [18-26] 18  SpO2:  [92 %-97 %] 92 %  O2 Device: None (Room air)    Physical Exam  Constitutional:       Appearance: Normal appearance. He is obese.   HENT:      Head: Normocephalic and atraumatic.      Nose: Nose normal.     Eyes:      Extraocular Movements: Extraocular movements intact.       Cardiovascular:      Rate and Rhythm: Normal rate and regular rhythm.   Pulmonary:      Effort: Pulmonary effort is normal.      Breath sounds: No wheezing or rales.   Abdominal:      Palpations: Abdomen is soft.     Musculoskeletal:         General: Tenderness present. No swelling, deformity or signs of injury.      Right lower leg: Edema present.      Left lower leg: Edema present.     Skin:     General: Skin is warm and dry.     Neurological:      Mental Status: He is alert and oriented to person, place, and time.      Sensory: No sensory deficit.      Motor: No weakness.     Psychiatric:         Mood and Affect: Mood normal.         Behavior: Behavior normal.          Lines/Drains:            Lab Results: I have reviewed the following results:  Results from last 7 days   Lab Units 07/05/25  1607   WBC Thousand/uL 8.61   HEMOGLOBIN g/dL 13.2   HEMATOCRIT % 41.1   PLATELETS Thousands/uL 238   SEGS PCT % 68   LYMPHO PCT %  21   MONO PCT % 7   EOS PCT % 3     Results from last 7 days   Lab Units 07/05/25  1607   SODIUM mmol/L 137   POTASSIUM mmol/L 4.3   CHLORIDE mmol/L 104   CO2 mmol/L 24   BUN mg/dL 24   CREATININE mg/dL 1.40*   ANION GAP mmol/L 9   CALCIUM mg/dL 8.7   ALBUMIN g/dL 3.8   TOTAL BILIRUBIN mg/dL 0.34   ALK PHOS U/L 54   ALT U/L 12   AST U/L 15   GLUCOSE RANDOM mg/dL 149*             Lab Results   Component Value Date    HGBA1C 8.6 (H) 10/13/2023    HGBA1C 7.9 (H) 07/14/2023    HGBA1C 7.9 (H) 07/03/2023           Imaging Results Review: I personally reviewed the following image studies in PACS and associated radiology reports: CT abdomen/pelvis. My interpretation of the radiology images/reports is: No acute findings.  Other Study Results Review: EKG was reviewed.     Administrative Statements   I have spent a total time of 40 minutes in caring for this patient on the day of the visit/encounter including Diagnostic results, Instructions for management, Patient and family education, Impressions, Counseling / Coordination of care, Documenting in the medical record, Reviewing/placing orders in the medical record (including tests, medications, and/or procedures), Obtaining or reviewing history  , and Communicating with other healthcare professionals .    ** Please Note: This note has been constructed using a voice recognition system. **         [1]   Past Medical History:  Diagnosis Date    Diabetes (HCC)     Essential tremor     Hyperlipidemia     Hypertension     Obstructive sleep apnea     Paroxysmal atrial fibrillation (HCC)     Sick sinus syndrome (HCC)    [2]   Past Surgical History:  Procedure Laterality Date    CARDIAC ELECTROPHYSIOLOGY PROCEDURE N/A 8/26/2024    Procedure: Cardiac pacer generator change DC PM GEN CHANGE;  Surgeon: Wilton Esquivel DO;  Location: [facility];  Service: Cardiology    CARDIAC ELECTROPHYSIOLOGY PROCEDURE N/A 8/26/2024    Procedure: Cardiac pocket revision;  Surgeon: Wilton Esquivel  DO;  Location: [facility];  Service: Cardiology    CT NEEDLE BIOPSY LUNG  2021    CT NEEDLE BIOPSY LUNG  2021    FL GUIDED NEEDLE PLAC BX/ASP/INJ  3/11/2024   [3]   Social History  Tobacco Use    Smoking status: Former     Current packs/day: 0.00     Average packs/day: 2.0 packs/day for 7.0 years (14.0 ttl pk-yrs)     Types: Cigarettes     Start date:      Quit date:      Years since quittin.5    Smokeless tobacco: Never   Vaping Use    Vaping status: Never Used   Substance and Sexual Activity    Alcohol use: Not Currently    Drug use: Never

## 2025-07-05 NOTE — ASSESSMENT & PLAN NOTE
Affects all aspects of his care and complicated mobility in the setting of severe low back pain  Outpatient follow up with PCP for weight loss counseling  Turn q2h

## 2025-07-05 NOTE — ED PROVIDER NOTES
Time reflects when diagnosis was documented in both MDM as applicable and the Disposition within this note       Time User Action Codes Description Comment    7/5/2025  6:58 PM Luis Giordano [R10.9] Left flank pain     7/5/2025  6:59 PM Luis Giordano [R26.2] Ambulatory dysfunction           ED Disposition       ED Disposition   Admit    Condition   Stable    Date/Time   Sat Jul 5, 2025  7:16 PM    Comment   Case was discussed with SLIM and the patient's admission status was agreed to be Admission Status: observation status to the service of Dr. Gagnon                Assessment & Plan       Medical Decision Making  85-year-old male with past medical history sick sinus syndrome status post pacemaker, type 2 diabetes, hypertension, CKD, lumbar radiculopathy, myofascial pain syndrome presents ED for evaluation of left flank pain x 1 week, worsening.  On exam, patient is unable to walk more than 3 steps with a walker secondary to pain.  He has a left flank tenderness to palpation which is reproducible upon left straight leg raise.  No anterior abdominal tenderness.  Differential diagnosis: I suspect that this is musculoskeletal in etiology.  Considering nephrolithiasis, UTI, and pyelonephritis, however do not suspect due to lack of urinary complaints.  Do not suspect cauda equina.  Patient does have a history of lumbar radiculopathy, lumbar spondylosis, myofascial pain syndrome.  Plan: CBC, CMP, UA, CT abdomen pelvis without contrast.  Please see ED course for additional information.     Amount and/or Complexity of Data Reviewed  Labs: ordered. Decision-making details documented in ED Course.  Radiology: ordered. Decision-making details documented in ED Course.    Risk  Prescription drug management.  Decision regarding hospitalization.        ED Course as of 07/05/25 1927   Sat Jul 05, 2025   1637 WBC: 8.61  Within normal limits   1844 Patient is unable to ambulate with a walker independently secondary to  pain.  Will admit for ambulatory dysfunction.  Patient lives alone.   1845 CT abdomen pelvis wo contrast  No acute findings in the abdomen or pelvis within the limits of unenhanced technique to include small bowel obstruction, colitis, or acute appendicitis. No obstructive uropathy.       Medications   multi-electrolyte (Plasmalyte-A/Isolyte-S PH 7.4/Normosol-R) IV bolus 500 mL (500 mL Intravenous New Bag 7/5/25 1839)   ketorolac (FOR EMS ONLY) (TORADOL) injection 30 mg (0 mg Does not apply Given to EMS 7/5/25 1527)       ED Risk Strat Scores                    No data recorded                            History of Present Illness       Chief Complaint   Patient presents with    Flank Pain     Pt reports via EMS with left flank pain. Pt reports being tx for bladder infection with abx at this time.        Past Medical History[1]   Past Surgical History[2]   Family History[3]   Social History[4]   E-Cigarette/Vaping    E-Cigarette Use Never User       E-Cigarette/Vaping Substances      I have reviewed and agree with the history as documented.     85-year-old male with past medical history sick sinus syndrome status post pacemaker, type 2 diabetes, aortic stenosis, hypertension, CKD, presents ED for evaluation of left flank pain.  Patient states that this pain has been present for approximately 1 week.  It is worsened with movement.  It is not radiating.  Denies associated fever, chills, anterior abdominal pain, nausea, vomiting, diarrhea, urinary complaints.  Patient was evaluated for this by his PCP.  Urinalysis obtained at that visit showed evidence of UTI.  Patient states that he took 2 days of antibiotics, but he is unsure which one.  He states that he was not having urinary symptoms at that time. Patient has a past medical history of lumbar radiculopathy, lumbar spondylosis, myofascial pain syndrome.        Review of Systems   Constitutional:  Negative for chills and fever.   HENT:  Negative for congestion.     Respiratory:  Negative for shortness of breath.    Cardiovascular:  Negative for chest pain.   Gastrointestinal:  Negative for abdominal pain, diarrhea, nausea and vomiting.   Genitourinary:  Positive for flank pain. Negative for difficulty urinating, frequency, hematuria and penile discharge.   Skin:  Negative for color change.           Objective       ED Triage Vitals   Temperature Pulse Blood Pressure Respirations SpO2 Patient Position - Orthostatic VS   07/05/25 1526 07/05/25 1526 07/05/25 1545 07/05/25 1526 07/05/25 1526 07/05/25 1545   98.1 °F (36.7 °C) 73 154/67 20 97 % Sitting      Temp Source Heart Rate Source BP Location FiO2 (%) Pain Score    07/05/25 1526 07/05/25 1526 07/05/25 1545 -- 07/05/25 1526    Oral Monitor Left arm  10 - Worst Possible Pain      Vitals      Date and Time Temp Pulse SpO2 Resp BP Pain Score FACES Pain Rating User   07/05/25 1800 -- 61 92 % 18 144/65 -- -- BL   07/05/25 1700 -- 65 95 % 21 133/61 -- -- BL   07/05/25 1600 -- 65 93 % 21 117/59 -- -- BL   07/05/25 1545 -- 72 95 % 26 154/67 -- -- BL   07/05/25 1526 98.1 °F (36.7 °C) 73 97 % 20 -- 10 - Worst Possible Pain -- BL            Physical Exam  Vitals and nursing note reviewed.   Constitutional:       General: He is not in acute distress.     Appearance: He is obese. He is not ill-appearing, toxic-appearing or diaphoretic.   HENT:      Head: Normocephalic and atraumatic.      Mouth/Throat:      Mouth: Mucous membranes are moist.     Eyes:      Conjunctiva/sclera: Conjunctivae normal.       Cardiovascular:      Rate and Rhythm: Normal rate and regular rhythm.      Pulses: Normal pulses.      Heart sounds: Normal heart sounds.   Pulmonary:      Effort: Pulmonary effort is normal.      Breath sounds: Normal breath sounds.   Abdominal:      Palpations: Abdomen is soft.      Tenderness: There is no abdominal tenderness. There is left CVA tenderness.     Musculoskeletal:      Cervical back: Neck supple.      Comments: Left flank  pain reproduced with left leg raise.     Skin:     General: Skin is warm and dry.      Capillary Refill: Capillary refill takes less than 2 seconds.     Neurological:      Mental Status: He is alert and oriented to person, place, and time.         Results Reviewed       Procedure Component Value Units Date/Time    Comprehensive metabolic panel [[id number]]  (Abnormal) Collected: 07/05/25 1607    Lab Status: Final result Specimen: Blood from Arm, Left Updated: 07/05/25 1648     Sodium 137 mmol/L      Potassium 4.3 mmol/L      Chloride 104 mmol/L      CO2 24 mmol/L      ANION GAP 9 mmol/L      BUN 24 mg/dL      Creatinine 1.40 mg/dL      Glucose 149 mg/dL      Calcium 8.7 mg/dL      AST 15 U/L      ALT 12 U/L      Alkaline Phosphatase 54 U/L      Total Protein 6.6 g/dL      Albumin 3.8 g/dL      Total Bilirubin 0.34 mg/dL      eGFR 46 ml/min/1.73sq m     Narrative:      National Kid[employer] guidelines for Chronic Kidney Disease (CKD):     Stage 1 with normal or high GFR (GFR > 90 mL/min/1.73 square meters)    Stage 2 Mild CKD (GFR = 60-89 mL/min/1.73 square meters)    Stage 3A Moderate CKD (GFR = 45-59 mL/min/1.73 square meters)    Stage 3B Moderate CKD (GFR = 30-44 mL/min/1.73 square meters)    Stage 4 Severe CKD (GFR = 15-29 mL/min/1.73 square meters)    Stage 5 End Stage CKD (GFR <15 mL/min/1.73 square meters)  Note: GFR calculation is accurate only with a steady state creatinine    CBC and differential [[id number]]  (Abnormal) Collected: 07/05/25 1607    Lab Status: Final result Specimen: Blood from Arm, Left Updated: 07/05/25 1629     WBC 8.61 Thousand/uL      RBC 4.65 Million/uL      Hemoglobin 13.2 g/dL      Hematocrit 41.1 %      MCV 88 fL      MCH 28.4 pg      MCHC 32.1 g/dL      RDW 15.6 %      MPV 10.7 fL      Platelets 238 Thousands/uL      nRBC 0 /100 WBCs      Segmented % 68 %      Immature Grans % 1 %      Lymphocytes % 21 %      Monocytes % 7 %      Eosinophils Relative 3 %      Basophils  Relative 0 %      Absolute Neutrophils 5.81 Thousands/µL      Absolute Immature Grans 0.08 Thousand/uL      Absolute Lymphocytes 1.83 Thousands/µL      Absolute Monocytes 0.63 Thousand/µL      Eosinophils Absolute 0.23 Thousand/µL      Basophils Absolute 0.03 Thousands/µL     UA w Reflex to Microscopic w Reflex to Culture [[id number]]     Lab Status: No result Specimen: Urine             CT abdomen pelvis wo contrast   Final Interpretation by Delgado Blankenship DO ([id number])      No acute findings in the abdomen or pelvis within the limits of unenhanced technique to include small bowel obstruction, colitis, or acute appendicitis. No obstructive uropathy.      Other findings as detailed above.      Workstation performed: [id number]             Procedures    ED Medication and Procedure Management   Prior to Admission Medications   Prescriptions Last Dose Informant Patient Reported? Taking?   Ascorbic Acid (VITAMIN C PO)  Self Yes No   Sig: Take 2 tablets by mouth every morning   CHOLECALCIFEROL PO  Self Yes No   Sig: Take 2 tablets by mouth every morning   FERROUS FUMARATE PO  Self Yes No   Sig: Take 1 tablet by mouth every morning   acetaminophen (TYLENOL) 500 mg tablet  Self Yes No   Sig: Take 500 mg by mouth every 6 (six) hours as needed   albuterol (PROVENTIL HFA,VENTOLIN HFA) 90 mcg/act inhaler  Self Yes No   Sig: Inhale 2 puffs every 6 (six) hours as needed   allopurinol (ZYLOPRIM) 100 mg tablet  Self Yes No   Sig: Take 100 mg by mouth daily   azelastine (ASTELIN) 0.1 % nasal spray   No No   Si spray into each nostril 2 (two) times a day Use in each nostril as directed   bisacodyl (DULCOLAX) 5 mg EC tablet  Self No No   Sig: Take 1 tablet (5 mg total) by mouth daily as needed for constipation   colchicine (COLCRYS) 0.6 mg tablet  Self Yes No   Sig: Take 0.6 mg by mouth 2 (two) times a day   cyanocobalamin (VITAMIN B-12) 1000 MCG tablet  Self Yes No   Sig: Take 1,000 mcg by mouth   dabigatran etexilate  (PRADAXA) 150 mg capsu  Self No No   Sig: Take 1 capsule (150 mg total) by mouth every 12 (twelve) hours   ezetimibe (ZETIA) 10 mg tablet  Self Yes No   Sig: Take 10 mg by mouth daily   furosemide (LASIX) 20 mg tablet  Self Yes No   Sig: Take 20 mg by mouth daily   gabapentin (Neurontin) 300 mg capsule  Self No No   Sig: Take 1 capsule (300 mg total) by mouth 3 (three) times a day   insulin degludec (TRESIBA) 100 units/mL injection pen  Self Yes No   Sig: Inject 80 Units under the skin   ipratropium (ATROVENT) 0.03 % nasal spray   No No   Si sprays into each nostril every 12 (twelve) hours   levothyroxine 112 mcg tablet  Self Yes No   lisinopril (ZESTRIL) 5 mg tablet  Self Yes No   loratadine (CLARITIN) 10 mg tablet  Self Yes No   Sig: Take 10 mg by mouth daily   metoprolol succinate (TOPROL-XL) 50 mg 24 hr tablet  Self No No   Sig: Take 1 tablet (50 mg total) by mouth daily Do not start before 2022.   oxyCODONE (ROXICODONE) 15 mg immediate release tablet   Yes No   Sig: Take 15 mg by mouth every 4 (four) hours as needed for moderate pain   pantoprazole (PROTONIX) 40 mg tablet  Self No No   Sig: Take 1 tablet (40 mg total) by mouth daily   polyethylene glycol (MIRALAX) 17 g packet  Self No No   Sig: Take 17 g by mouth daily   tamsulosin (FLOMAX) 0.4 mg  Self No No   Sig: Take 1 capsule (0.4 mg total) by mouth daily with dinner      Facility-Administered Medications: None     Patient's Medications   Discharge Prescriptions    No medications on file     No discharge procedures on file.  ED SEPSIS DOCUMENTATION   Time reflects when diagnosis was documented in both MDM as applicable and the Disposition within this note       Time User Action Codes Description Comment    2025  6:58 PM Luis Giordano [R10.9] Left flank pain     2025  6:59 PM Luis Giordano [R26.2] Ambulatory dysfunction                      [1]   Past Medical History:  Diagnosis Date    Diabetes (HCC)     Essential tremor      Hyperlipidemia     Hypertension     Obstructive sleep apnea     Paroxysmal atrial fibrillation (HCC)     Sick sinus syndrome (HCC)    [2]   Past Surgical History:  Procedure Laterality Date    CARDIAC ELECTROPHYSIOLOGY PROCEDURE N/A 2024    Procedure: Cardiac pacer generator change DC PM GEN CHANGE;  Surgeon: Wilton Esquivel DO;  Location: [facility];  Service: Cardiology    CARDIAC ELECTROPHYSIOLOGY PROCEDURE N/A 2024    Procedure: Cardiac pocket revision;  Surgeon: Wilton Esquivel DO;  Location: [facility];  Service: Cardiology    CT NEEDLE BIOPSY LUNG  2021    CT NEEDLE BIOPSY LUNG  2021    FL GUIDED NEEDLE PLAC BX/ASP/INJ  3/11/2024   [3] No family history on file.  [4]   Social History  Tobacco Use    Smoking status: Former     Current packs/day: 0.00     Average packs/day: 2.0 packs/day for 7.0 years (14.0 ttl pk-yrs)     Types: Cigarettes     Start date:      Quit date: 1965     Years since quittin.5    Smokeless tobacco: Never   Vaping Use    Vaping status: Never Used   Substance Use Topics    Alcohol use: Not Currently    Drug use: Never        Luis Giordano DO  25

## 2025-07-05 NOTE — ED ATTENDING ATTESTATION
7/5/2025  I, Paulo Wright DO, saw and evaluated the patient. I have discussed the patient with the resident/non-physician practitioner and agree with the resident's/non-physician practitioner's findings, Plan of Care, and MDM as documented in the resident's/non-physician practitioner's note, except where noted. All available labs and Radiology studies were reviewed.  I was present for key portions of any procedure(s) performed by the resident/non-physician practitioner and I was immediately available to provide assistance.       At this point I agree with the current assessment done in the Emergency Department.  I have conducted an independent evaluation of this patient a history and physical is as follows:    Patient is an 85-year-old male with a history of paroxysmal atrial fibrillation, type 2 diabetes, increased BMI, hypertension, dyslipidemia, chronic lower extremity edema, says that about a week ago he began having some mild left flank pain, worse when he twists or bends or moves or extends his left hip.  No fall, no trauma, he [facility] a [facility], ambulates with a walker.  He normally has some difficulty ambulating.  No dysuria, no hematuria, no nausea or vomiting, no previous kidney stones, no blood in his stool.  No bladder or bowel incontinence, no saddle anesthesia, no felicity leg weakness but says moving his left leg causes his left low back to hurt more so he has been doing that less.  Says that he saw his primary care physician on Monday, 5 days ago, had a urinalysis performed, he said he was told that was positive for urinary tract infection and he was started on antibiotics 2 days ago, he cannot remove the name of the antibiotic.  Earlier today because of the discomfort and pain he was unable to get up from a seated position so he called an ambulance, EMS indicates patient remained stable during transport.  Patient says that he does have chronic lower extremity edema and that seems  normal for him, he does have them wrapped regularly.    General:  Patient is well-appearing  Head:  Atraumatic  Eyes:  Conjunctiva pink  ENT:  Mucous membranes are moist  Neck:  Supple  Cardiac:  S1-S2, without murmurs  Lungs:  Clear to auscultation bilaterally  Abdomen:  Soft, nontender, normal bowel sounds, no CVA tenderness, no tympany, no rigidity, no guarding  Extremities: Patient has some bilateral pitting lower extremity edema, no calf asymmetry, no bony tenderness of the hip, femur, tibia, fibula, foot or ankle on either side.  No warmth or redness swelling at the hip knee or ankle but moving his left hip causes discomfort and pain in his low back.  Back: Patient has left lower lumbar paraspinal muscular hypertonicity and tenderness, no warmth or redness,No midline thoracic, lumbar, sacral tenderness, deformities, or step-offs.  Neurologic:  Awake, fluent speech, normal comprehension. AAOx3.  Patient has 5 out of 5 strength at the right hip, knee and ankle, 3 out of 5 strength at the left hip due to pain, 5 out of 5 at the left knee and ankle, normal sensation throughout both legs, no saddle anesthesia, I am unable to elicit reflexes at the knees and ankles due to edema and inability the patient to relax.  He can dorsiflex the great toes bilaterally  Skin:  Pink warm and dry  Psychiatric:  Alert, pleasant, cooperative      ED Course  ED Course as of 07/05/25 1728   Sat Jul 05, 2025   1639 On reassessment there was no change from the initial findings, protecting her airway, responding to verbal stimuli, hemodynamically stable   1707 ECG interpreted by me, sinus rhythm, rate of 70, first-degree AV block, no acute ischemic or acute infarctive changes, no acute change from 8/26/2024   1708 Chronic renal sufficiency, no acute change from 8/26/2024     CT abdomen pelvis wo contrast   Final Result      No acute findings in the abdomen or pelvis within the limits of unenhanced technique to include small bowel  obstruction, colitis, or acute appendicitis. No obstructive uropathy.      Other findings as detailed above.      Workstation performed: [id number]           Labs Reviewed   CBC AND DIFFERENTIAL - Abnormal       Result Value Ref Range Status    WBC 8.61  4.31 - 10.16 Thousand/uL Final    RBC 4.65  3.88 - 5.62 Million/uL Final    Hemoglobin 13.2  12.0 - 17.0 g/dL Final    Hematocrit 41.1  36.5 - 49.3 % Final    MCV 88  82 - 98 fL Final    MCH 28.4  26.8 - 34.3 pg Final    MCHC 32.1  31.4 - 37.4 g/dL Final    RDW 15.6 (*) 11.6 - 15.1 % Final    MPV 10.7  8.9 - 12.7 fL Final    Platelets 238  149 - 390 Thousands/uL Final    nRBC 0  /100 WBCs Final    Segmented % 68  43 - 75 % Final    Immature Grans % 1  0 - 2 % Final    Lymphocytes % 21  14 - 44 % Final    Monocytes % 7  4 - 12 % Final    Eosinophils Relative 3  0 - 6 % Final    Basophils Relative 0  0 - 1 % Final    Absolute Neutrophils 5.81  1.85 - 7.62 Thousands/µL Final    Absolute Immature Grans 0.08  0.00 - 0.20 Thousand/uL Final    Absolute Lymphocytes 1.83  0.60 - 4.47 Thousands/µL Final    Absolute Monocytes 0.63  0.17 - 1.22 Thousand/µL Final    Eosinophils Absolute 0.23  0.00 - 0.61 Thousand/µL Final    Basophils Absolute 0.03  0.00 - 0.10 Thousands/µL Final   COMPREHENSIVE METABOLIC PANEL - Abnormal    Sodium 137  135 - 147 mmol/L Final    Potassium 4.3  3.5 - 5.3 mmol/L Final    Chloride 104  96 - 108 mmol/L Final    CO2 24  21 - 32 mmol/L Final    ANION GAP 9  4 - 13 mmol/L Final    BUN 24  5 - 25 mg/dL Final    Creatinine 1.40 (*) 0.60 - 1.30 mg/dL Final    Comment: Standardized to IDMS reference method    Glucose 149 (*) 65 - 140 mg/dL Final    Comment: If the patient is fasting, the ADA then defines impaired fasting glucose as > 100 mg/dL and diabetes as > or equal to 123 mg/dL.    Calcium 8.7  8.4 - 10.2 mg/dL Final    AST 15  13 - 39 U/L Final    ALT 12  7 - 52 U/L Final    Comment: Specimen collection should occur prior to Sulfasalazine  administration due to the potential for falsely depressed results.     Alkaline Phosphatase 54  34 - 104 U/L Final    Total Protein 6.6  6.4 - 8.4 g/dL Final    Albumin 3.8  3.5 - 5.0 g/dL Final    Total Bilirubin 0.34  0.20 - 1.00 mg/dL Final    Comment: Use of this assay is not recommended for patients undergoing treatment with eltrombopag due to the potential for falsely elevated results.  N-acetyl-p-benzoquinone imine (metabolite of Acetaminophen) will generate erroneously low results in samples for patients that have taken an overdose of Acetaminophen.    eGFR 46  ml/min/1.73sq m Final    Narrative:     National Kidney Disease Foundation guidelines for Chronic Kidney Disease (CKD):     Stage 1 with normal or high GFR (GFR > 90 mL/min/1.73 square meters)    Stage 2 Mild CKD (GFR = 60-89 mL/min/1.73 square meters)    Stage 3A Moderate CKD (GFR = 45-59 mL/min/1.73 square meters)    Stage 3B Moderate CKD (GFR = 30-44 mL/min/1.73 square meters)    Stage 4 Severe CKD (GFR = 15-29 mL/min/1.73 square meters)    Stage 5 End Stage CKD (GFR <15 mL/min/1.73 square meters)  Note: GFR calculation is accurate only with a steady state creatinine   UA W REFLEX TO MICROSCOPIC WITH REFLEX TO CULTURE     On reassessment there was no change to the above findings.  Patient was unable to get up and ambulate independently with a walker.    At this point the cause of the patient's symptoms is unclear, I suspect this more likely be muscular pain from the low back.  He has no CVA tenderness, has no urinary symptoms, doubt this is pyelonephritis or a true urinary tract infection.  It would more likely be asymptomatic bacteriuria.  His CT shows no evidence of ureteral stone, no evidence of significant intra-abdominal pathology.  Labs showed no evidence of life-threatening metabolic abnormality.  He has no spinal tenderness, symptoms do not suggest acute cauda equina, spinal epidural abscess or discitis and I do not believe an emergent MRI  is indicated.  Given his inability to ambulate however I do believe he would benefit from hospitalization and further care.  Case discussed with admitting medicine physician.    The patient was evaluated for sepsis in the emergency department. After that assessment, at the time of admission, there was no evidence of severe sepsis or septic shock or indication that the patient should be included in the SEP-1 CMS quality measure.    MEDICAL DECISION MAKING CODING    COLLECTION AND INTERPRETATION OF DATA  I reviewed prior external notes, including 8/6/2024 ECG    I ordered each unique test  Tests reviewed personally by me:  ECG: See my ED course  Labs: See above  Imaging: I independently interpreted the CT as noted above.              Critical Care Time  Procedures

## 2025-07-06 PROBLEM — E83.42 HYPOMAGNESEMIA: Status: ACTIVE | Noted: 2025-07-06

## 2025-07-06 LAB
ANION GAP SERPL CALCULATED.3IONS-SCNC: 7 MMOL/L (ref 4–13)
ATRIAL RATE: 71 BPM
BACTERIA UR CULT: NORMAL
BUN SERPL-MCNC: 27 MG/DL (ref 5–25)
CALCIUM SERPL-MCNC: 8.3 MG/DL (ref 8.4–10.2)
CHLORIDE SERPL-SCNC: 105 MMOL/L (ref 96–108)
CO2 SERPL-SCNC: 26 MMOL/L (ref 21–32)
CREAT SERPL-MCNC: 1.49 MG/DL (ref 0.6–1.3)
ERYTHROCYTE [DISTWIDTH] IN BLOOD BY AUTOMATED COUNT: 15.5 % (ref 11.6–15.1)
EST. AVERAGE GLUCOSE BLD GHB EST-MCNC: 148 MG/DL
GFR SERPL CREATININE-BSD FRML MDRD: 42 ML/MIN/1.73SQ M
GLUCOSE SERPL-MCNC: 140 MG/DL (ref 65–140)
GLUCOSE SERPL-MCNC: 146 MG/DL (ref 65–140)
GLUCOSE SERPL-MCNC: 165 MG/DL (ref 65–140)
GLUCOSE SERPL-MCNC: 175 MG/DL (ref 65–140)
GLUCOSE SERPL-MCNC: 180 MG/DL (ref 65–140)
HBA1C MFR BLD: 6.8 %
HCT VFR BLD AUTO: 39.7 % (ref 36.5–49.3)
HGB BLD-MCNC: 12.6 G/DL (ref 12–17)
MAGNESIUM SERPL-MCNC: 1.8 MG/DL (ref 1.9–2.7)
MCH RBC QN AUTO: 28.6 PG (ref 26.8–34.3)
MCHC RBC AUTO-ENTMCNC: 31.7 G/DL (ref 31.4–37.4)
MCV RBC AUTO: 90 FL (ref 82–98)
PHOSPHATE SERPL-MCNC: 3.7 MG/DL (ref 2.3–4.1)
PLATELET # BLD AUTO: 218 THOUSANDS/UL (ref 149–390)
PMV BLD AUTO: 10.4 FL (ref 8.9–12.7)
POTASSIUM SERPL-SCNC: 4.3 MMOL/L (ref 3.5–5.3)
QRS AXIS: 78 DEGREES
QRSD INTERVAL: 86 MS
QT INTERVAL: 378 MS
QTC INTERVAL: 408 MS
RBC # BLD AUTO: 4.4 MILLION/UL (ref 3.88–5.62)
SODIUM SERPL-SCNC: 138 MMOL/L (ref 135–147)
T WAVE AXIS: 88 DEGREES
VENTRICULAR RATE: 70 BPM
WBC # BLD AUTO: 8.27 THOUSAND/UL (ref 4.31–10.16)

## 2025-07-06 PROCEDURE — 93010 ELECTROCARDIOGRAM REPORT: CPT | Performed by: INTERNAL MEDICINE

## 2025-07-06 PROCEDURE — 99233 SBSQ HOSP IP/OBS HIGH 50: CPT | Performed by: PHYSICIAN ASSISTANT

## 2025-07-06 PROCEDURE — 83735 ASSAY OF MAGNESIUM: CPT | Performed by: INTERNAL MEDICINE

## 2025-07-06 PROCEDURE — 82948 REAGENT STRIP/BLOOD GLUCOSE: CPT

## 2025-07-06 PROCEDURE — 85027 COMPLETE CBC AUTOMATED: CPT | Performed by: INTERNAL MEDICINE

## 2025-07-06 PROCEDURE — 84100 ASSAY OF PHOSPHORUS: CPT | Performed by: INTERNAL MEDICINE

## 2025-07-06 PROCEDURE — 80048 BASIC METABOLIC PNL TOTAL CA: CPT | Performed by: INTERNAL MEDICINE

## 2025-07-06 RX ORDER — INSULIN LISPRO 100 [IU]/ML
1-5 INJECTION, SOLUTION INTRAVENOUS; SUBCUTANEOUS
Status: DISCONTINUED | OUTPATIENT
Start: 2025-07-06 | End: 2025-07-07 | Stop reason: HOSPADM

## 2025-07-06 RX ORDER — MAGNESIUM SULFATE HEPTAHYDRATE 40 MG/ML
2 INJECTION, SOLUTION INTRAVENOUS ONCE
Status: COMPLETED | OUTPATIENT
Start: 2025-07-06 | End: 2025-07-06

## 2025-07-06 RX ADMIN — LISINOPRIL 5 MG: 5 TABLET ORAL at 08:42

## 2025-07-06 RX ADMIN — MAGNESIUM SULFATE HEPTAHYDRATE 2 G: 40 INJECTION, SOLUTION INTRAVENOUS at 11:06

## 2025-07-06 RX ADMIN — INSULIN LISPRO 1 UNITS: 100 INJECTION, SOLUTION INTRAVENOUS; SUBCUTANEOUS at 08:44

## 2025-07-06 RX ADMIN — OXYCODONE HYDROCHLORIDE 10 MG: 10 TABLET ORAL at 21:29

## 2025-07-06 RX ADMIN — ACETAMINOPHEN 975 MG: 325 TABLET ORAL at 04:50

## 2025-07-06 RX ADMIN — POLYETHYLENE GLYCOL 3350 17 G: 17 POWDER, FOR SOLUTION ORAL at 08:42

## 2025-07-06 RX ADMIN — CYANOCOBALAMIN TAB 500 MCG 1000 MCG: 500 TAB at 08:42

## 2025-07-06 RX ADMIN — EZETIMIBE 10 MG: 10 TABLET ORAL at 08:42

## 2025-07-06 RX ADMIN — METHOCARBAMOL 750 MG: 750 TABLET ORAL at 21:24

## 2025-07-06 RX ADMIN — FUROSEMIDE 20 MG: 20 TABLET ORAL at 08:42

## 2025-07-06 RX ADMIN — OXYCODONE HYDROCHLORIDE 10 MG: 10 TABLET ORAL at 04:51

## 2025-07-06 RX ADMIN — PANTOPRAZOLE SODIUM 40 MG: 40 TABLET, DELAYED RELEASE ORAL at 08:42

## 2025-07-06 RX ADMIN — ALLOPURINOL 100 MG: 100 TABLET ORAL at 08:42

## 2025-07-06 RX ADMIN — GABAPENTIN 300 MG: 300 CAPSULE ORAL at 21:24

## 2025-07-06 RX ADMIN — ACETAMINOPHEN 975 MG: 325 TABLET ORAL at 21:23

## 2025-07-06 RX ADMIN — METOPROLOL SUCCINATE 50 MG: 50 TABLET, EXTENDED RELEASE ORAL at 08:42

## 2025-07-06 RX ADMIN — INSULIN LISPRO 1 UNITS: 100 INJECTION, SOLUTION INTRAVENOUS; SUBCUTANEOUS at 21:24

## 2025-07-06 RX ADMIN — COLCHICINE 0.6 MG: 0.6 TABLET ORAL at 08:42

## 2025-07-06 RX ADMIN — DOCUSATE SODIUM 100 MG: 100 CAPSULE, LIQUID FILLED ORAL at 08:42

## 2025-07-06 RX ADMIN — INSULIN GLARGINE 40 UNITS: 100 INJECTION, SOLUTION SUBCUTANEOUS at 21:24

## 2025-07-06 RX ADMIN — COLCHICINE 0.6 MG: 0.6 TABLET ORAL at 17:16

## 2025-07-06 RX ADMIN — METHOCARBAMOL 750 MG: 750 TABLET ORAL at 13:46

## 2025-07-06 RX ADMIN — DABIGATRAN ETEXILATE MESYLATE 150 MG: 150 CAPSULE ORAL at 08:43

## 2025-07-06 RX ADMIN — LORATADINE 10 MG: 10 TABLET ORAL at 08:42

## 2025-07-06 RX ADMIN — ACETAMINOPHEN 975 MG: 325 TABLET ORAL at 13:46

## 2025-07-06 RX ADMIN — LEVOTHYROXINE SODIUM 112 MCG: 0.11 TABLET ORAL at 04:50

## 2025-07-06 RX ADMIN — DABIGATRAN ETEXILATE MESYLATE 150 MG: 150 CAPSULE ORAL at 21:23

## 2025-07-06 RX ADMIN — DOCUSATE SODIUM 100 MG: 100 CAPSULE, LIQUID FILLED ORAL at 17:16

## 2025-07-06 RX ADMIN — METHOCARBAMOL 750 MG: 750 TABLET ORAL at 04:50

## 2025-07-06 RX ADMIN — TAMSULOSIN HYDROCHLORIDE 0.4 MG: 0.4 CAPSULE ORAL at 17:16

## 2025-07-06 RX ADMIN — GABAPENTIN 300 MG: 300 CAPSULE ORAL at 08:42

## 2025-07-06 RX ADMIN — GABAPENTIN 300 MG: 300 CAPSULE ORAL at 17:16

## 2025-07-06 NOTE — ASSESSMENT & PLAN NOTE
Suspect muscular strain/sprain as there is no radiation or weakness  Due to his inability to walk due to severe pain will place in observation and establish a pain regimen and monitor for effectiveness  Start tylenol 650mg q8h, robaxin 750mg q8h and oxycodone 5/10mg PRN  Avoid NSAIDs due to CKD  Pt reports some improvement in pain today. He was able to ambulate a short distance.  Can also consider corticosteroids but this will likely worsening his diabetes control - hold off for now as he has had an improvement in pain.  If he fails to respond can consider an MRI of the lumbar spine but this will have to wait anyway due to the presence of a pacemaker  PT/OT evals pending.

## 2025-07-06 NOTE — PROGRESS NOTES
Progress Note - Hospitalist   Name: Lyndon Chairez 85 y.o. male I MRN: [id number]  Unit/Bed#: -01 I Date of Admission: 7/5/2025   Date of Service: 7/6/2025 I Hospital Day: 0    Assessment & Plan  Acute left-sided low back pain without sciatica  Suspect muscular strain/sprain as there is no radiation or weakness  Due to his inability to walk due to severe pain will place in observation and establish a pain regimen and monitor for effectiveness  Start tylenol 650mg q8h, robaxin 750mg q8h and oxycodone 5/10mg PRN  Avoid NSAIDs due to CKD  Pt reports some improvement in pain today. He was able to ambulate a short distance.  Can also consider corticosteroids but this will likely worsening his diabetes control - hold off for now as he has had an improvement in pain.  If he fails to respond can consider an MRI of the lumbar spine but this will have to wait anyway due to the presence of a pacemaker  PT/OT evals pending.  Paroxysmal atrial fibrillation (HCC)  Rate controlled with metoprolol  Has a pacemaker  Continue pradaxa for AC  Type 2 diabetes mellitus (HCC)  Lab Results   Component Value Date    HGBA1C 6.8 (H) 07/05/2025       Recent Labs     07/05/25  2201 07/06/25  0618 07/06/25  1100   POCGLU 127 180* 140       Blood Sugar Average: Last 72 hrs:  (P) 149diabetic diet while inpatient  He is unsure of his insulin dose and pharmacy records are not available  Continue lantus 40 units at HS  Add prandial insulin if needed  Sliding scale coverage for now.  Blood sugars acceptable.    Obesity, morbid (HCC)  Affects all aspects of his care and complicated mobility in the setting of severe low back pain  Outpatient follow up with PCP for weight loss counseling  Turn q2h  HTN (hypertension)  BP is acceptable  Continue metoprolol, lisinopril and furosemide  CKD (chronic kidney disease) stage 2, GFR 60-89 ml/min  Lab Results   Component Value Date    EGFR 42 07/06/2025    EGFR 46 07/05/2025    EGFR 49 08/26/2024     CREATININE 1.49 (H) 07/06/2025    CREATININE 1.40 (H) 07/05/2025    CREATININE 1.33 (H) 08/26/2024   Creatinine baseline is about 1.2-1.4  Currently at the top of his baseline  Pt received 30mg of toradol prior to arrival, further NSAIDs should be avoided  Encourage po fluids.  Monitor BMP.  Hypomagnesemia  Mag 1.8.  Will replace with mag sulfate 2g IV.  Continue to monitor.    VTE Pharmacologic Prophylaxis:   Moderate Risk (Score 3-4) - Pharmacological DVT Prophylaxis Ordered: dabigatran (Pradaxa).    Mobility:   Basic Mobility Inpatient Raw Score: 11  -HL Goal: 4: Move to chair/commode  JH-HLM Achieved: 3: Sit at edge of bed  JH-HLM Goal NOT achieved. Continue with multidisciplinary rounding and encourage appropriate mobility to improve upon -HL goals.    Patient Centered Rounds: I performed bedside rounds with nursing staff today.   Discussions with Specialists or Other Care Team Provider: nurse    Education and Discussions with Family / Patient: Patient declined call to .     Current Length of Stay: 0 day(s)  Current Patient Status: Inpatient   Certification Statement: The patient will continue to require additional inpatient hospital stay due to continued pain management, PT and OT evals  Discharge Plan: Anticipate discharge in 24-48 hrs to discharge location to be determined pending rehab evaluations.    Code Status: Level 1 - Full Code    Subjective     Pt seen in his room. He states that he back pain has improved from when he was admitted. He continues to report pain when twisting. He has not taken any as needed medication for breakthrough pain. Await PT and OT evals. Pt denies any other complaints.    Objective :  Temp:  [98.1 °F (36.7 °C)-98.2 °F (36.8 °C)] 98.2 °F (36.8 °C)  HR:  [60-73] 60  BP: (117-154)/(59-74) 137/74  Resp:  [18-28] 18  SpO2:  [92 %-97 %] 96 %  O2 Device: None (Room air)    Body mass index is 40.18 kg/m².     Input and Output Summary (last 24 hours):      Intake/Output Summary (Last 24 hours) at 7/6/2025 1455  Last data filed at 7/6/2025 1401  Gross per 24 hour   Intake 700 ml   Output 175 ml   Net 525 ml       Physical Exam  Vitals reviewed.   Constitutional:       Appearance: Normal appearance.   HENT:      Head: Normocephalic and atraumatic.      Nose: Nose normal.      Mouth/Throat:      Mouth: Mucous membranes are moist.      Pharynx: Oropharynx is clear.     Eyes:      Extraocular Movements: Extraocular movements intact.       Cardiovascular:      Rate and Rhythm: Normal rate and regular rhythm.   Pulmonary:      Effort: Pulmonary effort is normal.      Breath sounds: Normal breath sounds.   Abdominal:      Palpations: Abdomen is soft.     Musculoskeletal:      Cervical back: Normal range of motion.      Comments: + pain to palpation Lt lumbar region     Skin:     General: Skin is warm and dry.     Neurological:      Mental Status: He is alert.      Comments: Awake and alert.  Very Unga.   Psychiatric:         Mood and Affect: Mood normal.         Lines/Drains:              Lab Results: I have reviewed the following results:   Results from last 7 days   Lab Units 07/06/25  0519 07/05/25  1607   WBC Thousand/uL 8.27 8.61   HEMOGLOBIN g/dL 12.6 13.2   HEMATOCRIT % 39.7 41.1   PLATELETS Thousands/uL 218 238   SEGS PCT %  --  68   LYMPHO PCT %  --  21   MONO PCT %  --  7   EOS PCT %  --  3     Results from last 7 days   Lab Units 07/06/25  0519 07/05/25  1607   SODIUM mmol/L 138 137   POTASSIUM mmol/L 4.3 4.3   CHLORIDE mmol/L 105 104   CO2 mmol/L 26 24   BUN mg/dL 27* 24   CREATININE mg/dL 1.49* 1.40*   ANION GAP mmol/L 7 9   CALCIUM mg/dL 8.3* 8.7   ALBUMIN g/dL  --  3.8   TOTAL BILIRUBIN mg/dL  --  0.34   ALK PHOS U/L  --  54   ALT U/L  --  12   AST U/L  --  15   GLUCOSE RANDOM mg/dL 175* 149*         Results from last 7 days   Lab Units 07/06/25  1100 07/06/25  0618 07/05/25  2201   POC GLUCOSE mg/dl 140 180* 127     Results from last 7 days   Lab Units  07/05/25  1607   HEMOGLOBIN A1C % 6.8*           Recent Cultures (last 7 days):         Imaging Results Review: I reviewed radiology reports from this admission including: CT abdomen/pelvis.  Other Study Results Review: Other studies reviewed include: blood work, blood sugars    Last 24 Hours Medication List:     Current Facility-Administered Medications:     acetaminophen (TYLENOL) tablet 975 mg, Q8H EVANS    albuterol (PROVENTIL HFA,VENTOLIN HFA) inhaler 2 puff, Q6H PRN    allopurinol (ZYLOPRIM) tablet 100 mg, Daily    bisacodyl (DULCOLAX) EC tablet 5 mg, Daily PRN    colchicine (COLCRYS) tablet 0.6 mg, BID    cyanocobalamin (VITAMIN B-12) tablet 1,000 mcg, Daily    dabigatran etexilate (PRADAXA) capsule 150 mg, Q12H EVANS    docusate sodium (COLACE) capsule 100 mg, BID    ezetimibe (ZETIA) tablet 10 mg, Daily    furosemide (LASIX) tablet 20 mg, Daily    gabapentin (NEURONTIN) capsule 300 mg, TID    insulin glargine (LANTUS) subcutaneous injection 40 Units 0.4 mL, HS    insulin lispro (HumALOG/ADMELOG) 100 units/mL subcutaneous injection 1-5 Units, TID AC    insulin lispro (HumALOG/ADMELOG) 100 units/mL subcutaneous injection 1-5 Units, HS    levothyroxine tablet 112 mcg, Early Morning    lisinopril (ZESTRIL) tablet 5 mg, Daily    loratadine (CLARITIN) tablet 10 mg, Daily    methocarbamol (ROBAXIN) tablet 750 mg, Q8H EVANS    metoprolol succinate (TOPROL-XL) 24 hr tablet 50 mg, Daily    ondansetron (ZOFRAN) injection 4 mg, Q6H PRN    oxyCODONE (ROXICODONE) IR tablet 5 mg, Q4H PRN **OR** oxyCODONE (ROXICODONE) immediate release tablet 10 mg, Q4H PRN    pantoprazole (PROTONIX) EC tablet 40 mg, Daily    polyethylene glycol (MIRALAX) packet 17 g, Daily    tamsulosin (FLOMAX) capsule 0.4 mg, Daily With Dinner    Administrative Statements   Today, Patient Was Seen By: Dorothy Chen PA-C  I have spent a total time of 60 minutes in caring for this patient on the day of the visit/encounter including Diagnostic results,  Prognosis, Risks and benefits of tx options, Instructions for management, Patient and family education, Importance of tx compliance, Risk factor reductions, Impressions, Counseling / Coordination of care, Documenting in the medical record, Reviewing/placing orders in the medical record (including tests, medications, and/or procedures), Obtaining or reviewing history  , and Communicating with other healthcare professionals .    **Please Note: This note may have been constructed using a voice recognition system.**

## 2025-07-06 NOTE — ASSESSMENT & PLAN NOTE
Lab Results   Component Value Date    EGFR 42 07/06/2025    EGFR 46 07/05/2025    EGFR 49 08/26/2024    CREATININE 1.49 (H) 07/06/2025    CREATININE 1.40 (H) 07/05/2025    CREATININE 1.33 (H) 08/26/2024   Creatinine baseline is about 1.2-1.4  Currently at the top of his baseline  Pt received 30mg of toradol prior to arrival, further NSAIDs should be avoided  Encourage po fluids.  Monitor BMP.

## 2025-07-06 NOTE — UTILIZATION REVIEW
Initial Clinical Review    Pt initially admitted as Observation on 7/5. Changed to Inpatient on 7/6. Pt requiring continued stay d/t pain control.    Admission: Date/Time/Statement:   Admission Orders (From admission, onward)       Ordered        07/06/25 1453  INPATIENT ADMISSION  Once            07/05/25 1917  Place in Observation  Once                          Orders Placed This Encounter    INPATIENT ADMISSION     Standing Status:   Standing     Number of Occurrences:   1     Level of Care:   Med Surg [16]     Estimated length of stay:   More than 2 Midnights     Certification:   I certify that inpatient services are medically necessary for this patient for a duration of greater than two midnights. See H&P and MD Progress Notes for additional information about the patient's course of treatment.     ED Arrival Information       Expected   -    Arrival   7/5/2025 15:23    Acuity   Urgent              Means of arrival   Ambulance    Escorted by   [facility]    Service   Hospitalist    Admission type   Emergency              Arrival complaint   flank pain             Chief Complaint   Patient presents with    Flank Pain     Pt reports via EMS with left flank pain. Pt reports being tx for bladder infection with abx at this time.        Initial Presentation: 85 y.o. male who presented by EMS to [facility] ED.  Pertinent PMHx: T2DM, HLD, HTN, JAMES, afib. Presented w/ low back pain worsening over past 2 weeks. Was treated for UTI w/ PO ABX recently. Noted inability to get back up to standing after bending over to get the mail. EXAM: generalized tenderness, b/l LE edema. Admitted as Observation for evaluation and treatment of intractable back pain. PLAN: multimodal analgesics, PT/OT evals, continue PTA meds, SSI w/ BG checks ACHS, Lantus. Trend labs, replete electrolytes as needed..     Date: 07/06/25   Changed to Inpatient Status: Reports back pain improved, but persistent when  twisting. Lumbar tenderness w/ palpation. Crt 1.49 up from 1.4 on arrival. Plan: continue current meds, multimodal analgesics, PT/OT. SSI w/ BG checks ACHS. Lantus. Trend labs, replete electrolytes as needed. Encourage PO fluids. Mag 1.8 -- replete w/ IV mag 2g x1.     Date: 07/07/25  Day 3: Has surpassed a 2nd midnight with active treatments and services. He worked with PT/OT while inpatient and was advised to start outpatient therapy. He noted he was feeling improved and felt comfortable returning home on day of discharge. He was advised to go to his outpatient MRI tomorrow and follow up with pain management.        ED Treatment-Medication Administration from 07/05/2025 1523 to 07/05/2025 2131         Date/Time Order Dose Route Action     07/05/2025 1527 ketorolac (FOR EMS ONLY) (TORADOL) injection 30 mg 0 mg Does not apply Given to EMS     07/05/2025 1839 multi-electrolyte (Plasmalyte-A/Isolyte-S PH 7.4/Normosol-R) IV bolus 500 mL 500 mL Intravenous New Bag     07/05/2025 1957 colchicine (COLCRYS) tablet 0.6 mg 0.6 mg Oral Given     07/05/2025 2040 gabapentin (NEURONTIN) capsule 300 mg 300 mg Oral Given     07/05/2025 1957 oxyCODONE (ROXICODONE) IR tablet 5 mg 5 mg Oral Given     07/05/2025 1957 oxyCODONE (ROXICODONE) immediate release tablet 10 mg -- Oral See Alternative     07/05/2025 1957 methocarbamol (ROBAXIN) tablet 750 mg 750 mg Oral Given            Scheduled Medications:  acetaminophen, 975 mg, Oral, Q8H EVANS  allopurinol, 100 mg, Oral, Daily  colchicine, 0.6 mg, Oral, BID  cyanocobalamin, 1,000 mcg, Oral, Daily  dabigatran etexilate, 150 mg, Oral, Q12H EVANS  docusate sodium, 100 mg, Oral, BID  ezetimibe, 10 mg, Oral, Daily  furosemide, 20 mg, Oral, Daily  gabapentin, 300 mg, Oral, TID  insulin glargine, 40 Units, Subcutaneous, HS  insulin lispro, 1-5 Units, Subcutaneous, TID AC  insulin lispro, 1-5 Units, Subcutaneous, HS  levothyroxine, 112 mcg, Oral, Early Morning  lisinopril, 5 mg, Oral,  Daily  loratadine, 10 mg, Oral, Daily  methocarbamol, 750 mg, Oral, Q8H EVANS  metoprolol succinate, 50 mg, Oral, Daily  pantoprazole, 40 mg, Oral, Daily  polyethylene glycol, 17 g, Oral, Daily  tamsulosin, 0.4 mg, Oral, Daily With Dinner    Continuous IV Infusions: none    PRN Meds:  albuterol, 2 puff, Inhalation, Q6H PRN  bisacodyl, 5 mg, Oral, Daily PRN  ondansetron, 4 mg, Intravenous, Q6H PRN  oxyCODONE, 5 mg, Oral, Q4H PRN   Or  oxyCODONE, 10 mg, Oral, Q4H PRN; 7/6 x2, 7/7 x1    magnesium sulfate 2 g/50 mL IVPB (premix) 2 g  Dose: 2 g Freq: Once Route: IV  Start: 07/06/25 0830 End: 07/06/25 1306      ED Triage Vitals   Temperature Pulse Respirations Blood Pressure SpO2 Pain Score   07/05/25 1526 07/05/25 1526 07/05/25 1526 07/05/25 1545 07/05/25 1526 07/05/25 1526   98.1 °F (36.7 °C) 73 20 154/67 97 % 10 - Worst Possible Pain     Weight (last 2 days)       Date/Time Weight    07/05/25 2159 127 (280)            Vital Signs (last 3 days)       Date/Time Temp Pulse Resp BP MAP (mmHg) SpO2 O2 Device Patient Position - Orthostatic VS [place] Coma Scale Score Pain    07/07/25 0942 -- -- -- -- -- -- -- -- -- 6    07/07/25 0929 -- -- -- -- -- -- -- -- -- 4    07/07/25 0928 -- -- -- -- -- -- -- -- -- 4    07/07/25 07:13:16 97.7 °F (36.5 °C) 62 20 124/74 91 93 % None (Room air) Sitting -- --    07/07/25 0546 -- -- -- -- -- -- -- -- -- 4 07/07/25 0335 -- -- -- -- -- -- -- -- -- 7 07/06/25 2129 -- -- -- -- -- -- -- -- -- 7 07/06/25 21:04:37 98 °F (36.7 °C) 65 19 110/58 75 92 % None (Room air) Lying -- --    07/06/25 2000 -- -- -- -- -- -- None (Room air) -- 15 --    07/06/25 15:42:47 97.8 °F (36.6 °C) 70 -- 139/80 100 92 % -- -- -- --    07/06/25 1143 -- -- -- -- -- -- -- -- -- 4 07/06/25 07:37:55 98.2 °F (36.8 °C) 60 18 137/74 95 96 % -- Sitting -- --    07/06/25 0450 -- -- -- -- -- -- -- -- -- 8 07/05/25 2159 -- -- 18 -- -- -- None (Room air) -- -- 7 07/05/25 21:56:26 -- 64 -- -- -- 94 % -- -- -- --     07/05/25 21:52:29 98.1 °F (36.7 °C) -- 20 143/74 97 -- -- -- -- --    07/05/25 2000 -- 72 28 136/66 95 97 % None (Room air) Sitting -- --    07/05/25 1957 -- -- -- -- -- -- -- -- -- 9    07/05/25 1800 -- 61 18 144/65 93 92 % None (Room air) Sitting -- --    07/05/25 1700 -- 65 21 133/61 88 95 % None (Room air) Sitting -- --    07/05/25 1606 -- -- -- -- -- -- None (Room air) -- -- --    07/05/25 1600 -- 65 21 117/59 83 93 % None (Room air) Sitting -- --    07/05/25 1545 -- 72 26 154/67 96 95 % None (Room air) Sitting -- --    07/05/25 1526 98.1 °F (36.7 °C) 73 20 -- -- 97 % None (Room air) -- -- 10 - Worst Possible Pain              Pertinent Labs/Diagnostic Test Results:   Radiology:  CT abdomen pelvis wo contrast   Final Interpretation by Delgado Blankenship DO (07/05 1841)      No acute findings in the abdomen or pelvis within the limits of unenhanced technique to include small bowel obstruction, colitis, or acute appendicitis. No obstructive uropathy.      Other findings as detailed above.      Workstation performed: [id number]           Cardiology:  ECG 12 lead   Final Result by Danial Jin MD (07/06 0721)   Sinus rhythm with 1st degree A-V block   Nonspecific T wave abnormality   Abnormal ECG   When compared with ECG of 26-Aug-2024 09:23,   No significant change was found   Confirmed by Danial Jin ([id number]) on 7/6/2025 7:21:22 AM               Results from last 7 days   Lab Units 07/06/25  0519 07/05/25  1607   WBC Thousand/uL 8.27 8.61   HEMOGLOBIN g/dL 12.6 13.2   HEMATOCRIT % 39.7 41.1   PLATELETS Thousands/uL 218 238   TOTAL NEUT ABS Thousands/µL  --  5.81         Results from last 7 days   Lab Units 07/07/25  0528 07/06/25  0519 07/05/25  1607   SODIUM mmol/L 137 138 137   POTASSIUM mmol/L 4.5 4.3 4.3   CHLORIDE mmol/L 105 105 104   CO2 mmol/L 25 26 24   ANION GAP mmol/L 7 7 9   BUN mg/dL 24 27* 24   CREATININE mg/dL 1.26 1.49* 1.40*   EGFR ml/min/1.73sq m 52 42 46   CALCIUM mg/dL 8.5 8.3* 8.7    MAGNESIUM mg/dL  --  1.8*  --    PHOSPHORUS mg/dL  --  3.7  --      Results from last 7 days   Lab Units 07/05/25  1607   AST U/L 15   ALT U/L 12   ALK PHOS U/L 54   TOTAL PROTEIN g/dL 6.6   ALBUMIN g/dL 3.8   TOTAL BILIRUBIN mg/dL 0.34     Results from last 7 days   Lab Units 07/07/25  0527 07/06/25  2101 07/06/25  1608 07/06/25  1100 07/06/25  0618 07/05/25  2201   POC GLUCOSE mg/dl 140 165* 146* 140 180* 127     Results from last 7 days   Lab Units 07/07/25  0528 07/06/25  0519 07/05/25  1607   GLUCOSE RANDOM mg/dL 137 175* 149*         Results from last 7 days   Lab Units 07/05/25  1607   HEMOGLOBIN A1C % 6.8*   EAG mg/dl 148      Results from last 7 days   Lab Units 07/05/25 2001   CLARITY UA  Clear   COLOR UA  Yellow   SPEC GRAV UA  1.025   PH UA  5.5   GLUCOSE UA mg/dl Negative   KETONES UA mg/dl Trace*   BLOOD UA  Moderate*   PROTEIN UA mg/dl 100 (2+)*   NITRITE UA  Negative   BILIRUBIN UA  Negative   UROBILINOGEN UA (BE) mg/dl 2.0*   LEUKOCYTES UA  Trace*   WBC UA /hpf 10-20*   RBC UA /hpf 20-30*   BACTERIA UA /hpf Occasional   EPITHELIAL CELLS WET PREP /hpf Occasional      Results from last 7 days   Lab Units 07/05/25 2001   URINE CULTURE  <10,000 cfu/ml        Past Medical History[1]  Present on Admission:   Obesity, morbid (HCC)   Paroxysmal atrial fibrillation (HCC)   Type 2 diabetes mellitus (HCC)   HTN (hypertension)   CKD (chronic kidney disease) stage 2, GFR 60-89 ml/min      Admitting Diagnosis: Flank pain [R10.9]  Left flank pain [R10.9]  Ambulatory dysfunction [R26.2]  Age/Sex: 85 y.o. male    Network Utilization Review Department  ATTENTION: Please call with any questions or concerns to [phone] and carefully listen to the prompts so that you are directed to the right person. All voicemails are confidential.   For Discharge needs, contact Care Management DC Support Team at [phone] opt. 2  Send all requests for admission clinical reviews, approved or denied determinations and any  other requests to dedicated fax number below belonging to the campus where the patient is receiving treatment. List of dedicated fax numbers for the Facilities:  FACILITY NAME UR FAX NUMBER   ADMISSION DENIALS (Administrative/Medical Necessity) [phone]   DISCHARGE SUPPORT TEAM (NETWORK) [phone]   PARENT CHILD HEALTH (Maternity/NICU/Pediatrics) [phone]   [facility] [phone]   [facility] [phone]   [facility] [phone]   [facility] [phone]   [facility] [phone]   [facility] [phone]   [facility] [phone]   [facility] [phone]   [facility] [phone]   [facility] [phone]   [facility] [phone]   [facility] [phone]              [1]   Past Medical History:  Diagnosis Date    Diabetes (HCC)     Essential tremor     Hyperlipidemia     Hypertension     Obstructive sleep apnea     Paroxysmal atrial fibrillation (HCC)     Sick sinus syndrome (HCC)

## 2025-07-06 NOTE — PLAN OF CARE
Problem: PAIN - ADULT  Goal: Verbalizes/displays adequate comfort level or baseline comfort level  Description: Interventions:  - Encourage patient to monitor pain and request assistance  - Assess pain using appropriate pain scale  - Administer analgesics as ordered based on type and severity of pain and evaluate response  - Implement non-pharmacological measures as appropriate and evaluate response  - Consider cultural and social influences on pain and pain management  - Notify physician/advanced practitioner if interventions unsuccessful or patient reports new pain  - Educate patient/family on pain management process including their role and importance of  reporting pain   - Provide non-pharmacologic/complimentary pain relief interventions  7/6/2025 1151 by Sarita Garcia  Outcome: Progressing  7/6/2025 1150 by Sarita Garcia  Outcome: Progressing

## 2025-07-06 NOTE — ASSESSMENT & PLAN NOTE
Lab Results   Component Value Date    HGBA1C 6.8 (H) 07/05/2025       Recent Labs     07/05/25  2201 07/06/25  0618 07/06/25  1100   POCGLU 127 180* 140       Blood Sugar Average: Last 72 hrs:  (P) 149diabetic diet while inpatient  He is unsure of his insulin dose and pharmacy records are not available  Continue lantus 40 units at HS  Add prandial insulin if needed  Sliding scale coverage for now.  Blood sugars acceptable.

## 2025-07-07 VITALS
TEMPERATURE: 97.7 F | RESPIRATION RATE: 20 BRPM | WEIGHT: 280 LBS | OXYGEN SATURATION: 93 % | BODY MASS INDEX: 40.09 KG/M2 | DIASTOLIC BLOOD PRESSURE: 74 MMHG | HEART RATE: 62 BPM | SYSTOLIC BLOOD PRESSURE: 124 MMHG | HEIGHT: 70 IN

## 2025-07-07 LAB
ANION GAP SERPL CALCULATED.3IONS-SCNC: 7 MMOL/L (ref 4–13)
BUN SERPL-MCNC: 24 MG/DL (ref 5–25)
CALCIUM SERPL-MCNC: 8.5 MG/DL (ref 8.4–10.2)
CHLORIDE SERPL-SCNC: 105 MMOL/L (ref 96–108)
CO2 SERPL-SCNC: 25 MMOL/L (ref 21–32)
CREAT SERPL-MCNC: 1.26 MG/DL (ref 0.6–1.3)
GFR SERPL CREATININE-BSD FRML MDRD: 52 ML/MIN/1.73SQ M
GLUCOSE SERPL-MCNC: 137 MG/DL (ref 65–140)
GLUCOSE SERPL-MCNC: 140 MG/DL (ref 65–140)
GLUCOSE SERPL-MCNC: 148 MG/DL (ref 65–140)
POTASSIUM SERPL-SCNC: 4.5 MMOL/L (ref 3.5–5.3)
SODIUM SERPL-SCNC: 137 MMOL/L (ref 135–147)

## 2025-07-07 PROCEDURE — 97167 OT EVAL HIGH COMPLEX 60 MIN: CPT

## 2025-07-07 PROCEDURE — 99239 HOSP IP/OBS DSCHRG MGMT >30: CPT | Performed by: PHYSICIAN ASSISTANT

## 2025-07-07 PROCEDURE — 82948 REAGENT STRIP/BLOOD GLUCOSE: CPT

## 2025-07-07 PROCEDURE — 80048 BASIC METABOLIC PNL TOTAL CA: CPT | Performed by: PHYSICIAN ASSISTANT

## 2025-07-07 PROCEDURE — 97163 PT EVAL HIGH COMPLEX 45 MIN: CPT

## 2025-07-07 RX ORDER — OXYCODONE HYDROCHLORIDE 5 MG/1
5 TABLET ORAL 3 TIMES DAILY PRN
Qty: 30 TABLET | Refills: 0 | Status: SHIPPED | OUTPATIENT
Start: 2025-07-07 | End: 2025-07-17

## 2025-07-07 RX ORDER — METHOCARBAMOL 750 MG/1
750 TABLET, FILM COATED ORAL 3 TIMES DAILY PRN
Qty: 9 TABLET | Refills: 0 | Status: SHIPPED | OUTPATIENT
Start: 2025-07-07 | End: 2025-07-10

## 2025-07-07 RX ADMIN — EZETIMIBE 10 MG: 10 TABLET ORAL at 08:46

## 2025-07-07 RX ADMIN — METOPROLOL SUCCINATE 50 MG: 50 TABLET, EXTENDED RELEASE ORAL at 08:47

## 2025-07-07 RX ADMIN — METHOCARBAMOL 750 MG: 750 TABLET ORAL at 05:46

## 2025-07-07 RX ADMIN — GABAPENTIN 300 MG: 300 CAPSULE ORAL at 08:47

## 2025-07-07 RX ADMIN — PANTOPRAZOLE SODIUM 40 MG: 40 TABLET, DELAYED RELEASE ORAL at 08:46

## 2025-07-07 RX ADMIN — ALLOPURINOL 100 MG: 100 TABLET ORAL at 08:47

## 2025-07-07 RX ADMIN — OXYCODONE HYDROCHLORIDE 10 MG: 10 TABLET ORAL at 03:35

## 2025-07-07 RX ADMIN — LEVOTHYROXINE SODIUM 112 MCG: 0.11 TABLET ORAL at 05:45

## 2025-07-07 RX ADMIN — LISINOPRIL 5 MG: 5 TABLET ORAL at 08:47

## 2025-07-07 RX ADMIN — POLYETHYLENE GLYCOL 3350 17 G: 17 POWDER, FOR SOLUTION ORAL at 08:46

## 2025-07-07 RX ADMIN — DOCUSATE SODIUM 100 MG: 100 CAPSULE, LIQUID FILLED ORAL at 08:47

## 2025-07-07 RX ADMIN — DABIGATRAN ETEXILATE MESYLATE 150 MG: 150 CAPSULE ORAL at 08:47

## 2025-07-07 RX ADMIN — CYANOCOBALAMIN TAB 500 MCG 1000 MCG: 500 TAB at 08:47

## 2025-07-07 RX ADMIN — COLCHICINE 0.6 MG: 0.6 TABLET ORAL at 08:46

## 2025-07-07 RX ADMIN — LORATADINE 10 MG: 10 TABLET ORAL at 08:46

## 2025-07-07 RX ADMIN — FUROSEMIDE 20 MG: 20 TABLET ORAL at 08:46

## 2025-07-07 RX ADMIN — ACETAMINOPHEN 975 MG: 325 TABLET ORAL at 05:46

## 2025-07-07 NOTE — PHYSICAL THERAPY NOTE
Pt seen for *** complexity PT evaluation due to decrease in functional mobility status compared to baseline.  Pt with active PT eval/treat orders at this time.  Pt is a 85 y.o. M who presented to [facility] with LBP on 7/5/25.  Pt  has a past medical history of Diabetes ([facility]), Essential tremor, Hyperlipidemia, Hypertension, Obstructive sleep apnea, Paroxysmal atrial fibrillation ([facility]), and Sick sinus syndrome ([facility]).  Pt resides ***.  Pt presents with *** that contribute to limitations in ***.  Pt requires *** at this time.  Pt left *** with all needs in reach.  Pt will benefit from skilled therapy in order to address current impairments and functional limitations. PT to *** pt and recommending ***.  The patient's AM-PAC Basic Mobility Inpatient Short Form Raw Score is  . A Raw score of {greater than/less than or equal to:66110} 16 suggests the patient may benefit from discharge to {home/post-acute rehab services:07279}. Please also refer to the recommendation of the Physical Therapist for safe discharge planning.

## 2025-07-07 NOTE — OCCUPATIONAL THERAPY NOTE
Occupational Therapy Evaluation      Lyndon Chairez    7/7/2025    Principal Problem:    Acute left-sided low back pain without sciatica  Active Problems:    Paroxysmal atrial fibrillation (HCC)    Type 2 diabetes mellitus (HCC)    Obesity, morbid (HCC)    HTN (hypertension)    CKD (chronic kidney disease) stage 2, GFR 60-89 ml/min    Hypomagnesemia      Past Medical History[1]    Past Surgical History[2]       07/07/25 0929   OT Last Visit   OT Visit Date 07/07/25   Note Type   Note type Evaluation   Pain Assessment   Pain Assessment Tool 0-10   Pain Score 4   Pain Location/Orientation Orientation: Left;Orientation: Lower;Location: Back   Pain Onset/Description Onset: Ongoing   Patient's Stated Pain Goal No pain   Hospital Pain Intervention(s) Repositioned;Ambulation/increased activity;Emotional support;Relaxation technique   Restrictions/Precautions   Weight Bearing Precautions Per Order No   Other Precautions Chair Alarm;Bed Alarm;Multiple lines;Fall Risk;Pain   Home Living   Type of Home Apartment   Home Layout One level;Performs ADLs on one level  (senior high rise apt - 0STE)   Bathroom Shower/Tub Tub/shower unit   Bathroom Toilet Raised   Bathroom Equipment Grab bars in shower;Grab bars around toilet   Bathroom Accessibility Accessible   Home Equipment Walker;Cane   Additional Comments Pt reports use of RW for mobility PTA   Prior Function   Level of [place] Independent with ADLs;Independent with functional mobility;Needs assistance with IADLS   Lives With Alone  (Pt reports spouse passed away 3 weeks ago)   Receives Help From Family;Friend(s)   IADLs Independent with driving;Independent with meal prep;Independent with medication management   Falls in the last 6 months 0  (0 per Pt)   Vocational Retired   Lifestyle   Autonomy Pt reports being IND w/ all ADLS and most IADLS; has cleaning lady visit; (+) drives; ambulates w/ RW PTA   Reciprocal Relationships Pt lives alone. Pt reports spouse passed away  "3-4 weeks ago.   Service to Others Pt is retired   Intrinsic Gratification None stated   Subjective   Subjective \"I feel much better now\"   ADL   Where Assessed Chair   Eating Assistance 5  Supervision/Setup   Grooming Assistance 5  Supervision/Setup   UB Bathing Assistance 5  Supervision/Setup   LB Bathing Assistance 4  Minimal Assistance   UB Dressing Assistance 5  Supervision/Setup   LB Dressing Assistance 4  Minimal Assistance   Toileting Assistance  4  Minimal Assistance   Bed Mobility   Supine to Sit Unable to assess   Sit to Supine Unable to assess   Additional Comments Pt seated at EOB upon OT arrival. Pt seated OOB in chair w/ chair alarm activated and all needs in reach s/p OT session   Transfers   Sit to Stand 5  Supervision   Additional items Assist x 1;Increased time required;Verbal cues;Armrests   Stand to Sit 5  Supervision   Additional items Assist x 1;Increased time required;Verbal cues;Armrests   Additional Comments transfers w/ RW   Functional Mobility   Functional Mobility 5  Supervision   Additional Comments Pt demonstrated short distance household mobility in room w/ RW at S level   Additional items Rolling walker   Balance   Static Sitting Fair +   Dynamic Sitting Fair   Static Standing Fair -   Dynamic Standing Fair -   Ambulatory Fair -   Activity Tolerance   Activity Tolerance Patient limited by fatigue;Patient limited by pain   Medical Staff Made Aware PT Heydi;   Nurse Made Aware RN cleared Pt for OT session   RUE Assessment   RUE Assessment WFL   LUE Assessment   LUE Assessment WFL   Hand Function   Gross Motor Coordination Functional   Cognition   Overall Cognitive Status WFL   Arousal/Participation Alert;Cooperative   Attention Within functional limits   Orientation Level Oriented X4   Memory Within functional limits   Following Commands Follows all commands and directions without difficulty   Comments Pt is pleasant and cooperative   Assessment   Limitation Decreased ADL " status;Decreased endurance;Decreased high-level ADLs   Prognosis Fair   Assessment Pt is a 86 yo male seen for OT eval s/p adm to hospitals on 25 dx'd w/ acute L sided low back pain. Pt  has a past medical history of Diabetes (HCC), Essential tremor, Hyperlipidemia, Hypertension, Obstructive sleep apnea, Paroxysmal atrial fibrillation (HCC), and Sick sinus syndrome (HCC).. Pt with active OT orders and activity as tolerated orders. Pt lives alone in apt w/ 0STE. Pt was I w/ ADLS and mo9st IADLS, drove, & required use of DME PTA, including RW. Pt is currently demonstrating the following occupational deficits: S UB self care, Min A LB self care and toileting, S transfers and mobility w/ RW. These deficits that are impacting pt's baseline areas of occupation are a result of the following impairments: pain, endurance, activity tolerance, functional mobility, forward functional reach, balance, functional standing tolerance, unsupportive home environment, decreased I w/ ADLS/IADLS, and strength. The following Occupational Performance Areas to address include: grooming, bathing/shower, toilet hygiene, dressing, medication management, health maintenance, functional mobility, and clothing management.  Based on the aforementioned OT evaluation, functional performance deficits, and assessments, pt has been identified as a high complexity evaluation. Recommend  level III Minimal Resource Intensity  upon D/C. The patient's raw score on the AM-PAC Daily Activity Inpatient Short Form is 21. A raw score of greater than or equal to 19 suggests the patient may benefit from discharge to home. Please refer to the recommendation of the Occupational Therapist for safe discharge planning. Pt to continue to benefit from acute immediate OT services to address the following goals 2-3x/week to  w/in 10-14 days:   Goals   Patient Goals To feel better   LTG Time Frame 10-14   Long Term Goal #1 Refer to goals below   Plan   Treatment  Interventions ADL retraining;Functional transfer training;Endurance training;Patient/family training;Equipment evaluation/education;Compensatory technique education;Activityengagement;Energy conservation   Goal Expiration Date 07/21/25   OT Frequency 2-3x/wk   Discharge Recommendation   Rehab Resource Intensity Level, OT III (Minimum Resource Intensity)  (home OT)   AM-PAC Daily Activity Inpatient   Lower Body Dressing 3   Bathing 3   Toileting 3   Upper Body Dressing 4   Grooming 4   Eating 4   Daily Activity Raw Score 21   Daily Activity Standardized Score (Calc for Raw Score >=11) 44.27   AM-PAC Applied Cognition Inpatient   Following a Speech/Presentation 4   Understanding Ordinary Conversation 4   Taking Medications 4   Remembering Where Things Are Placed or Put Away 4   Remembering List of 4-5 Errands 4   Taking Care of Complicated Tasks 4   Applied Cognition Raw Score 24   Applied Cognition Standardized Score 62.21         GOALS    1) Pt will improve activity tolerance to G for 30 min txment sessions for increased engagement in functional tasks    2) Pt will complete UB/LB dressing/self care w/ mod I using adaptive device and DME as needed    3) Pt will complete bathing w/ Mod I w/ use of AE and DME as needed    4) Pt will complete toileting w/ mod I w/ G hygiene/thoroughness using DME as needed    5) Pt will improve functional transfers to Mod I on/off all surfaces using DME as needed w/ G balance/safety     6) Pt will improve functional mobility during ADL/IADL/leisure tasks to Mod I using DME as needed w/ G balance/safety     7) Pt will improve bed mobility to Mod I and sit EOB w/ G balance/trunk control as a prerequisite for further engagement in meaningful tasks    8) Pt will be attentive 100% of the time during ongoing cognitive assessment w/ G participation to assist w/ safe d/c planning/recommendations    9) Pt will demonstrate G carryover of pt/caregiver education and training as appropriate w/o cues  w/ good tolerance to increase safety during functional tasks    10) Pt will demonstrate 100% carryover of energy conservation techniques t/o functional I/ADL/leisure tasks w/o cues s/p skilled education to increase endurance during functional tasks     11) Pt will participate in simulated IADL management task to increase independence to Mod I w/ G safety and endurance        Zarina De Luna MS, OTR/L          [1]   Past Medical History:  Diagnosis Date    Diabetes (HCC)     Essential tremor     Hyperlipidemia     Hypertension     Obstructive sleep apnea     Paroxysmal atrial fibrillation (HCC)     Sick sinus syndrome (HCC)    [2]   Past Surgical History:  Procedure Laterality Date    CARDIAC ELECTROPHYSIOLOGY PROCEDURE N/A 8/26/2024    Procedure: Cardiac pacer generator change DC PM GEN CHANGE;  Surgeon: Wilton Esquivel DO;  Location: [facility];  Service: Cardiology    CARDIAC ELECTROPHYSIOLOGY PROCEDURE N/A 8/26/2024    Procedure: Cardiac pocket revision;  Surgeon: Wilton Esquivel DO;  Location: [facility];  Service: Cardiology    CT NEEDLE BIOPSY LUNG  2/4/2021    CT NEEDLE BIOPSY LUNG  2/4/2021    FL GUIDED NEEDLE PLAC BX/ASP/INJ  3/11/2024

## 2025-07-07 NOTE — DISCHARGE SUMMARY
Discharge Summary - Hospitalist   Name: Lyndon Chairez 85 y.o. male I MRN: [id number]  Unit/Bed#: -01 I Date of Admission: 7/5/2025   Date of Service: 7/7/2025 I Hospital Day: 1     Assessment & Plan  Acute left-sided low back pain without sciatica  Suspect muscular strain/sprain as there is no radiation or weakness  Due to his inability to walk due to severe pain he was admitted for a pain regimen and monitor for effectiveness  Start tylenol 650mg q8h, robaxin 750mg q8h and oxycodone 5mg PRN  Avoid NSAIDs due to CKD  Pt reports some improvement in pain today. He was able to ambulate a short distance.  Steroids held due to concern for steroid induced hyperglycemia, improved without need for steroids   Follow up PT/OT evals- recommending level III, discussed home therapy vs. Outpatient and patient requested referral for outpatient therapy   Continue outpatient pain management follow up, has outpatient MRI already scheduled for tomorrow   Paroxysmal atrial fibrillation (HCC)  Rate controlled with metoprolol  Has a pacemaker  Continue pradaxa for AC  Type 2 diabetes mellitus (HCC)  Lab Results   Component Value Date    HGBA1C 6.8 (H) 07/05/2025       Recent Labs     07/06/25  1100 07/06/25  1608 07/06/25  2101 07/07/25  0527   POCGLU 140 146* 165* 140       diabetic diet while inpatient  He is unsure of his insulin dose and pharmacy records are not available  Continue lantus 40 units at HS  Add prandial insulin if needed  Sliding scale coverage for now.  Blood sugars acceptable.    Obesity, morbid (HCC)  Affects all aspects of his care and complicated mobility in the setting of severe low back pain  Outpatient follow up with PCP for weight loss counseling  Turn q2h  HTN (hypertension)  BP stable during hospital stay   Continue metoprolol, lisinopril and furosemide  CKD (chronic kidney disease) stage 2, GFR 60-89 ml/min  Lab Results   Component Value Date    EGFR 52 07/07/2025    EGFR 42 07/06/2025    EGFR 46  07/05/2025    CREATININE 1.26 07/07/2025    CREATININE 1.49 (H) 07/06/2025    CREATININE 1.40 (H) 07/05/2025   Creatinine baseline is about 1.2-1.4  Currently at the top of his baseline  Pt received 30mg of toradol prior to arrival, further NSAIDs should be avoided  Encourage po fluids.  Creatinine stabilized today back to baseline  Hypomagnesemia  S/p repletion      Medical Problems       Resolved Problems  Date Reviewed: 1/23/2025   None       Discharging Physician / Practitioner: Stacey Soliz PA-C  PCP: Adrianna Garcia MD  Admission Date:   Admission Orders (From admission, onward)       Ordered        07/06/25 1453  INPATIENT ADMISSION  Once            07/05/25 1917  Place in Observation  Once                          Discharge Date: 07/07/25    Next Steps for Physician/AP Assuming Care:  Follow up with pain management for back pain   MRI as previously scheduled     Test Results Pending at Discharge (will require follow up):  none    Medication Changes for Discharge & Rationale:   Robaxin PRN x 3 days   See after visit summary for reconciled discharge medications provided to patient and/or family.     Consultations During Hospital Stay:  PT/OT     Procedures Performed:   CT abdomen pelvis wo contrast  Result Date: 7/5/2025  Impression: No acute findings in the abdomen or pelvis within the limits of unenhanced technique to include small bowel obstruction, colitis, or acute appendicitis. No obstructive uropathy.     Significant Findings / Test Results:   See above    Incidental Findings:    none     Hospital Course:   Lyndon Chairez is a 85 y.o. male patient who originally presented to the hospital on 7/5/2025 due to intractable back pain. He denied any radiation of his pain or weakness in the lower extremities. He noted symptomatic improvement with Tylenol, robaxin and oxycodone. He worked with PT/OT while inpatient and was advised to start outpatient therapy. He noted he was feeling improved and felt  "comfortable returning home on day of discharge. He was advised to go to his outpatient MRI tomorrow and follow up with pain management.        Please see above list of diagnoses and related plan for additional information.     Discharge Day Visit / Exam:   Subjective:  Patient seen and examined at bedside. Notes he is doing well today. Was able to walk with therapy and feels comfortable returning home.  Vitals: Blood Pressure: 124/74 (07/07/25 0713)  Pulse: 62 (07/07/25 0713)  Temperature: 97.7 °F (36.5 °C) (07/07/25 0713)  Temp Source: Oral (07/07/25 0713)  Respirations: 20 (07/07/25 0713)  Height: 5' 10\" (177.8 cm) (07/05/25 2159)  Weight - Scale: 127 kg (280 lb) (07/05/25 2159)  SpO2: 93 % (07/07/25 0713)  Physical Exam  Vitals reviewed.   Constitutional:       General: He is not in acute distress.  HENT:      Head: Normocephalic and atraumatic.     Eyes:      General: No scleral icterus.     Conjunctiva/sclera: Conjunctivae normal.       Cardiovascular:      Rate and Rhythm: Normal rate and regular rhythm.      Heart sounds: No murmur heard.  Pulmonary:      Effort: Pulmonary effort is normal. No respiratory distress.      Breath sounds: Normal breath sounds.   Abdominal:      General: Bowel sounds are normal. There is no distension.      Palpations: Abdomen is soft.      Tenderness: There is no abdominal tenderness.     Musculoskeletal:         General: No tenderness or deformity.      Cervical back: Neck supple.     Skin:     General: Skin is warm and dry.     Neurological:      Mental Status: He is alert and oriented to person, place, and time.     Psychiatric:         Mood and Affect: Mood normal.         Behavior: Behavior normal.            Discharge instructions/Information to patient and family:   See after visit summary for information provided to patient and family.      Provisions for Follow-Up Care:  See after visit summary for information related to follow-up care and any pertinent home health " orders.      Mobility at time of Discharge:   Basic Mobility Inpatient Raw Score: 11  JH-HLM Goal: 4: Move to chair/commode  JH-HLM Achieved: 3: Sit at edge of bed  HLM Goal NOT achieved. Continue to encourage mobility in post discharge setting.     Disposition:   Home    Planned Readmission: none    Administrative Statements   Discharge Statement:  I have spent a total time of 35 minutes in caring for this patient on the day of the visit/encounter. .    **Please Note: This note may have been constructed using a voice recognition system**

## 2025-07-07 NOTE — PHYSICAL THERAPY NOTE
Physical Therapy Evaluation     Patient's Name: Lyndon Chairez    Admitting Diagnosis  Flank pain [R10.9]  Left flank pain [R10.9]  Ambulatory dysfunction [R26.2]    Problem List  Problem List[1]    Past Medical History  Past Medical History[2]    Past Surgical History  Past Surgical History[3]         07/07/25 0928   PT Last Visit   PT Visit Date 07/07/25   Note Type   Note type Evaluation   Pain Assessment   Pain Assessment Tool 0-10   Pain Score 4   Pain Location/Orientation Orientation: Left;Orientation: Lower;Location: Back   Restrictions/Precautions   Weight Bearing Precautions Per Order No   Other Precautions Chair Alarm;Bed Alarm;Fall Risk;Pain;Hard of hearing   Home Living   Type of Home Apartment  ([facility])   Home Layout One level  (0STE)   Bathroom Shower/Tub Tub/shower unit   Bathroom Toilet Raised   Bathroom Equipment Grab bars in shower;Grab bars around toilet   Home Equipment Walker;Cane  (waas using RW PTA)   Prior Function   Level of [place] Independent with ADLs;Independent with functional mobility;Needs assistance with IADLS   Lives With Alone  (spouse passed away few weeks ago)   Receives Help From Family;Friend(s)   Falls in the last 6 months 0   Vocational Retired   General   Family/Caregiver Present No   Cognition   Overall Cognitive Status WFL   Arousal/Participation Alert   Attention Within functional limits   Memory Within functional limits   Following Commands Follows all commands and directions without difficulty   Subjective   Subjective Pleasant and agreeable to particiapte   RLE Assessment   RLE Assessment   (grossly 3+/5)   LLE Assessment   LLE Assessment   (grossly 3+/5)   Coordination   Sensation   (grossly intact)   Bed Mobility   Additional Comments Sitting EOB upon PT arrival.  Pt left upright in bedside chair with chair alarm intact and all needs in reach.   Transfers   Sit to Stand 5  Supervision   Additional items Increased time required   Stand to Sit 5   Supervision   Additional items Increased time required   Additional Comments with RW   Ambulation/Elevation   Gait pattern Excessively slow;Short stride;Foward flexed   Gait Assistance 5  Supervision   Assistive Device Rolling walker   Distance 15 ft x 2   Balance   Static Sitting Fair +   Dynamic Sitting Fair   Static Standing Fair -   Dynamic Standing Fair -   Ambulatory Fair -   Activity Tolerance   Activity Tolerance Patient limited by fatigue;Patient limited by pain   Medical Staff Made Aware OT Zarina   Nurse Made Aware RN cleared pt to be seen by PT   Assessment   Prognosis Good   Assessment Pt seen for high complexity PT evaluation due to decrease in functional mobility status compared to baseline.  Pt with active PT eval/treat orders at this time.  Pt is a 85 y.o. M who presented to [facility] with LBP on 7/5/25.  Pt  has a past medical history of Diabetes ([facility]), Essential tremor, Hyperlipidemia, Hypertension, Obstructive sleep apnea, Paroxysmal atrial fibrillation ([facility]), and Sick sinus syndrome ([facility]).  Pt resides alone in [address]  Pt requires supervision for all mobility at this time.  Pt left upright in bedside chair with chair alarm intact and with all needs in reach.  Pt denies any concerns regarding mobility upon DC. PT to DC pt as pt has no further acute skilled PT needs and recommending level III.  The patient's AM-PAC Basic Mobility Inpatient Short Form Raw Score is 17. A Raw score of greater than 16 suggests the patient may benefit from discharge to home. Please also refer to the recommendation of the Physical Therapist for safe discharge planning.   Barriers to Discharge None   Goals   Patient Goals to get better   Discharge Recommendation   Rehab Resource Intensity Level, PT III (Minimum Resource Intensity)   Equipment Recommended   (pt owns)   AM-PAC Basic Mobility Inpatient   Turning in Flat Bed Without Bedrails 3   Lying on Back to Sitting on Edge of Flat Bed Without  Bedrails 3   Moving Bed to Chair 3   Standing Up From Chair Using Arms 3   Walk in Room 3   Climb 3-5 Stairs With Railing 2   Basic Mobility Inpatient Raw Score 17   Basic Mobility Standardized Score 39.67   [facility] Highest Level Of Mobility   -HLM Goal 5: Stand one or more mins   -HLM Achieved 7: Walk 25 feet or more   Modified Adams Scale   Modified Adams Scale 3       Heydi Burrell, PT, DPT       [1]   Patient Active Problem List  Diagnosis    Obesity, morbid (HCC)    Atypical chest pain    Dysphagia    Sick sinus syndrome (HCC)    Paroxysmal atrial fibrillation (HCC)    BPH (benign prostatic hyperplasia)    Type 2 diabetes mellitus (HCC)    COVID-19    Lung nodules    Excessive daytime sleepiness    Sacroiliitis (HCC)    Greater trochanteric bursitis of right hip    Aortic stenosis    HTN (hypertension)    NSVT (nonsustained ventricular tachycardia) (HCC)    Dyslipidemia    Coronary artery calcification    JAMES (obstructive sleep apnea)    Thoracic aortic aneurysm without rupture (HCC)    CKD (chronic kidney disease) stage 2, GFR 60-89 ml/min    Failed total knee arthroplasty  (HCC)    Acute left-sided low back pain without sciatica    Hypomagnesemia   [2]   Past Medical History:  Diagnosis Date    Diabetes (HCC)     Essential tremor     Hyperlipidemia     Hypertension     Obstructive sleep apnea     Paroxysmal atrial fibrillation (HCC)     Sick sinus syndrome (HCC)    [3]   Past Surgical History:  Procedure Laterality Date    CARDIAC ELECTROPHYSIOLOGY PROCEDURE N/A 8/26/2024    Procedure: Cardiac pacer generator change DC PM GEN CHANGE;  Surgeon: Wilton Esquivel DO;  Location: [facility];  Service: Cardiology    CARDIAC ELECTROPHYSIOLOGY PROCEDURE N/A 8/26/2024    Procedure: Cardiac pocket revision;  Surgeon: Wilton Esquivel DO;  Location: [facility];  Service: Cardiology    CT NEEDLE BIOPSY LUNG  2/4/2021    CT NEEDLE BIOPSY LUNG  2/4/2021    FL GUIDED NEEDLE PLAC BX/ASP/INJ  3/11/2024

## 2025-07-07 NOTE — ASSESSMENT & PLAN NOTE
Lab Results   Component Value Date    HGBA1C 6.8 (H) 07/05/2025       Recent Labs     07/06/25  1100 07/06/25  1608 07/06/25  2101 07/07/25  0527   POCGLU 140 146* 165* 140       diabetic diet while inpatient  He is unsure of his insulin dose and pharmacy records are not available  Continue lantus 40 units at HS  Add prandial insulin if needed  Sliding scale coverage for now.  Blood sugars acceptable.

## 2025-07-07 NOTE — PLAN OF CARE
Problem: PAIN - ADULT  Goal: Verbalizes/displays adequate comfort level or baseline comfort level  Description: Interventions:  - Encourage patient to monitor pain and request assistance  - Assess pain using appropriate pain scale  - Administer analgesics as ordered based on type and severity of pain and evaluate response  - Implement non-pharmacological measures as appropriate and evaluate response  - Consider cultural and social influences on pain and pain management  - Notify physician/advanced practitioner if interventions unsuccessful or patient reports new pain  - Educate patient/family on pain management process including their role and importance of  reporting pain   - Provide non-pharmacologic/complimentary pain relief interventions  Outcome: Progressing     Problem: INFECTION - ADULT  Goal: Absence or prevention of progression during hospitalization  Description: INTERVENTIONS:  - Assess and monitor for signs and symptoms of infection  - Monitor lab/diagnostic results  - Monitor all insertion sites, i.e. indwelling lines, tubes, and drains  - Monitor endotracheal if appropriate and nasal secretions for changes in amount and color  - [place] appropriate cooling/warming therapies per order  - Administer medications as ordered  - Instruct and encourage patient and family to use good hand hygiene technique  - Identify and instruct in appropriate isolation precautions for identified infection/condition  Outcome: Progressing     Problem: SAFETY ADULT  Goal: Patient will remain free of falls  Description: INTERVENTIONS:  - Educate patient/family on patient safety including physical limitations  - Instruct patient to call for assistance with activity   - Consider consulting OT/PT to assist with strengthening/mobility based on AM PAC & JH-HLM score  - Consult OT/PT to assist with strengthening/mobility   - Keep Call bell within reach  - Keep bed low and locked with side rails adjusted as appropriate  - Keep  care items and personal belongings within reach  - Initiate and maintain comfort rounds  - Make Fall Risk Sign visible to staff  - Offer Toileting every 2 Hours, in advance of need  - Initiate/Maintain bed alarm  Problem: GENITOURINARY - ADULT  Goal: Maintains or returns to baseline urinary function  Description: INTERVENTIONS:  - Assess urinary function  - Encourage oral fluids to ensure adequate hydration if ordered  - Administer IV fluids as ordered to ensure adequate hydration  - Administer ordered medications as needed  - Offer frequent toileting  - Follow urinary retention protocol if ordered  Outcome: Progressing     Problem: MUSCULOSKELETAL - ADULT  Goal: Maintain or return mobility to safest level of function  Description: INTERVENTIONS:  - Assess patient's ability to carry out ADLs; assess patient's baseline for ADL function and identify physical deficits which impact ability to perform ADLs (bathing, care of mouth/teeth, toileting, grooming, dressing, etc.)  - Assess/evaluate cause of self-care deficits   - Assess range of motion  - Assess patient's mobility  - Assess patient's need for assistive devices and provide as appropriate  - Encourage maximum independence but intervene and supervise when necessary  - Involve family in performance of ADLs  - Assess for home care needs following discharge   - Consider OT consult to assist with ADL evaluation and planning for discharge  - Provide patient education as appropriate  Outcome: Progressing  Goal: Maintain proper alignment of affected body part  Description: INTERVENTIONS:  - Support, maintain and protect limb and body alignment  - Provide patient/ family with appropriate education  Outcome: Progressing     - Apply yellow socks and bracelet for high fall risk patients  - Consider moving patient to room near nurses station  Outcome: Progressing

## 2025-07-07 NOTE — CASE MANAGEMENT
Case Management Discharge Planning Note    Patient name Lyndon Chairez  Location /-01 MRN [id number]  : 1940 Date 2025       Current Admission Date: 2025  Current Admission Diagnosis:Acute left-sided low back pain without sciatica   Patient Active Problem List    Diagnosis Date Noted    Hypomagnesemia 2025    Acute left-sided low back pain without sciatica 2025    Failed total knee arthroplasty  (HCC) 2024    Aortic stenosis     HTN (hypertension)     NSVT (nonsustained ventricular tachycardia) (HCC)     Dyslipidemia     Coronary artery calcification     JAMES (obstructive sleep apnea)     Thoracic aortic aneurysm without rupture (HCC)     CKD (chronic kidney disease) stage 2, GFR 60-89 ml/min     Greater trochanteric bursitis of right hip 2023    Sacroiliitis (HCC)     Obesity, morbid (HCC) 2023    Lung nodules 2023    Excessive daytime sleepiness 2023    Atypical chest pain 10/27/2022    Dysphagia 10/27/2022    Sick sinus syndrome (HCC) 10/27/2022    Paroxysmal atrial fibrillation (HCC) 10/27/2022    BPH (benign prostatic hyperplasia) 10/27/2022    Type 2 diabetes mellitus ([facility]) 10/27/2022    COVID-19 10/27/2022      LOS (days): 1  Geometric Mean LOS (GMLOS) (days):   Days to GMLOS:     OBJECTIVE:  Risk of Unplanned Readmission Score: 13.44         Current admission status: Inpatient   Preferred Pharmacy:   [place], [place] -  [address]  [facility] [address]  Phone: [phone] Fax: [phone]    [facility] [place], [place] - [facility] [address]  [address]  [place] [address]  Phone: [phone] Fax: [phone]    Primary Care Provider: Adrianna Garcia MD    Primary Insurance: [facility]  Secondary Insurance:     DISCHARGE DETAILS:  Additional Comments: CM informed by provider pt is medically stable for discharge. Pt wants outpatient PT on discharge. CM  spoke with pt's SW at [facility] ([place] [phone]) and informed pt discharging home today with OPPT. [facility] to setup pt's OPPT. No other CM needs identified at this time.

## 2025-07-07 NOTE — WOUND OSTOMY CARE
Consult Note - Wound   Lyndon Chairez 85 y.o. male MRN: [id number]  Unit/Bed#: -01 Encounter: [id number]        History and Present Illness:  Patient is an 84 yo male that was admitted to [facility] for treatment of acute left sided low back pain without sciatica.  Patient is a min assist for turning and repositioning. Patient is continent of bowel and bladder. On assessment, patient is seen sitting OOB in recliner.     Wound Care was consulted for bilateral leg wounds.      Assessment Findings:   B/L heels are intact and blanchable - no wounds or skin loss noted to areas. Recommend preventative Hydraguard Cream and proper offloading/ repositioning.  Patient declined assessment of sacro-buttocks.     Right pretibial leg is intact with no wounds noted.   Right Anterior and Lateral Pretibial Leg Wounds: intact reabsorbing blister on anterior aspect of leg and lateral aspect of pretibial leg with partial thickness skin loss. Wound beds with pink in color tissue. Staci-wounds are fragile. Scant serosanguineous drainage noted.     No induration, fluctuance, odor, warmth/temperature differences, redness, or purulence noted to the above noted wounds and skin areas assessed. New dressings applied per orders listed below. Patient tolerated well- no s/s of non-verbal pain or discomfort observed during the encounter. Bedside nurse aware of plan of care. See flow sheets for more detailed assessment findings.      Orders listed below and wound care will continue to follow, call or Secure Chat Message with questions.     Skin Care Plan:  1-Bilateral Lower Legs: cleanse with soap and water. Apply adaptic to wound beds, cover with ABDs. Wrap both legs with miguel. Wrap legs from toes to knees with ace wraps. Change every other day or as needed.   2-Turn/reposition every 2 hours or when medically stable for pressure re-distribution on skin.  3-Elevate heels to offload pressure.  4-Moisturize skin daily with skin nourishing  cream  5-Ehob cushion in chair when out of bed.  6-Preventative Hydraguard to bilateral sacro-buttocks and heels twice a day and as needed.       Wounds:  Wound 07/05/25 Pretibial Right (Active)   Wound Image    07/07/25 1152   Wound Description Intact 07/07/25 1300   Wound Length (cm) 0 cm 07/07/25 1300   Wound Width (cm) 0 cm 07/07/25 1300   Wound Depth (cm) 0 cm 07/07/25 1300   Wound Surface Area (cm^2) 0 cm^2 07/07/25 1300   Wound Volume (cm^3) 0 cm^3 07/07/25 1300   Calculated Wound Volume (cm^3) 0 cm^3 07/07/25 1300   Staci-wound Assessment Intact 07/07/25 1300   Treatments Cleansed;Site care 07/07/25 1300   Dressing Dry dressing 07/07/25 1300   Dressing Changed Changed 07/07/25 1300   Patient Tolerance Tolerated well 07/07/25 1300   Dressing Status Intact 07/07/25 1300       Wound 07/05/25 Pretibial Left (Active)   Wound Image    07/07/25 1154   Wound Description Beefy red;Pink 07/07/25 1300   Non-staged Wound Description Partial thickness 07/07/25 1300   Wound Length (cm) 14 cm 07/07/25 1300   Wound Width (cm) 8 cm 07/07/25 1300   Wound Depth (cm) 0.1 cm 07/07/25 1300   Wound Surface Area (cm^2) 87.96 cm^2 07/07/25 1300   Wound Volume (cm^3) 5.864 cm^3 07/07/25 1300   Calculated Wound Volume (cm^3) 11.2 cm^3 07/07/25 1300   Drainage Amount Scant 07/07/25 1300   Drainage Description Serosanguineous 07/07/25 1300   Staci-wound Assessment Fragile 07/07/25 1300   Treatments Cleansed;Site care;Irrigation with NSS 07/07/25 1300   Dressing Non adherent;ABD;Dry dressing 07/07/25 1300   Dressing Changed Changed 07/07/25 1300   Patient Tolerance Tolerated well 07/07/25 1300   Dressing Status Clean;Intact;Dry 07/07/25 1300               Ena Guerrero RN, BSN, CWOCN

## 2025-07-07 NOTE — UTILIZATION REVIEW
NOTIFICATION OF INPATIENT ADMISSION      AUTHORIZATION REQUEST   SERVICING FACILITY:   [facility]  Address: [address] [place] [address]  Tax ID: [id number]  NPI: [id number] ATTENDING PROVIDER:  Attending Name and NPI#: Felicia Sandoval Md [[id number]]  Address: [address] [place] [address]  Phone: [phone]   ADMISSION INFORMATION:  Place of Service: Inpatient [facility]  Place of Service Code: 21  Inpatient Admission Date/Time: 7/6/25  2:53 PM  Discharge Date/Time: No discharge date for patient encounter.  Admitting Diagnosis Code/Description:  Flank pain [R10.9]  Left flank pain [R10.9]  Ambulatory dysfunction [R26.2]     UTILIZATION REVIEW CONTACT:  Sybil Fleming Utilization   Network Utilization Review Department  Phone: [phone]  Fax: [phone]  Email: [email or web address]  Contact for approvals/pending authorizations, clinical reviews, and discharge.     PHYSICIAN ADVISORY SERVICES:  Medical Necessity Denial & Sipv-kf-Okph Review  Phone: [phone]  Fax: [phone]  Email: [email or web address]     DISCHARGE SUPPORT TEAM:  For Patients Discharge Needs & Updates  Phone: [phone] opt. 2 Fax: [phone]  Email: [email or web address]

## 2025-07-07 NOTE — PLAN OF CARE
Problem: OCCUPATIONAL THERAPY ADULT  Goal: Performs self-care activities at highest level of function for planned discharge setting.  See evaluation for individualized goals.  Description: Treatment Interventions: ADL retraining, Functional transfer training, Endurance training, Patient/family training, Equipment evaluation/education, Compensatory technique education, Activityengagement, Energy conservation          See flowsheet documentation for full assessment, interventions and recommendations.   Note: Limitation: Decreased ADL status, Decreased endurance, Decreased high-level ADLs  Prognosis: Fair  Assessment: Pt is a 86 yo male seen for OT eval s/p adm to hospitals on 7/5/25 dx'd w/ acute L sided low back pain. Pt  has a past medical history of Diabetes ([facility]), Essential tremor, Hyperlipidemia, Hypertension, Obstructive sleep apnea, Paroxysmal atrial fibrillation ([facility]), and Sick sinus syndrome ([facility]).. Pt with active OT orders and activity as tolerated orders. Pt lives alone in apt w/ 0STE. Pt was I w/ ADLS and mo9st IADLS, drove, & required use of DME PTA, including RW. Pt is currently demonstrating the following occupational deficits: S UB self care, Min A LB self care and toileting, S transfers and mobility w/ RW. These deficits that are impacting pt's baseline areas of occupation are a result of the following impairments: pain, endurance, activity tolerance, functional mobility, forward functional reach, balance, functional standing tolerance, unsupportive home environment, decreased I w/ ADLS/IADLS, and strength. The following Occupational Performance Areas to address include: grooming, bathing/shower, toilet hygiene, dressing, medication management, health maintenance, functional mobility, and clothing management.  Based on the aforementioned OT evaluation, functional performance deficits, and assessments, pt has been identified as a high complexity evaluation. Recommend  level III Minimal Resource Intensity   upon D/C. The patient's raw score on the AM-PAC Daily Activity Inpatient Short Form is 21. A raw score of greater than or equal to 19 suggests the patient may benefit from discharge to home. Please refer to the recommendation of the Occupational Therapist for safe discharge planning. Pt to continue to benefit from acute immediate OT services to address the following goals 2-3x/week to  w/in 10-14 days:     Rehab Resource Intensity Level, OT: III (Minimum Resource Intensity) (home OT)

## 2025-07-07 NOTE — ASSESSMENT & PLAN NOTE
Lab Results   Component Value Date    EGFR 52 07/07/2025    EGFR 42 07/06/2025    EGFR 46 07/05/2025    CREATININE 1.26 07/07/2025    CREATININE 1.49 (H) 07/06/2025    CREATININE 1.40 (H) 07/05/2025   Creatinine baseline is about 1.2-1.4  Currently at the top of his baseline  Pt received 30mg of toradol prior to arrival, further NSAIDs should be avoided  Encourage po fluids.  Creatinine stabilized today back to baseline

## 2025-07-07 NOTE — ASSESSMENT & PLAN NOTE
Suspect muscular strain/sprain as there is no radiation or weakness  Due to his inability to walk due to severe pain he was admitted for a pain regimen and monitor for effectiveness  Start tylenol 650mg q8h, robaxin 750mg q8h and oxycodone 5mg PRN  Avoid NSAIDs due to CKD  Pt reports some improvement in pain today. He was able to ambulate a short distance.  Steroids held due to concern for steroid induced hyperglycemia, improved without need for steroids   Follow up PT/OT evals- recommending level III, discussed home therapy vs. Outpatient and patient requested referral for outpatient therapy   Continue outpatient pain management follow up, has outpatient MRI already scheduled for tomorrow

## 2025-07-08 ENCOUNTER — HOSPITAL ENCOUNTER (OUTPATIENT)
Dept: RADIOLOGY | Facility: HOSPITAL | Age: 85
Discharge: HOME | End: 2025-07-08
Payer: MEDICARE

## 2025-07-08 DIAGNOSIS — M54.16 LUMBAR RADICULOPATHY: ICD-10-CM

## 2025-07-08 PROCEDURE — 72148 MRI LUMBAR SPINE W/O DYE: CPT

## 2025-07-08 PROCEDURE — 76014 MR SFTY IMPLT&/FB ASMT STF 1: CPT

## 2025-07-08 PROCEDURE — 76018 MR SAFETY IMPLANT ELEC PREPJ: CPT

## 2025-07-08 NOTE — UTILIZATION REVIEW
NOTIFICATION OF ADMISSION DISCHARGE   This is a Notification of Discharge from [facility]. Please be advised that this patient has been discharge from our facility. Below you will find the admission and discharge date and time including the patient’s disposition.   UTILIZATION REVIEW CONTACT:  Utilization Review Assistants  Network Utilization Review Department  Phone: [phone] x carefully listen to the prompts. All voicemails are confidential.  Email: [email or web address]     ADMISSION INFORMATION  PRESENTATION DATE: 7/5/2025  3:24 PM  OBERVATION ADMISSION DATE: 07/05/2025 1917  INPATIENT ADMISSION DATE: 7/6/25  2:53 PM   DISCHARGE DATE: 7/7/2025  1:02 PM   DISPOSITION:Home/Self Care    Network Utilization Review Department  ATTENTION: Please call with any questions or concerns to [phone] and carefully listen to the prompts so that you are directed to the right person. All voicemails are confidential.   For Discharge needs, contact Care Management DC Support Team at [phone] opt. 2  Send all requests for admission clinical reviews, approved or denied determinations and any other requests to dedicated fax number below belonging to the campus where the patient is receiving treatment. List of dedicated fax numbers for the Facilities:  FACILITY NAME UR FAX NUMBER   ADMISSION DENIALS (Administrative/Medical Necessity) [phone]   DISCHARGE SUPPORT TEAM ([facility]) [phone]   PARENT CHILD HEALTH (Maternity/NICU/Pediatrics) [phone]   [facility] [phone]   [facility] [phone]   Counts include 234 beds at the [facility] [phone]   [facility] [phone]   [facility] [phone]   [facility] [phone]   [facility] [phone]   [facility] [phone]   [facility]  [facility] [phone]   [facility] [phone]   [facility] [phone]   [facility] [phone]

## 2025-07-08 NOTE — NURSING NOTE
Pt ambulates with walker. Pt has hearing aids/stored in container.   [employer] device interrogation for MRI done by Suresh [employer] rep. Device set to MRI safe mode @85 bpm.    MRI lumbar spine without contrast complete. Pt tolerated well.    VSS throughout scan. Pt alert and oriented on room air. No complaints or visible signs of distress.  Device reprogrammed to prior settings per Cardiology by Suresh, [employer] rep.

## (undated) DEVICE — PLASMABLADE PS200-040 4.0: Brand: PLASMABLADE™